# Patient Record
Sex: FEMALE | Race: ASIAN | NOT HISPANIC OR LATINO
[De-identification: names, ages, dates, MRNs, and addresses within clinical notes are randomized per-mention and may not be internally consistent; named-entity substitution may affect disease eponyms.]

---

## 2017-01-31 ENCOUNTER — RX RENEWAL (OUTPATIENT)
Age: 82
End: 2017-01-31

## 2017-02-03 ENCOUNTER — APPOINTMENT (OUTPATIENT)
Dept: FAMILY MEDICINE | Facility: CLINIC | Age: 82
End: 2017-02-03

## 2017-02-03 VITALS — SYSTOLIC BLOOD PRESSURE: 148 MMHG | RESPIRATION RATE: 14 BRPM | HEART RATE: 12 BPM | DIASTOLIC BLOOD PRESSURE: 60 MMHG

## 2017-03-17 ENCOUNTER — MEDICATION RENEWAL (OUTPATIENT)
Age: 82
End: 2017-03-17

## 2017-04-28 ENCOUNTER — APPOINTMENT (OUTPATIENT)
Dept: FAMILY MEDICINE | Facility: CLINIC | Age: 82
End: 2017-04-28

## 2017-04-28 VITALS — HEART RATE: 72 BPM | DIASTOLIC BLOOD PRESSURE: 60 MMHG | SYSTOLIC BLOOD PRESSURE: 120 MMHG | RESPIRATION RATE: 14 BRPM

## 2017-05-17 ENCOUNTER — FORM ENCOUNTER (OUTPATIENT)
Age: 82
End: 2017-05-17

## 2017-05-18 ENCOUNTER — APPOINTMENT (OUTPATIENT)
Dept: RADIOLOGY | Facility: CLINIC | Age: 82
End: 2017-05-18

## 2017-05-18 ENCOUNTER — OUTPATIENT (OUTPATIENT)
Dept: OUTPATIENT SERVICES | Facility: HOSPITAL | Age: 82
LOS: 1 days | End: 2017-05-18
Payer: MEDICARE

## 2017-05-18 DIAGNOSIS — Z98.89 OTHER SPECIFIED POSTPROCEDURAL STATES: Chronic | ICD-10-CM

## 2017-05-18 DIAGNOSIS — R05 COUGH: ICD-10-CM

## 2017-05-18 PROCEDURE — 71045 X-RAY EXAM CHEST 1 VIEW: CPT

## 2017-06-12 ENCOUNTER — APPOINTMENT (OUTPATIENT)
Dept: FAMILY MEDICINE | Facility: CLINIC | Age: 82
End: 2017-06-12

## 2017-06-12 VITALS
BODY MASS INDEX: 22.08 KG/M2 | OXYGEN SATURATION: 98 % | RESPIRATION RATE: 15 BRPM | HEIGHT: 62 IN | WEIGHT: 120 LBS | SYSTOLIC BLOOD PRESSURE: 120 MMHG | DIASTOLIC BLOOD PRESSURE: 76 MMHG | TEMPERATURE: 98.4 F | HEART RATE: 75 BPM

## 2017-07-18 ENCOUNTER — RX RENEWAL (OUTPATIENT)
Age: 82
End: 2017-07-18

## 2017-09-08 ENCOUNTER — APPOINTMENT (OUTPATIENT)
Dept: FAMILY MEDICINE | Facility: CLINIC | Age: 82
End: 2017-09-08
Payer: MEDICARE

## 2017-09-08 VITALS
HEIGHT: 62 IN | RESPIRATION RATE: 15 BRPM | BODY MASS INDEX: 22.08 KG/M2 | WEIGHT: 120 LBS | DIASTOLIC BLOOD PRESSURE: 78 MMHG | SYSTOLIC BLOOD PRESSURE: 120 MMHG | OXYGEN SATURATION: 98 % | TEMPERATURE: 98.4 F | HEART RATE: 74 BPM

## 2017-09-08 PROCEDURE — 99214 OFFICE O/P EST MOD 30 MIN: CPT

## 2017-09-08 RX ORDER — FLUTICASONE PROPIONATE 50 UG/1
50 SPRAY, METERED NASAL
Qty: 1 | Refills: 2 | Status: DISCONTINUED | COMMUNITY
Start: 2017-06-12 | End: 2017-09-08

## 2017-09-08 RX ORDER — ATORVASTATIN CALCIUM 10 MG/1
10 TABLET, FILM COATED ORAL
Qty: 90 | Refills: 3 | Status: DISCONTINUED | COMMUNITY
Start: 2017-03-03 | End: 2017-09-08

## 2017-09-12 ENCOUNTER — RX RENEWAL (OUTPATIENT)
Age: 82
End: 2017-09-12

## 2017-09-18 ENCOUNTER — RX RENEWAL (OUTPATIENT)
Age: 82
End: 2017-09-18

## 2017-09-22 ENCOUNTER — RX RENEWAL (OUTPATIENT)
Age: 82
End: 2017-09-22

## 2017-10-09 ENCOUNTER — RX RENEWAL (OUTPATIENT)
Age: 82
End: 2017-10-09

## 2017-10-11 ENCOUNTER — RX RENEWAL (OUTPATIENT)
Age: 82
End: 2017-10-11

## 2017-10-17 ENCOUNTER — RX RENEWAL (OUTPATIENT)
Age: 82
End: 2017-10-17

## 2017-11-03 ENCOUNTER — OTHER (OUTPATIENT)
Age: 82
End: 2017-11-03

## 2017-11-13 ENCOUNTER — OTHER (OUTPATIENT)
Age: 82
End: 2017-11-13

## 2017-12-11 ENCOUNTER — RX RENEWAL (OUTPATIENT)
Age: 82
End: 2017-12-11

## 2017-12-14 ENCOUNTER — APPOINTMENT (OUTPATIENT)
Dept: FAMILY MEDICINE | Facility: CLINIC | Age: 82
End: 2017-12-14
Payer: MEDICARE

## 2017-12-14 VITALS
OXYGEN SATURATION: 72 % | BODY MASS INDEX: 22.08 KG/M2 | DIASTOLIC BLOOD PRESSURE: 80 MMHG | HEART RATE: 64 BPM | HEIGHT: 62 IN | SYSTOLIC BLOOD PRESSURE: 120 MMHG | WEIGHT: 120 LBS

## 2017-12-14 DIAGNOSIS — Z23 ENCOUNTER FOR IMMUNIZATION: ICD-10-CM

## 2017-12-14 PROCEDURE — 99215 OFFICE O/P EST HI 40 MIN: CPT | Mod: 25

## 2017-12-14 PROCEDURE — 90688 IIV4 VACCINE SPLT 0.5 ML IM: CPT

## 2017-12-14 PROCEDURE — G0009: CPT

## 2017-12-14 PROCEDURE — 90732 PPSV23 VACC 2 YRS+ SUBQ/IM: CPT

## 2017-12-14 PROCEDURE — G0008: CPT

## 2017-12-21 ENCOUNTER — RX RENEWAL (OUTPATIENT)
Age: 82
End: 2017-12-21

## 2018-01-24 ENCOUNTER — OTHER (OUTPATIENT)
Age: 83
End: 2018-01-24

## 2018-05-02 ENCOUNTER — RX RENEWAL (OUTPATIENT)
Age: 83
End: 2018-05-02

## 2018-05-31 ENCOUNTER — APPOINTMENT (OUTPATIENT)
Dept: FAMILY MEDICINE | Facility: CLINIC | Age: 83
End: 2018-05-31
Payer: MEDICARE

## 2018-05-31 VITALS
OXYGEN SATURATION: 93 % | SYSTOLIC BLOOD PRESSURE: 126 MMHG | DIASTOLIC BLOOD PRESSURE: 80 MMHG | HEART RATE: 91 BPM | TEMPERATURE: 98.7 F

## 2018-05-31 PROCEDURE — 99215 OFFICE O/P EST HI 40 MIN: CPT | Mod: 25

## 2018-05-31 PROCEDURE — G0444 DEPRESSION SCREEN ANNUAL: CPT | Mod: 59

## 2018-05-31 RX ORDER — CIPROFLOXACIN HYDROCHLORIDE 250 MG/1
250 TABLET, FILM COATED ORAL
Qty: 6 | Refills: 0 | Status: DISCONTINUED | COMMUNITY
Start: 2017-12-21 | End: 2018-05-31

## 2018-05-31 RX ORDER — BACITRACIN ZINC, NEOMYCIN SULFATE, AND POLYMYXIN B SULFATE 400; 3.5; 5 [IU]/G; MG/G; [IU]/G
3.5-400-5 OINTMENT TOPICAL TWICE DAILY
Qty: 1 | Refills: 1 | Status: DISCONTINUED | COMMUNITY
Start: 2017-12-14 | End: 2018-05-31

## 2018-05-31 NOTE — COUNSELING
[Healthy eating counseling provided] : healthy eating [Activity counseling provided] : activity [Disease Management counseling provided] : disease management  [Behavioral health counseling provided] : behavioral health  [Keep Food Diary] : Keep food diary [Take medications as directed] : Take medications as directed [Check sugar ___ times per week] : Check blood sugar [unfilled]  times per week [Keep BP log to bring to next visit] : Keep BP log to bring to next visit [Engage in a relaxing activity] : Engage in a relaxing activity [ - Annual Depression Screening] : Annual Depression Screening

## 2018-06-01 ENCOUNTER — RX RENEWAL (OUTPATIENT)
Age: 83
End: 2018-06-01

## 2018-06-27 ENCOUNTER — APPOINTMENT (OUTPATIENT)
Dept: FAMILY MEDICINE | Facility: CLINIC | Age: 83
End: 2018-06-27
Payer: MEDICARE

## 2018-06-27 VITALS
DIASTOLIC BLOOD PRESSURE: 60 MMHG | TEMPERATURE: 98.4 F | OXYGEN SATURATION: 98 % | SYSTOLIC BLOOD PRESSURE: 126 MMHG | HEART RATE: 67 BPM

## 2018-06-27 PROCEDURE — 99215 OFFICE O/P EST HI 40 MIN: CPT

## 2018-06-27 RX ORDER — ASPIRIN 81 MG
81 TABLET, DELAYED RELEASE (ENTERIC COATED) ORAL DAILY
Qty: 90 | Refills: 1 | Status: DISCONTINUED | COMMUNITY
Start: 2018-05-31 | End: 2018-06-27

## 2018-06-27 RX ORDER — ASPIRIN 81 MG
81 TABLET, DELAYED RELEASE (ENTERIC COATED) ORAL
Qty: 30 | Refills: 5 | Status: DISCONTINUED | COMMUNITY
Start: 2017-12-14 | End: 2018-06-27

## 2018-06-27 RX ORDER — BUDESONIDE AND FORMOTEROL FUMARATE DIHYDRATE 80; 4.5 UG/1; UG/1
80-4.5 AEROSOL RESPIRATORY (INHALATION) TWICE DAILY
Qty: 1 | Refills: 3 | Status: DISCONTINUED | COMMUNITY
Start: 2017-09-18 | End: 2018-06-27

## 2018-06-27 NOTE — HISTORY OF PRESENT ILLNESS
[Diabetes Mellitus] : Diabetes Mellitus [Coronary Artery Disease] : Coronary Artery Disease [Hypertension] : Hypertension [Patient was last seen on ___] : Patient was last seen on [unfilled] [Checks BP Regularly] : The patient checks ~his/her~ blood pressure regularly [<130/90] : Target blood pressure is  <130/90 [Near target goal] : BP near target goal [Stable] : Patient is stable [Arnavifestyle management] : lifestyle management [Moderate Intensity Therapy] : Patient is currently on moderate intensity statin  therapy [Stable] : Overall status has been stable [FreeTextEntry6] : here for f/u . c/o PND for few weeks, occasional cough with clear phlegm . here with Aide Ladan. patient is wheel chair bound. she needs refills of medicines. she was diagnosed with right leg clot on angiogram done in 4/2018  which was treated with vascular surgeon.  [No episodes] : No hypoglycemic episodes since the last visit. [Check glucose ___ x/day] : Patient checks  blood glucose [unfilled]  times a day [Fasting:  ___] : Fasting Blood Sugar: [unfilled] mg/dL [] : always in range [Regular podiatrist visits] : Patient had regular podiatrist visits [Understanding of foot care] : Patient expressed understanding of foot care [Retinopathy] : No retinopathy [Most Recent A1C: ___] : Most recent A1C was [unfilled] [Target A1C:  ___] : Target A1C is [unfilled] [Target goal met] : A1C target goal met [Moderate Intensity] : Patient is currently on moderate intensity statin  therapy [EyeExamDate] : 2016 [de-identified] : 129-138/80 [Symptoms Limit Activities] : Symptoms do not limit ~his/her~ activities

## 2018-06-27 NOTE — PLAN
[FreeTextEntry1] : diabetic low chol. diet. \par physical therapy. \par  meds refilled.\par  eye exam a year ago. \par advance directive daughter Loly Cameron\par  get lab report \par \par preventive tests and vaccines declined. \par

## 2018-06-27 NOTE — HEALTH RISK ASSESSMENT
[No falls in past year] : Patient reported no falls in the past year [0] : 2) Feeling down, depressed, or hopeless: Not at all (0) [] : No [[id number]] : 0

## 2018-06-27 NOTE — PHYSICAL EXAM
[No Acute Distress] : no acute distress [Well Nourished] : well nourished [Well Developed] : well developed [Well-Appearing] : well-appearing [Normal Sclera/Conjunctiva] : normal sclera/conjunctiva [PERRL] : pupils equal round and reactive to light [EOMI] : extraocular movements intact [No JVD] : no jugular venous distention [Supple] : supple [No Lymphadenopathy] : no lymphadenopathy [Thyroid Normal, No Nodules] : the thyroid was normal and there were no nodules present [No Respiratory Distress] : no respiratory distress  [Clear to Auscultation] : lungs were clear to auscultation bilaterally [No Accessory Muscle Use] : no accessory muscle use [Normal Rate] : normal rate  [Regular Rhythm] : with a regular rhythm [Normal S1, S2] : normal S1 and S2 [No Murmur] : no murmur heard [No Carotid Bruits] : no carotid bruits [No Abdominal Bruit] : a ~M bruit was not heard ~T in the abdomen [No Varicosities] : no varicosities [Pedal Pulses Present] : the pedal pulses are present [No Edema] : there was no peripheral edema [No Extremity Clubbing/Cyanosis] : no extremity clubbing/cyanosis [No Palpable Aorta] : no palpable aorta [Soft] : abdomen soft [Non Tender] : non-tender [Non-distended] : non-distended [No Masses] : no abdominal mass palpated [No HSM] : no HSM [Normal Bowel Sounds] : normal bowel sounds [Normal Posterior Cervical Nodes] : no posterior cervical lymphadenopathy [Normal Anterior Cervical Nodes] : no anterior cervical lymphadenopathy [No CVA Tenderness] : no CVA  tenderness [No Spinal Tenderness] : no spinal tenderness [No Rash] : no rash [Deep Tendon Reflexes (DTR)] : deep tendon reflexes were 2+ and symmetric [Normal Affect] : the affect was normal [Normal Insight/Judgement] : insight and judgment were intact [Comprehensive Foot Exam Normal] : Right and left foot were examined and both feet are normal. No ulcers in either foot. Toes are normal and with full ROM.  Normal tactile sensation with monofilament testing throughout both feet [de-identified] : weakness generalized, pt. is wheelchair bound [de-identified] : weakness on right side

## 2018-07-18 ENCOUNTER — RX RENEWAL (OUTPATIENT)
Age: 83
End: 2018-07-18

## 2018-07-24 ENCOUNTER — OTHER (OUTPATIENT)
Age: 83
End: 2018-07-24

## 2018-08-27 ENCOUNTER — APPOINTMENT (OUTPATIENT)
Dept: FAMILY MEDICINE | Facility: CLINIC | Age: 83
End: 2018-08-27

## 2018-09-10 ENCOUNTER — RX RENEWAL (OUTPATIENT)
Age: 83
End: 2018-09-10

## 2018-09-18 ENCOUNTER — RX RENEWAL (OUTPATIENT)
Age: 83
End: 2018-09-18

## 2018-09-19 ENCOUNTER — RX RENEWAL (OUTPATIENT)
Age: 83
End: 2018-09-19

## 2018-09-19 DIAGNOSIS — Z00.00 ENCOUNTER FOR GENERAL ADULT MEDICAL EXAMINATION W/OUT ABNORMAL FINDINGS: ICD-10-CM

## 2018-09-24 ENCOUNTER — RX RENEWAL (OUTPATIENT)
Age: 83
End: 2018-09-24

## 2018-10-02 NOTE — PLAN
[FreeTextEntry1] : advance directive daughter Loly Cameron\par preventive tests and vaccines declined. \par home care forms filled out.

## 2018-10-02 NOTE — HISTORY OF PRESENT ILLNESS
[FreeTextEntry1] : here for f/u dm-2.  [de-identified] : Here for f/u diabetes. She needs home care certification.  denies chest pain , sob , fever, chills, cough.

## 2018-10-02 NOTE — HEALTH RISK ASSESSMENT
[No falls in past year] : Patient reported no falls in the past year [0] : 2) Feeling down, depressed, or hopeless: Not at all (0) [] : No [[id number]] : 0

## 2018-10-02 NOTE — PHYSICAL EXAM
[No Acute Distress] : no acute distress [Well Nourished] : well nourished [Well Developed] : well developed [Well-Appearing] : well-appearing [Normal Sclera/Conjunctiva] : normal sclera/conjunctiva [PERRL] : pupils equal round and reactive to light [EOMI] : extraocular movements intact [No JVD] : no jugular venous distention [Supple] : supple [No Lymphadenopathy] : no lymphadenopathy [Thyroid Normal, No Nodules] : the thyroid was normal and there were no nodules present [No Respiratory Distress] : no respiratory distress  [Clear to Auscultation] : lungs were clear to auscultation bilaterally [No Accessory Muscle Use] : no accessory muscle use [Normal Rate] : normal rate  [Regular Rhythm] : with a regular rhythm [Normal S1, S2] : normal S1 and S2 [No Murmur] : no murmur heard [No Carotid Bruits] : no carotid bruits [No Abdominal Bruit] : a ~M bruit was not heard ~T in the abdomen [No Varicosities] : no varicosities [Pedal Pulses Present] : the pedal pulses are present [No Edema] : there was no peripheral edema [No Extremity Clubbing/Cyanosis] : no extremity clubbing/cyanosis [No Palpable Aorta] : no palpable aorta [Soft] : abdomen soft [Non Tender] : non-tender [Non-distended] : non-distended [No Masses] : no abdominal mass palpated [No HSM] : no HSM [Normal Bowel Sounds] : normal bowel sounds [Normal Posterior Cervical Nodes] : no posterior cervical lymphadenopathy [Normal Anterior Cervical Nodes] : no anterior cervical lymphadenopathy [No CVA Tenderness] : no CVA  tenderness [No Spinal Tenderness] : no spinal tenderness [No Rash] : no rash [Deep Tendon Reflexes (DTR)] : deep tendon reflexes were 2+ and symmetric [Normal Affect] : the affect was normal [Normal Insight/Judgement] : insight and judgment were intact [Comprehensive Foot Exam Normal] : Right and left foot were examined and both feet are normal. No ulcers in either foot. Toes are normal and with full ROM.  Normal tactile sensation with monofilament testing throughout both feet [de-identified] : weakness generalized, pt. is wheelchair bound [de-identified] : weakness on right side

## 2018-10-09 ENCOUNTER — RX RENEWAL (OUTPATIENT)
Age: 83
End: 2018-10-09

## 2018-10-09 ENCOUNTER — APPOINTMENT (OUTPATIENT)
Dept: FAMILY MEDICINE | Facility: CLINIC | Age: 83
End: 2018-10-09
Payer: MEDICARE

## 2018-10-09 VITALS
DIASTOLIC BLOOD PRESSURE: 70 MMHG | SYSTOLIC BLOOD PRESSURE: 112 MMHG | HEART RATE: 62 BPM | TEMPERATURE: 98.8 F | OXYGEN SATURATION: 98 %

## 2018-10-09 PROCEDURE — G0009: CPT

## 2018-10-09 PROCEDURE — 90656 IIV3 VACC NO PRSV 0.5 ML IM: CPT

## 2018-10-09 PROCEDURE — 99214 OFFICE O/P EST MOD 30 MIN: CPT | Mod: 25

## 2018-10-09 PROCEDURE — G0008: CPT

## 2018-10-09 PROCEDURE — 90732 PPSV23 VACC 2 YRS+ SUBQ/IM: CPT

## 2018-10-09 NOTE — HEALTH RISK ASSESSMENT
[No falls in past year] : Patient reported no falls in the past year [0] : 2) Feeling down, depressed, or hopeless: Not at all (0) [] : No [de-identified] : cardiology 2 week ago [[id number]] : 0

## 2018-10-09 NOTE — COUNSELING
[Activity counseling provided] : activity [Behavioral health counseling provided] : behavioral health  [Walking] : Walking [Engage in a relaxing activity] : Engage in a relaxing activity [None] : None [Good understanding] : Patient has a good understanding of lifestyle changes and the steps needed to achieve self management goals

## 2018-10-09 NOTE — PLAN
[FreeTextEntry1] :  continue diabetic diet, current meds. \par  flu and pneumonia vaccines administered.

## 2018-10-09 NOTE — HISTORY OF PRESENT ILLNESS
[FreeTextEntry1] : here for f/u dm-2.  [de-identified] : Here for f/u diabetes mellitus 2, denies chest pain , sob , fever, chills, cough. He blood sugar is under control. needs vaccines. no complaints.

## 2018-10-09 NOTE — PHYSICAL EXAM
[No Acute Distress] : no acute distress [Well Nourished] : well nourished [Well Developed] : well developed [Well-Appearing] : well-appearing [Normal Sclera/Conjunctiva] : normal sclera/conjunctiva [PERRL] : pupils equal round and reactive to light [EOMI] : extraocular movements intact [No JVD] : no jugular venous distention [Supple] : supple [No Lymphadenopathy] : no lymphadenopathy [Thyroid Normal, No Nodules] : the thyroid was normal and there were no nodules present [No Respiratory Distress] : no respiratory distress  [Clear to Auscultation] : lungs were clear to auscultation bilaterally [No Accessory Muscle Use] : no accessory muscle use [Normal Rate] : normal rate  [Regular Rhythm] : with a regular rhythm [Normal S1, S2] : normal S1 and S2 [No Murmur] : no murmur heard [No Carotid Bruits] : no carotid bruits [No Abdominal Bruit] : a ~M bruit was not heard ~T in the abdomen [No Varicosities] : no varicosities [Pedal Pulses Present] : the pedal pulses are present [No Edema] : there was no peripheral edema [No Extremity Clubbing/Cyanosis] : no extremity clubbing/cyanosis [No Palpable Aorta] : no palpable aorta [Soft] : abdomen soft [Non Tender] : non-tender [Non-distended] : non-distended [No Masses] : no abdominal mass palpated [No HSM] : no HSM [Normal Bowel Sounds] : normal bowel sounds [Normal Posterior Cervical Nodes] : no posterior cervical lymphadenopathy [Normal Anterior Cervical Nodes] : no anterior cervical lymphadenopathy [No CVA Tenderness] : no CVA  tenderness [No Spinal Tenderness] : no spinal tenderness [No Rash] : no rash [Deep Tendon Reflexes (DTR)] : deep tendon reflexes were 2+ and symmetric [Normal Affect] : the affect was normal [Normal Insight/Judgement] : insight and judgment were intact [Comprehensive Foot Exam Normal] : Right and left foot were examined and both feet are normal. No ulcers in either foot. Toes are normal and with full ROM.  Normal tactile sensation with monofilament testing throughout both feet [de-identified] : weakness generalized, pt. is wheelchair bound [de-identified] : weakness on right side

## 2018-10-09 NOTE — REVIEW OF SYSTEMS
[Unsteady Walking] : ataxia [Negative] : Heme/Lymph [Headache] : no headache [Dizziness] : no dizziness [Fainting] : no fainting [Confusion] : no confusion [Memory Loss] : no memory loss

## 2018-11-14 ENCOUNTER — OTHER (OUTPATIENT)
Age: 83
End: 2018-11-14

## 2018-11-16 ENCOUNTER — MEDICATION RENEWAL (OUTPATIENT)
Age: 83
End: 2018-11-16

## 2018-11-19 ENCOUNTER — RX RENEWAL (OUTPATIENT)
Age: 83
End: 2018-11-19

## 2018-11-28 ENCOUNTER — MEDICATION RENEWAL (OUTPATIENT)
Age: 83
End: 2018-11-28

## 2018-12-12 ENCOUNTER — APPOINTMENT (OUTPATIENT)
Dept: FAMILY MEDICINE | Facility: CLINIC | Age: 83
End: 2018-12-12
Payer: MEDICARE

## 2018-12-12 VITALS
OXYGEN SATURATION: 96 % | SYSTOLIC BLOOD PRESSURE: 116 MMHG | DIASTOLIC BLOOD PRESSURE: 58 MMHG | HEART RATE: 73 BPM | TEMPERATURE: 98.5 F

## 2018-12-12 PROCEDURE — 99215 OFFICE O/P EST HI 40 MIN: CPT

## 2018-12-12 RX ORDER — ATORVASTATIN CALCIUM 10 MG/1
10 TABLET, FILM COATED ORAL
Qty: 90 | Refills: 3 | Status: DISCONTINUED | COMMUNITY
Start: 2017-07-14 | End: 2018-12-12

## 2018-12-12 NOTE — PHYSICAL EXAM
[No Acute Distress] : no acute distress [Well Nourished] : well nourished [Well Developed] : well developed [Well-Appearing] : well-appearing [Normal Sclera/Conjunctiva] : normal sclera/conjunctiva [PERRL] : pupils equal round and reactive to light [EOMI] : extraocular movements intact [No JVD] : no jugular venous distention [Supple] : supple [No Lymphadenopathy] : no lymphadenopathy [Thyroid Normal, No Nodules] : the thyroid was normal and there were no nodules present [No Respiratory Distress] : no respiratory distress  [Clear to Auscultation] : lungs were clear to auscultation bilaterally [No Accessory Muscle Use] : no accessory muscle use [Normal Rate] : normal rate  [Regular Rhythm] : with a regular rhythm [Normal S1, S2] : normal S1 and S2 [No Murmur] : no murmur heard [No Carotid Bruits] : no carotid bruits [No Abdominal Bruit] : a ~M bruit was not heard ~T in the abdomen [No Varicosities] : no varicosities [Pedal Pulses Present] : the pedal pulses are present [No Edema] : there was no peripheral edema [No Extremity Clubbing/Cyanosis] : no extremity clubbing/cyanosis [No Palpable Aorta] : no palpable aorta [Soft] : abdomen soft [Non Tender] : non-tender [Non-distended] : non-distended [No Masses] : no abdominal mass palpated [No HSM] : no HSM [Normal Bowel Sounds] : normal bowel sounds [Normal Posterior Cervical Nodes] : no posterior cervical lymphadenopathy [Normal Anterior Cervical Nodes] : no anterior cervical lymphadenopathy [No CVA Tenderness] : no CVA  tenderness [No Spinal Tenderness] : no spinal tenderness [No Rash] : no rash [Deep Tendon Reflexes (DTR)] : deep tendon reflexes were 2+ and symmetric [Normal Affect] : the affect was normal [Normal Insight/Judgement] : insight and judgment were intact [Comprehensive Foot Exam Normal] : Right and left foot were examined and both feet are normal. No ulcers in either foot. Toes are normal and with full ROM.  Normal tactile sensation with monofilament testing throughout both feet [de-identified] : weakness generalized, pt. is wheelchair bound [de-identified] : weakness on right side

## 2018-12-12 NOTE — PLAN
[FreeTextEntry1] :  continue diabetic diet, current meds. \par continue PT. DEFER SHINGLES VACCINE. \par  elder care forms filled out.

## 2018-12-12 NOTE — HISTORY OF PRESENT ILLNESS
[No episodes] : No hypoglycemic episodes since the last visit. [Review glucose log over ___ months] : Glucose logs reviewed over the past [unfilled] months reveal: [Fasting:  ___] : Fasting Blood Sugar: [unfilled] mg/dL [Regular podiatrist visits] : Patient had regular podiatrist visits [Understanding of foot care] : Patient expressed understanding of foot care [Most Recent A1C: ___] : Most recent A1C was [unfilled] [Target goal met] : A1C target goal met [No therapy] : Patient is not on statin therapy [FreeTextEntry1] : here for f/u dm-2.  [Retinopathy] : No retinopathy [EyeExamDate] : MM/2018 [de-identified] : Here for f/u diabetes mellitus 2, denies chest pain , sob , fever, chills, cough. He blood sugar is under control. needs vaccines. no complaints. Here with Ladan Cordon , home health aide. Also needs forms filled out for Elder care.

## 2018-12-12 NOTE — HEALTH RISK ASSESSMENT
[No falls in past year] : Patient reported no falls in the past year [0] : 2) Feeling down, depressed, or hopeless: Not at all (0) [Patient declined mammogram] : Patient declined mammogram [Patient declined PAP Smear] : Patient declined PAP Smear [Patient declined bone density test] : Patient declined bone density test [Patient declined colonoscopy] : Patient declined colonoscopy [HIV test declined] : HIV test declined [Hepatitis C test declined] : Hepatitis C test declined [None] : None [With Family] : lives with family [Retired] : retired [] :  [Feels Safe at Home] : Feels safe at home [Independent] : feeding [Some assistance needed] : using telephone [Full assistance needed] : managing finances [Reports changes in hearing] : Reports changes in hearing [Change in mental status noted] : No change in mental status noted [Language] : denies difficulty with language [Sexually Active] : not sexually active [Reports changes in vision] : Reports no changes in vision [de-identified] : with daughter Sonia [de-identified] : upper denture [] : No [de-identified] : cardiology 2 week ago [[id number]] : 0

## 2018-12-18 ENCOUNTER — RX RENEWAL (OUTPATIENT)
Age: 83
End: 2018-12-18

## 2018-12-20 ENCOUNTER — MEDICATION RENEWAL (OUTPATIENT)
Age: 83
End: 2018-12-20

## 2018-12-28 ENCOUNTER — OTHER (OUTPATIENT)
Age: 83
End: 2018-12-28

## 2019-01-11 ENCOUNTER — RX RENEWAL (OUTPATIENT)
Age: 84
End: 2019-01-11

## 2019-01-23 ENCOUNTER — RX RENEWAL (OUTPATIENT)
Age: 84
End: 2019-01-23

## 2019-01-25 ENCOUNTER — RX RENEWAL (OUTPATIENT)
Age: 84
End: 2019-01-25

## 2019-01-29 ENCOUNTER — RX RENEWAL (OUTPATIENT)
Age: 84
End: 2019-01-29

## 2019-02-08 ENCOUNTER — OTHER (OUTPATIENT)
Age: 84
End: 2019-02-08

## 2019-03-13 ENCOUNTER — INPATIENT (INPATIENT)
Facility: HOSPITAL | Age: 84
LOS: 4 days | Discharge: NURSING, REHAB OR LONG-TERM CARE | DRG: 300 | End: 2019-03-18
Attending: SURGERY | Admitting: SURGERY
Payer: MEDICARE

## 2019-03-13 ENCOUNTER — APPOINTMENT (OUTPATIENT)
Dept: FAMILY MEDICINE | Facility: CLINIC | Age: 84
End: 2019-03-13
Payer: MEDICARE

## 2019-03-13 VITALS
SYSTOLIC BLOOD PRESSURE: 92 MMHG | OXYGEN SATURATION: 96 % | DIASTOLIC BLOOD PRESSURE: 54 MMHG | TEMPERATURE: 98.4 F | HEART RATE: 69 BPM

## 2019-03-13 VITALS
SYSTOLIC BLOOD PRESSURE: 160 MMHG | RESPIRATION RATE: 17 BRPM | DIASTOLIC BLOOD PRESSURE: 79 MMHG | WEIGHT: 121.7 LBS | OXYGEN SATURATION: 99 % | HEART RATE: 64 BPM | TEMPERATURE: 98 F

## 2019-03-13 DIAGNOSIS — I73.9 PERIPHERAL VASCULAR DISEASE, UNSPECIFIED: ICD-10-CM

## 2019-03-13 DIAGNOSIS — Z98.89 OTHER SPECIFIED POSTPROCEDURAL STATES: Chronic | ICD-10-CM

## 2019-03-13 LAB
ALBUMIN SERPL ELPH-MCNC: 3.7 G/DL — SIGNIFICANT CHANGE UP (ref 3.3–5)
ALP SERPL-CCNC: 71 U/L — SIGNIFICANT CHANGE UP (ref 40–120)
ALT FLD-CCNC: 16 U/L — SIGNIFICANT CHANGE UP (ref 10–45)
ANION GAP SERPL CALC-SCNC: 14 MMOL/L — SIGNIFICANT CHANGE UP (ref 5–17)
APTT BLD: 32.2 SEC — SIGNIFICANT CHANGE UP (ref 27.5–36.3)
AST SERPL-CCNC: 21 U/L — SIGNIFICANT CHANGE UP (ref 10–40)
BASOPHILS # BLD AUTO: 0.1 K/UL — SIGNIFICANT CHANGE UP (ref 0–0.2)
BASOPHILS NFR BLD AUTO: 0.7 % — SIGNIFICANT CHANGE UP (ref 0–2)
BILIRUB SERPL-MCNC: 0.3 MG/DL — SIGNIFICANT CHANGE UP (ref 0.2–1.2)
BUN SERPL-MCNC: 15 MG/DL — SIGNIFICANT CHANGE UP (ref 7–23)
CALCIUM SERPL-MCNC: 9.1 MG/DL — SIGNIFICANT CHANGE UP (ref 8.4–10.5)
CHLORIDE SERPL-SCNC: 103 MMOL/L — SIGNIFICANT CHANGE UP (ref 96–108)
CO2 SERPL-SCNC: 22 MMOL/L — SIGNIFICANT CHANGE UP (ref 22–31)
CREAT SERPL-MCNC: 0.88 MG/DL — SIGNIFICANT CHANGE UP (ref 0.5–1.3)
EOSINOPHIL # BLD AUTO: 0.4 K/UL — SIGNIFICANT CHANGE UP (ref 0–0.5)
EOSINOPHIL NFR BLD AUTO: 4.5 % — SIGNIFICANT CHANGE UP (ref 0–6)
GAS PNL BLDV: SIGNIFICANT CHANGE UP
GLUCOSE SERPL-MCNC: 144 MG/DL — HIGH (ref 70–99)
HCT VFR BLD CALC: 37.6 % — SIGNIFICANT CHANGE UP (ref 34.5–45)
HGB BLD-MCNC: 12.6 G/DL — SIGNIFICANT CHANGE UP (ref 11.5–15.5)
INR BLD: 0.98 RATIO — SIGNIFICANT CHANGE UP (ref 0.88–1.16)
LYMPHOCYTES # BLD AUTO: 2.6 K/UL — SIGNIFICANT CHANGE UP (ref 1–3.3)
LYMPHOCYTES # BLD AUTO: 30.2 % — SIGNIFICANT CHANGE UP (ref 13–44)
MCHC RBC-ENTMCNC: 30.1 PG — SIGNIFICANT CHANGE UP (ref 27–34)
MCHC RBC-ENTMCNC: 33.5 GM/DL — SIGNIFICANT CHANGE UP (ref 32–36)
MCV RBC AUTO: 89.9 FL — SIGNIFICANT CHANGE UP (ref 80–100)
MONOCYTES # BLD AUTO: 0.8 K/UL — SIGNIFICANT CHANGE UP (ref 0–0.9)
MONOCYTES NFR BLD AUTO: 8.9 % — SIGNIFICANT CHANGE UP (ref 2–14)
NEUTROPHILS # BLD AUTO: 4.9 K/UL — SIGNIFICANT CHANGE UP (ref 1.8–7.4)
NEUTROPHILS NFR BLD AUTO: 55.8 % — SIGNIFICANT CHANGE UP (ref 43–77)
PLATELET # BLD AUTO: 244 K/UL — SIGNIFICANT CHANGE UP (ref 150–400)
POTASSIUM SERPL-MCNC: 4.2 MMOL/L — SIGNIFICANT CHANGE UP (ref 3.5–5.3)
POTASSIUM SERPL-SCNC: 4.2 MMOL/L — SIGNIFICANT CHANGE UP (ref 3.5–5.3)
PROT SERPL-MCNC: 6.9 G/DL — SIGNIFICANT CHANGE UP (ref 6–8.3)
PROTHROM AB SERPL-ACNC: 11.2 SEC — SIGNIFICANT CHANGE UP (ref 10–12.9)
RBC # BLD: 4.18 M/UL — SIGNIFICANT CHANGE UP (ref 3.8–5.2)
RBC # FLD: 11.8 % — SIGNIFICANT CHANGE UP (ref 10.3–14.5)
SODIUM SERPL-SCNC: 139 MMOL/L — SIGNIFICANT CHANGE UP (ref 135–145)
WBC # BLD: 8.8 K/UL — SIGNIFICANT CHANGE UP (ref 3.8–10.5)
WBC # FLD AUTO: 8.8 K/UL — SIGNIFICANT CHANGE UP (ref 3.8–10.5)

## 2019-03-13 PROCEDURE — 99215 OFFICE O/P EST HI 40 MIN: CPT | Mod: PD

## 2019-03-13 PROCEDURE — 99285 EMERGENCY DEPT VISIT HI MDM: CPT | Mod: GC

## 2019-03-13 PROCEDURE — 75635 CT ANGIO ABDOMINAL ARTERIES: CPT | Mod: 26

## 2019-03-13 RX ORDER — HEPARIN SODIUM 5000 [USP'U]/ML
4000 INJECTION INTRAVENOUS; SUBCUTANEOUS ONCE
Qty: 0 | Refills: 0 | Status: DISCONTINUED | OUTPATIENT
Start: 2019-03-13 | End: 2019-03-13

## 2019-03-13 RX ORDER — PANTOPRAZOLE SODIUM 20 MG/1
40 TABLET, DELAYED RELEASE ORAL DAILY
Qty: 0 | Refills: 0 | Status: DISCONTINUED | OUTPATIENT
Start: 2019-03-13 | End: 2019-03-17

## 2019-03-13 RX ORDER — HEPARIN SODIUM 5000 [USP'U]/ML
2000 INJECTION INTRAVENOUS; SUBCUTANEOUS EVERY 6 HOURS
Qty: 0 | Refills: 0 | Status: DISCONTINUED | OUTPATIENT
Start: 2019-03-13 | End: 2019-03-15

## 2019-03-13 RX ORDER — GLUCAGON INJECTION, SOLUTION 0.5 MG/.1ML
1 INJECTION, SOLUTION SUBCUTANEOUS ONCE
Qty: 0 | Refills: 0 | Status: DISCONTINUED | OUTPATIENT
Start: 2019-03-13 | End: 2019-03-18

## 2019-03-13 RX ORDER — HEPARIN SODIUM 5000 [USP'U]/ML
4000 INJECTION INTRAVENOUS; SUBCUTANEOUS EVERY 6 HOURS
Qty: 0 | Refills: 0 | Status: DISCONTINUED | OUTPATIENT
Start: 2019-03-13 | End: 2019-03-15

## 2019-03-13 RX ORDER — HEPARIN SODIUM 5000 [USP'U]/ML
2000 INJECTION INTRAVENOUS; SUBCUTANEOUS EVERY 6 HOURS
Qty: 0 | Refills: 0 | Status: DISCONTINUED | OUTPATIENT
Start: 2019-03-13 | End: 2019-03-13

## 2019-03-13 RX ORDER — CIPROFLOXACIN HYDROCHLORIDE 250 MG/1
250 TABLET, FILM COATED ORAL TWICE DAILY
Qty: 10 | Refills: 0 | Status: DISCONTINUED | COMMUNITY
Start: 2018-12-20 | End: 2019-03-13

## 2019-03-13 RX ORDER — INSULIN LISPRO 100/ML
VIAL (ML) SUBCUTANEOUS
Qty: 0 | Refills: 0 | Status: DISCONTINUED | OUTPATIENT
Start: 2019-03-13 | End: 2019-03-18

## 2019-03-13 RX ORDER — HEPARIN SODIUM 5000 [USP'U]/ML
4000 INJECTION INTRAVENOUS; SUBCUTANEOUS EVERY 6 HOURS
Qty: 0 | Refills: 0 | Status: DISCONTINUED | OUTPATIENT
Start: 2019-03-13 | End: 2019-03-13

## 2019-03-13 RX ORDER — INSULIN LISPRO 100/ML
VIAL (ML) SUBCUTANEOUS AT BEDTIME
Qty: 0 | Refills: 0 | Status: DISCONTINUED | OUTPATIENT
Start: 2019-03-13 | End: 2019-03-18

## 2019-03-13 RX ORDER — DEXTROSE 50 % IN WATER 50 %
25 SYRINGE (ML) INTRAVENOUS ONCE
Qty: 0 | Refills: 0 | Status: DISCONTINUED | OUTPATIENT
Start: 2019-03-13 | End: 2019-03-18

## 2019-03-13 RX ORDER — HEPARIN SODIUM 5000 [USP'U]/ML
4000 INJECTION INTRAVENOUS; SUBCUTANEOUS ONCE
Qty: 0 | Refills: 0 | Status: COMPLETED | OUTPATIENT
Start: 2019-03-13 | End: 2019-03-13

## 2019-03-13 RX ORDER — HEPARIN SODIUM 5000 [USP'U]/ML
INJECTION INTRAVENOUS; SUBCUTANEOUS
Qty: 25000 | Refills: 0 | Status: DISCONTINUED | OUTPATIENT
Start: 2019-03-13 | End: 2019-03-13

## 2019-03-13 RX ORDER — SODIUM CHLORIDE 9 MG/ML
1000 INJECTION, SOLUTION INTRAVENOUS
Qty: 0 | Refills: 0 | Status: DISCONTINUED | OUTPATIENT
Start: 2019-03-13 | End: 2019-03-18

## 2019-03-13 RX ORDER — DEXTROSE 50 % IN WATER 50 %
12.5 SYRINGE (ML) INTRAVENOUS ONCE
Qty: 0 | Refills: 0 | Status: DISCONTINUED | OUTPATIENT
Start: 2019-03-13 | End: 2019-03-18

## 2019-03-13 RX ORDER — SODIUM CHLORIDE 9 MG/ML
1000 INJECTION, SOLUTION INTRAVENOUS
Qty: 0 | Refills: 0 | Status: DISCONTINUED | OUTPATIENT
Start: 2019-03-13 | End: 2019-03-15

## 2019-03-13 RX ORDER — HEPARIN SODIUM 5000 [USP'U]/ML
INJECTION INTRAVENOUS; SUBCUTANEOUS
Qty: 25000 | Refills: 0 | Status: DISCONTINUED | OUTPATIENT
Start: 2019-03-13 | End: 2019-03-15

## 2019-03-13 RX ORDER — BUDESONIDE AND FORMOTEROL FUMARATE DIHYDRATE 160; 4.5 UG/1; UG/1
2 AEROSOL RESPIRATORY (INHALATION)
Qty: 0 | Refills: 0 | Status: DISCONTINUED | OUTPATIENT
Start: 2019-03-13 | End: 2019-03-18

## 2019-03-13 RX ORDER — DEXTROSE 50 % IN WATER 50 %
15 SYRINGE (ML) INTRAVENOUS ONCE
Qty: 0 | Refills: 0 | Status: DISCONTINUED | OUTPATIENT
Start: 2019-03-13 | End: 2019-03-18

## 2019-03-13 RX ADMIN — HEPARIN SODIUM 4000 UNIT(S): 5000 INJECTION INTRAVENOUS; SUBCUTANEOUS at 18:01

## 2019-03-13 RX ADMIN — SODIUM CHLORIDE 75 MILLILITER(S): 9 INJECTION, SOLUTION INTRAVENOUS at 23:03

## 2019-03-13 RX ADMIN — HEPARIN SODIUM UNIT(S)/HR: 5000 INJECTION INTRAVENOUS; SUBCUTANEOUS at 18:09

## 2019-03-13 NOTE — ED PROVIDER NOTE - PROGRESS NOTE DETAILS
Have spoken with surgery twice since 1PM, they are delayed and will come shortly; they understand is emergent consult. venita bianchi again regarding concerning ct results. they are on line w attg and will get back to me promptly. AK: Spoke to vascular surgery. Still need to talk to attending, but will review very shortly. AK: Admit to Vascular.

## 2019-03-13 NOTE — CONSULT NOTE ADULT - SUBJECTIVE AND OBJECTIVE BOX
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 Patient is primarily Tagalog speaking, daughter at bedside translating and providing history. She states that she first noticed her mother's R foot discoloration 1 week ago and noted that the foot was cool. Patient states that she began to have pain in the foot around that time. She was evaluated in her PCP's clinic this afternoon and her PCP was unable to palpable pulses in the lower extremities bilaterally and sent her in to the ED for evaluation. At baseline, patient is not able to move the RLE following her multiple CVA but reports that her foot feels normal.     PAST MEDICAL HISTORY: Stroke  Diabetes    PAST SURGICAL HISTORY:   History of knee surgery    HOME MEDICATIONS:  Home Medications:  aspirin 81 mg oral tablet: 1 tab(s) orally once a day (22 Jul 2016 13:39)  atorvastatin 20 mg oral tablet: 1 tab(s) orally once a day (at bedtime) (22 Jul 2016 13:39)  enalapril 10 mg oral tablet: 1 tab(s) orally once a day (22 Jul 2016 13:39)  metFORMIN 500 mg oral tablet:  orally twice a day (22 Jul 2016 13:39)  Metoprolol Succinate ER 50 mg oral tablet, extended release: 1 tab(s) orally once a day (22 Jul 2016 13:39)  omeprazole 20 mg oral delayed release capsule: 1 cap(s) orally once a day (22 Jul 2016 13:39)  Symbicort 160 mcg-4.5 mcg/inh inhalation aerosol: 2 puff(s) inhaled 2 times a day, As Needed (22 Jul 2016 13:39)    ALLERGIES: No Known Allergies    VITAL SIGNS:  ICU Vital Signs Last 24 Hrs  T(C): 36.7 (13 Mar 2019 18:00), Max: 36.8 (13 Mar 2019 12:55)  T(F): 98.1 (13 Mar 2019 18:00), Max: 98.3 (13 Mar 2019 12:55)  HR: 84 (13 Mar 2019 18:00) (64 - 84)  BP: 138/64 (13 Mar 2019 18:00) (138/64 - 162/76)  BP(mean): --  ABP: --  ABP(mean): --  RR: 17 (13 Mar 2019 18:00) (17 - 18)  SpO2: 100% (13 Mar 2019 18:00) (99% - 100%)    General: NAD, resting comfortably  Resp: unlabored breathing on RA  CV: HDS, rrr  Abd: soft, nontender, nondistended  Extr: LLE w/ dopplerable DP signal, minimal motor function; RLE w/ dopplerable DP/PT signal, no motor function (baseline), cool and dusky skin color    IMAGING:

## 2019-03-13 NOTE — CONSULT NOTE ADULT - ASSESSMENT
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    - Recommend starting patient on heparin gtt  - Patient needs CTA to further delineate lesions  - Patient may require angiogram pending imaging    Discussed with Dr. 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     - Recommend starting patient on heparin gtt  - Patient needs CTA to further delineate lesions  - Patient may require angiogram pending imaging    Discussed with Dr. Taz Bae PGY-2  Vascular Surgery Pager [phone]

## 2019-03-13 NOTE — ED PROVIDER NOTE - SKIN, MLM
Dusky feet b/l R>L, dopplerable TA and faintly dopplerable DP on R, dopplerable DP/TA on L, nontender, cool to touch R>L

## 2019-03-13 NOTE — ED ADULT NURSE NOTE - NSIMPLEMENTINTERV_GEN_ALL_ED
Implemented All Fall Risk Interventions:  [place] to call system. Call bell, personal items and telephone within reach. Instruct patient to call for assistance. Room bathroom lighting operational. Non-slip footwear when patient is off stretcher. Physically safe environment: no spills, clutter or unnecessary equipment. Stretcher in lowest position, wheels locked, appropriate side rails in place. Provide visual cue, wrist band, yellow gown, etc. Monitor gait and stability. Monitor for mental status changes and reorient to person, place, and time. Review medications for side effects contributing to fall risk. Reinforce activity limits and safety measures with patient and family.

## 2019-03-13 NOTE — H&P ADULT - NSHPPHYSICALEXAM_GEN_ALL_CORE
ICU Vital Signs Last 24 Hrs  T(C): 36.7 (13 Mar 2019 18:00), Max: 36.8 (13 Mar 2019 12:55)  T(F): 98.1 (13 Mar 2019 18:00), Max: 98.3 (13 Mar 2019 12:55)  HR: 84 (13 Mar 2019 18:00) (64 - 84)  BP: 138/64 (13 Mar 2019 18:00) (138/64 - 162/76)  BP(mean): --  ABP: --  ABP(mean): --  RR: 17 (13 Mar 2019 18:00) (17 - 18)  SpO2: 100% (13 Mar 2019 18:00) (99% - 100%)    General: NAD, resting comfortably  Resp: unlabored breathing on RA  CV: HDS, rrr  Abd: soft, nontender, nondistended  Extr: LLE w/ dopplerable DP signal, minimal motor function; RLE w/ dopplerable DP/PT signal, no motor function (baseline), cool and dusky skin color

## 2019-03-13 NOTE — H&P ADULT - HISTORY OF PRESENT ILLNESS
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 Patient is primarily Tagalog speaking, daughter at bedside translating and providing history. She states that she first noticed her mother's R foot discoloration 1 week ago and noted that the foot was cool. Patient states that she began to have pain in the foot around that time. She was evaluated in her PCP's clinic this afternoon and her PCP was unable to palpable pulses in the lower extremities bilaterally and sent her in to the ED for evaluation. At baseline, patient is not able to move the RLE following her multiple CVA but reports that her foot feels normal.

## 2019-03-13 NOTE — H&P ADULT - ASSESSMENT
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     - admit to Dr. Mcghee, vascular surgery [phone]  - NPO, IVF  - cont heparin drip  - cont home symbicort, protonix  - ISS  - holding home ASA, atorvastatin, enalapril, metoprolol, metformin    Patient discussed with Dr. Mcghee

## 2019-03-13 NOTE — ED PROVIDER NOTE - NS ED ROS FT
No fever, no chills, no change in vision, no throat pain, no chest pain, no abdominal pain, no joint pain, no rashes,,  all ROS otherwise as per HPI or negative.

## 2019-03-13 NOTE — ED PROVIDER NOTE - ATTENDING CONTRIBUTION TO CARE
Attending MD Jackson: I personally have seen and examined this patient.  Resident note reviewed and agree on plan of care and except where noted.  See below for details.     Seen in [place] [facility], accompanied by family Attending MD Jackson: I personally have seen and examined this patient.  Resident note reviewed and agree on plan of care and except where noted.  See below for details.     Seen in [place], accompanied by family    83F with PMH/PSH including PAD s/p angioplasty to R ant tib, multiple CVAs with bilateral residual, DM, HTN presents to the ED with cold R foot.  Family reports noted R foot to have a violaceous hue and noted it to be cold last week.  Report noted improvement with massage.  Reports pain having pain at foot.  Reports went to PMD who reported did not feel pulse and sent to ED.  Denies trauma, fall.  Denies chest pain, shortness of breath, palpitations. Denies trauma.  Denies abdominal pain, nausea, vomiting, diarrhea, blood in stools. Denies urinary complaints.  Denies fevers.  On exam, NAD, head NCAT, PERRL, FROM at neck, no tenderness to midline palpation, no stepoffs along length of spine, lungs CTAB with good inspiratory effort, +S1S2, no m/r/g, abdomen soft with +BS, NT, ND, no CVAT, moving all extremities, +LLE palpable DP, RLE dopplerable DP (both marked), R foot cooler than L, dusky skin as compared to left; A/P: 83F with cool foot, will obtain labs, vascular consult, imaging as per vascular, EKG

## 2019-03-13 NOTE — ED ADULT NURSE NOTE - ED STAT RN HANDOFF DETAILS
Handoff report given to Sara ARTEAGA. Understands pmh, medications given and plan of care for patient. Patient in stable condition, vital signs updated, has no complaints at this time and has been updated on care plan. Explained to patient that it is change of shift and new nurse is taking over, pt verbalized understanding.

## 2019-03-13 NOTE — H&P ADULT - NSHPLABSRESULTS_GEN_ALL_CORE
CBC (03-13 @ 13:18)                              12.6                           8.8     )----------------(  244        55.8  % Neutrophils, 30.2  % Lymphocytes, ANC: 4.9                                 37.6                  BMP (03-13 @ 13:18)             139     |  103     |  15    		Ca++ --      Ca 9.1                ---------------------------------( 144<H>		Mg --                 4.2     |  22      |  0.88  			Ph --        LFTs (03-13 @ 13:18)      TPro 6.9 / Alb 3.7 / TBili 0.3 / DBili -- / AST 21 / ALT 16 / AlkPhos 71    Coags (03-13 @ 13:18)  aPTT 32.2 / INR 0.98 / PT 11.2    ABG (03-13 @ 13:18)      /  /  /  /  / %     Lactate:   2.3<H>    VBG (03-13 @ 13:18)     7.33<L> / 52<H> / 24<L> / 26 / 0.2 / 34<L>%      IMAGING:  < from: CT Angio Abd Aorta w/run-off w/ IV Cont (03.13.19 @ 17:35) >    IMPRESSION:     Suggestion of slow flow as the right distal femoral artery and the left   popliteal artery are not opacified until delayed venous imaging.    No apparent three-vessel runoff on the RIGHT, may be secondary to slow   flowversus occlusion.    LEFT peroneal artery and anterior tibial artery are patent to the ankle   and foot, respectively. Left posterior tibial artery is occluded versus   slow flow.    Consider further evaluation with arterial duplex ultrasound.

## 2019-03-13 NOTE — ED PROVIDER NOTE - CHIEF COMPLAINT
The patient is a 83y Female complaining of The patient is a 83y Female complaining of R foot color change.

## 2019-03-13 NOTE — ED PROVIDER NOTE - CLINICAL SUMMARY MEDICAL DECISION MAKING FREE TEXT BOX
Eddie: 83F w/PAD s/p angioplasty, multiple CVAs c/b b/l paresis/paralysis, DM, htn p/w R foot color change. Spoke to patient's vascular surgeon Dr. Solis who stated that angioplasty only expected to function well for 6-9mo so believes this is chronic worsening of occlusion and patient can likely be treated OP. Will get labs and d/w vascular surgery consult here. Patient does not require analgesia currently.

## 2019-03-13 NOTE — ED ADULT NURSE NOTE - OBJECTIVE STATEMENT
83 year old female presents to the ED sent in by PMD for decreased R pedal pulse and right foot discoloration. PMH of DM, stroke, knee surgery. Pt. states right foot has been discolored for 1 week, went for routine visit, PMD found right foot to be discolored, cold, and had decreased/no pulse and was advised to come to ED to see vascular. Patient reports pain in right foot when palpated (10/10), (0/10 when at rest). Patient is bed bound/wheelchair bound and incontinent/ dependent on 24hour care by daughter and home aide after most recent stroke 2 years ago - pt. unable to walk or move right side of body. 20g periperhal IV placed in L ac and labs drawn and sent to lab. Patient undressed and placed into gown, call bell in hand and side rails up with bed in lowest position for safety. blanket provided. Comfort and safety provided.

## 2019-03-13 NOTE — ED PROVIDER NOTE - OBJECTIVE STATEMENT
83F w/PMH PAD s/p angioplasty to R ant tib artery April 2018 (also found sig SFA/pop occlusion but not intervened on), multiple CVAs c/b R paresis with mild L paresis (wheelchair/bedbound) on 81mg ASA, DM, htn p/w R foot color change x1w. Patient's HHA/family noted foot to be bluish and cold last week though improved with massage. Patient c/o pain at the site though mild. Went to PMD today who did not feel pulse and sent to ED. Patient denies trauma, change in baseline weakness/numbness, cp/sob/palp, syncope, f/c, discharge, rash.

## 2019-03-14 LAB
ANION GAP SERPL CALC-SCNC: 14 MMOL/L — SIGNIFICANT CHANGE UP (ref 5–17)
APTT BLD: >200 SEC — CRITICAL HIGH (ref 27.5–36.3)
APTT BLD: >200 SEC — CRITICAL HIGH (ref 27.5–36.3)
BUN SERPL-MCNC: 14 MG/DL — SIGNIFICANT CHANGE UP (ref 7–23)
CALCIUM SERPL-MCNC: 9.2 MG/DL — SIGNIFICANT CHANGE UP (ref 8.4–10.5)
CHLORIDE SERPL-SCNC: 103 MMOL/L — SIGNIFICANT CHANGE UP (ref 96–108)
CO2 SERPL-SCNC: 23 MMOL/L — SIGNIFICANT CHANGE UP (ref 22–31)
CREAT SERPL-MCNC: 0.85 MG/DL — SIGNIFICANT CHANGE UP (ref 0.5–1.3)
GAS PNL BLDV: SIGNIFICANT CHANGE UP
GLUCOSE BLDC GLUCOMTR-MCNC: 79 MG/DL — SIGNIFICANT CHANGE UP (ref 70–99)
GLUCOSE BLDC GLUCOMTR-MCNC: 95 MG/DL — SIGNIFICANT CHANGE UP (ref 70–99)
GLUCOSE BLDC GLUCOMTR-MCNC: 96 MG/DL — SIGNIFICANT CHANGE UP (ref 70–99)
GLUCOSE BLDC GLUCOMTR-MCNC: 97 MG/DL — SIGNIFICANT CHANGE UP (ref 70–99)
GLUCOSE SERPL-MCNC: 112 MG/DL — HIGH (ref 70–99)
HBA1C BLD-MCNC: 6.5 % — HIGH (ref 4–5.6)
HCT VFR BLD CALC: 37.7 % — SIGNIFICANT CHANGE UP (ref 34.5–45)
HCT VFR BLD CALC: 39.4 % — SIGNIFICANT CHANGE UP (ref 34.5–45)
HGB BLD-MCNC: 12.5 G/DL — SIGNIFICANT CHANGE UP (ref 11.5–15.5)
HGB BLD-MCNC: 12.9 G/DL — SIGNIFICANT CHANGE UP (ref 11.5–15.5)
INR BLD: 1.23 RATIO — HIGH (ref 0.88–1.16)
MAGNESIUM SERPL-MCNC: 1.8 MG/DL — SIGNIFICANT CHANGE UP (ref 1.6–2.6)
MCHC RBC-ENTMCNC: 29.6 PG — SIGNIFICANT CHANGE UP (ref 27–34)
MCHC RBC-ENTMCNC: 31.1 PG — SIGNIFICANT CHANGE UP (ref 27–34)
MCHC RBC-ENTMCNC: 31.7 GM/DL — LOW (ref 32–36)
MCHC RBC-ENTMCNC: 34.2 GM/DL — SIGNIFICANT CHANGE UP (ref 32–36)
MCV RBC AUTO: 90.9 FL — SIGNIFICANT CHANGE UP (ref 80–100)
MCV RBC AUTO: 93.1 FL — SIGNIFICANT CHANGE UP (ref 80–100)
PHOSPHATE SERPL-MCNC: 3.4 MG/DL — SIGNIFICANT CHANGE UP (ref 2.5–4.5)
PLATELET # BLD AUTO: 226 K/UL — SIGNIFICANT CHANGE UP (ref 150–400)
PLATELET # BLD AUTO: 253 K/UL — SIGNIFICANT CHANGE UP (ref 150–400)
POTASSIUM SERPL-MCNC: 4.2 MMOL/L — SIGNIFICANT CHANGE UP (ref 3.5–5.3)
POTASSIUM SERPL-SCNC: 4.2 MMOL/L — SIGNIFICANT CHANGE UP (ref 3.5–5.3)
PROTHROM AB SERPL-ACNC: 14.1 SEC — HIGH (ref 10–12.9)
RBC # BLD: 4.14 M/UL — SIGNIFICANT CHANGE UP (ref 3.8–5.2)
RBC # BLD: 4.23 M/UL — SIGNIFICANT CHANGE UP (ref 3.8–5.2)
RBC # FLD: 12 % — SIGNIFICANT CHANGE UP (ref 10.3–14.5)
RBC # FLD: 12.9 % — SIGNIFICANT CHANGE UP (ref 10.3–14.5)
SODIUM SERPL-SCNC: 140 MMOL/L — SIGNIFICANT CHANGE UP (ref 135–145)
WBC # BLD: 7.55 K/UL — SIGNIFICANT CHANGE UP (ref 3.8–10.5)
WBC # BLD: 8.6 K/UL — SIGNIFICANT CHANGE UP (ref 3.8–10.5)
WBC # FLD AUTO: 7.55 K/UL — SIGNIFICANT CHANGE UP (ref 3.8–10.5)
WBC # FLD AUTO: 8.6 K/UL — SIGNIFICANT CHANGE UP (ref 3.8–10.5)

## 2019-03-14 PROCEDURE — 93926 LOWER EXTREMITY STUDY: CPT | Mod: 26,RT

## 2019-03-14 PROCEDURE — 93923 UPR/LXTR ART STDY 3+ LVLS: CPT | Mod: 26

## 2019-03-14 RX ORDER — ASPIRIN/CALCIUM CARB/MAGNESIUM 324 MG
81 TABLET ORAL DAILY
Qty: 0 | Refills: 0 | Status: DISCONTINUED | OUTPATIENT
Start: 2019-03-14 | End: 2019-03-18

## 2019-03-14 RX ADMIN — Medication 81 MILLIGRAM(S): at 08:46

## 2019-03-14 RX ADMIN — HEPARIN SODIUM 0 UNIT(S)/HR: 5000 INJECTION INTRAVENOUS; SUBCUTANEOUS at 16:15

## 2019-03-14 RX ADMIN — BUDESONIDE AND FORMOTEROL FUMARATE DIHYDRATE 2 PUFF(S): 160; 4.5 AEROSOL RESPIRATORY (INHALATION) at 17:01

## 2019-03-14 RX ADMIN — HEPARIN SODIUM UNIT(S)/HR: 5000 INJECTION INTRAVENOUS; SUBCUTANEOUS at 05:43

## 2019-03-14 RX ADMIN — SODIUM CHLORIDE 75 MILLILITER(S): 9 INJECTION, SOLUTION INTRAVENOUS at 08:33

## 2019-03-14 RX ADMIN — BUDESONIDE AND FORMOTEROL FUMARATE DIHYDRATE 2 PUFF(S): 160; 4.5 AEROSOL RESPIRATORY (INHALATION) at 06:15

## 2019-03-14 RX ADMIN — HEPARIN SODIUM UNIT(S)/HR: 5000 INJECTION INTRAVENOUS; SUBCUTANEOUS at 17:15

## 2019-03-14 RX ADMIN — HEPARIN SODIUM 0 UNIT(S)/HR: 5000 INJECTION INTRAVENOUS; SUBCUTANEOUS at 04:34

## 2019-03-14 RX ADMIN — PANTOPRAZOLE SODIUM 40 MILLIGRAM(S): 20 TABLET, DELAYED RELEASE ORAL at 08:47

## 2019-03-14 NOTE — PROGRESS NOTE ADULT - SUBJECTIVE AND OBJECTIVE BOX
S: Pt seen and examined. Pt reports R foot pain improved with pain meds.    Vital Signs Last 24 Hrs  T(C): 36.6 (14 Mar 2019 08:33), Max: 37.1 (13 Mar 2019 19:45)  T(F): 97.9 (14 Mar 2019 08:33), Max: 98.7 (13 Mar 2019 19:45)  HR: 98 (14 Mar 2019 08:33) (64 - 98)  BP: 163/90 (14 Mar 2019 08:33) (138/64 - 163/90)  BP(mean): 87 (14 Mar 2019 05:45) (87 - 87)  RR: 19 (14 Mar 2019 08:33) (16 - 19)  SpO2: 100% (14 Mar 2019 08:33) (99% - 100%)      General: NAD, resting comfortably  Chest: nonlabored breathing on RA, equal chest expansion b/l  Abd: soft, nontender, nondistended  Extr: LLE w/ dopplerable DP signal, minimal motor function; RLE w/ dopplerable DP/PT signal, no motor function (baseline), cool and dusky skin color    I&O's Summary    I&O's Detail      MEDICATIONS  (STANDING):  aspirin enteric coated 81 milliGRAM(s) Oral daily  buDESOnide  80 MICROgram(s)/formoterol 4.5 MICROgram(s) Inhaler 2 Puff(s) Inhalation two times a day  dextrose 5%. 1000 milliLiter(s) (50 mL/Hr) IV Continuous <Continuous>  dextrose 50% Injectable 12.5 Gram(s) IV Push once  dextrose 50% Injectable 25 Gram(s) IV Push once  dextrose 50% Injectable 25 Gram(s) IV Push once  heparin  Infusion.  Unit(s)/Hr (10 mL/Hr) IV Continuous <Continuous>  insulin lispro (HumaLOG) corrective regimen sliding scale   SubCutaneous three times a day before meals  insulin lispro (HumaLOG) corrective regimen sliding scale   SubCutaneous at bedtime  lactated ringers. 1000 milliLiter(s) (75 mL/Hr) IV Continuous <Continuous>  pantoprazole  Injectable 40 milliGRAM(s) IV Push daily    MEDICATIONS  (PRN):  dextrose 40% Gel 15 Gram(s) Oral once PRN Blood Glucose LESS THAN 70 milliGRAM(s)/deciliter  glucagon  Injectable 1 milliGRAM(s) IntraMuscular once PRN Glucose LESS THAN 70 milligrams/deciliter  heparin  Injectable 4000 Unit(s) IV Push every 6 hours PRN For aPTT less than 40  heparin  Injectable 2000 Unit(s) IV Push every 6 hours PRN For aPTT between 40 - 57      LABS:                        12.5   7.55  )-----------( 253      ( 14 Mar 2019 09:41 )             39.4     03-14    140  |  103  |  14  ----------------------------<  112<H>  4.2   |  23  |  0.85    Ca    9.2      14 Mar 2019 06:19  Phos  3.4     03-14  Mg     1.8     03-14    TPro  6.9  /  Alb  3.7  /  TBili  0.3  /  DBili  x   /  AST  21  /  ALT  16  /  AlkPhos  71  03-13    PT/INR - ( 14 Mar 2019 03:15 )   PT: 14.1 sec;   INR: 1.23 ratio         PTT - ( 14 Mar 2019 03:15 )  PTT:>200.0 sec

## 2019-03-14 NOTE — ED ADULT NURSE REASSESSMENT NOTE - NS ED NURSE REASSESS COMMENT FT1
CBC and PTT blood work ordered for midnight, six hours after heparin drip initiated. Laboratory tests obtained and sent to laboratory at 1230. PTT reordered by laboratory due to QNS at 0148.   Blood work sent at 0230, and resulted at 0424. Heparin drip stopped at 0434 for one hour and to be resumed at lower rate at 0534. Pt placed in position of comfort. Pt educated on call bell system and provided call bell. Bed in lowest position, wheels locked, appropriate side rails raised. Pt denies needs at this time.
Heparin drip off for 60 minutes, and restarted at lower rate of 800 units per hour as per heparin nomogram protocol. Second RN BETO Donald at bedside to verify. Pt placed in position of comfort. Pt educated on call bell system and provided call bell. Bed in lowest position, wheels locked, appropriate side rails raised. Pt denies needs at this time.
Pt placed in position of comfort. Pt educated on call bell system and provided call bell. Bed in lowest position, wheels locked, appropriate side rails raised. Pt denies needs at this time. All fall risk precautions in place. Door glass clear, not in fog position in order to enhance patient visualization.
Pt remains stable in ED, heparin infusion started at 10 mL/ hour (1000 units/hour). Heparin rescanned d/t unable to add cosigner.
Received report from night CHANDLER Herrera. Pt resting in gold room 5, she is admitted and RTM. Patients sheets and diaper changed, she was repositioned. Right leg and foot is cool to touch, delayed cap refill. Motor and sensation intact. LLE warm to touch.

## 2019-03-15 ENCOUNTER — TRANSCRIPTION ENCOUNTER (OUTPATIENT)
Age: 84
End: 2019-03-15

## 2019-03-15 LAB
ANION GAP SERPL CALC-SCNC: 13 MMOL/L — SIGNIFICANT CHANGE UP (ref 5–17)
APTT BLD: 184.5 SEC — CRITICAL HIGH (ref 27.5–36.3)
APTT BLD: 64.1 SEC — HIGH (ref 27.5–36.3)
APTT BLD: 80.1 SEC — HIGH (ref 27.5–36.3)
BUN SERPL-MCNC: 14 MG/DL — SIGNIFICANT CHANGE UP (ref 7–23)
CALCIUM SERPL-MCNC: 8.6 MG/DL — SIGNIFICANT CHANGE UP (ref 8.4–10.5)
CHLORIDE SERPL-SCNC: 105 MMOL/L — SIGNIFICANT CHANGE UP (ref 96–108)
CO2 SERPL-SCNC: 23 MMOL/L — SIGNIFICANT CHANGE UP (ref 22–31)
CREAT SERPL-MCNC: 0.9 MG/DL — SIGNIFICANT CHANGE UP (ref 0.5–1.3)
GLUCOSE BLDC GLUCOMTR-MCNC: 117 MG/DL — HIGH (ref 70–99)
GLUCOSE BLDC GLUCOMTR-MCNC: 141 MG/DL — HIGH (ref 70–99)
GLUCOSE BLDC GLUCOMTR-MCNC: 232 MG/DL — HIGH (ref 70–99)
GLUCOSE BLDC GLUCOMTR-MCNC: 298 MG/DL — HIGH (ref 70–99)
GLUCOSE SERPL-MCNC: 118 MG/DL — HIGH (ref 70–99)
HCT VFR BLD CALC: 31.3 % — LOW (ref 34.5–45)
HGB BLD-MCNC: 10.8 G/DL — LOW (ref 11.5–15.5)
MAGNESIUM SERPL-MCNC: 1.6 MG/DL — SIGNIFICANT CHANGE UP (ref 1.6–2.6)
MCHC RBC-ENTMCNC: 31.8 PG — SIGNIFICANT CHANGE UP (ref 27–34)
MCHC RBC-ENTMCNC: 34.6 GM/DL — SIGNIFICANT CHANGE UP (ref 32–36)
MCV RBC AUTO: 91.9 FL — SIGNIFICANT CHANGE UP (ref 80–100)
PHOSPHATE SERPL-MCNC: 3.9 MG/DL — SIGNIFICANT CHANGE UP (ref 2.5–4.5)
PLATELET # BLD AUTO: 189 K/UL — SIGNIFICANT CHANGE UP (ref 150–400)
POTASSIUM SERPL-MCNC: 4.4 MMOL/L — SIGNIFICANT CHANGE UP (ref 3.5–5.3)
POTASSIUM SERPL-SCNC: 4.4 MMOL/L — SIGNIFICANT CHANGE UP (ref 3.5–5.3)
RBC # BLD: 3.4 M/UL — LOW (ref 3.8–5.2)
RBC # FLD: 12.1 % — SIGNIFICANT CHANGE UP (ref 10.3–14.5)
SODIUM SERPL-SCNC: 141 MMOL/L — SIGNIFICANT CHANGE UP (ref 135–145)
WBC # BLD: 5.6 K/UL — SIGNIFICANT CHANGE UP (ref 3.8–10.5)
WBC # FLD AUTO: 5.6 K/UL — SIGNIFICANT CHANGE UP (ref 3.8–10.5)

## 2019-03-15 RX ORDER — MAGNESIUM SULFATE 500 MG/ML
1 VIAL (ML) INJECTION ONCE
Qty: 0 | Refills: 0 | Status: COMPLETED | OUTPATIENT
Start: 2019-03-15 | End: 2019-03-15

## 2019-03-15 RX ORDER — APIXABAN 2.5 MG/1
2 TABLET, FILM COATED ORAL
Qty: 28 | Refills: 0 | OUTPATIENT
Start: 2019-03-15 | End: 2019-03-21

## 2019-03-15 RX ORDER — DEXTROSE MONOHYDRATE, SODIUM CHLORIDE, AND POTASSIUM CHLORIDE 50; .745; 4.5 G/1000ML; G/1000ML; G/1000ML
1000 INJECTION, SOLUTION INTRAVENOUS
Qty: 0 | Refills: 0 | Status: DISCONTINUED | OUTPATIENT
Start: 2019-03-15 | End: 2019-03-16

## 2019-03-15 RX ORDER — HEPARIN SODIUM 5000 [USP'U]/ML
600 INJECTION INTRAVENOUS; SUBCUTANEOUS
Qty: 25000 | Refills: 0 | Status: DISCONTINUED | OUTPATIENT
Start: 2019-03-15 | End: 2019-03-16

## 2019-03-15 RX ADMIN — HEPARIN SODIUM 4 UNIT(S)/HR: 5000 INJECTION INTRAVENOUS; SUBCUTANEOUS at 02:31

## 2019-03-15 RX ADMIN — HEPARIN SODIUM 4 UNIT(S)/HR: 5000 INJECTION INTRAVENOUS; SUBCUTANEOUS at 01:24

## 2019-03-15 RX ADMIN — Medication 100 GRAM(S): at 12:38

## 2019-03-15 RX ADMIN — BUDESONIDE AND FORMOTEROL FUMARATE DIHYDRATE 2 PUFF(S): 160; 4.5 AEROSOL RESPIRATORY (INHALATION) at 06:29

## 2019-03-15 RX ADMIN — DEXTROSE MONOHYDRATE, SODIUM CHLORIDE, AND POTASSIUM CHLORIDE 75 MILLILITER(S): 50; .745; 4.5 INJECTION, SOLUTION INTRAVENOUS at 02:30

## 2019-03-15 RX ADMIN — Medication 1: at 22:27

## 2019-03-15 RX ADMIN — PANTOPRAZOLE SODIUM 40 MILLIGRAM(S): 20 TABLET, DELAYED RELEASE ORAL at 12:38

## 2019-03-15 RX ADMIN — BUDESONIDE AND FORMOTEROL FUMARATE DIHYDRATE 2 PUFF(S): 160; 4.5 AEROSOL RESPIRATORY (INHALATION) at 18:37

## 2019-03-15 RX ADMIN — HEPARIN SODIUM 4 UNIT(S)/HR: 5000 INJECTION INTRAVENOUS; SUBCUTANEOUS at 17:49

## 2019-03-15 RX ADMIN — DEXTROSE MONOHYDRATE, SODIUM CHLORIDE, AND POTASSIUM CHLORIDE 75 MILLILITER(S): 50; .745; 4.5 INJECTION, SOLUTION INTRAVENOUS at 12:00

## 2019-03-15 RX ADMIN — HEPARIN SODIUM 4 UNIT(S)/HR: 5000 INJECTION INTRAVENOUS; SUBCUTANEOUS at 10:55

## 2019-03-15 RX ADMIN — Medication 2: at 19:43

## 2019-03-15 RX ADMIN — Medication 81 MILLIGRAM(S): at 12:38

## 2019-03-15 NOTE — DISCHARGE NOTE PROVIDER - NSDCACTIVITY_GEN_ALL_CORE
Walking - Indoors allowed/Do not drive or operate machinery/Do not make important decisions/Stairs allowed/Showering allowed/No heavy lifting/straining/Walking - Outdoors allowed

## 2019-03-15 NOTE — PROGRESS NOTE ADULT - SUBJECTIVE AND OBJECTIVE BOX
S: Pt seen and examined. No c/os.    Vital Signs Last 24 Hrs  T(C): 36.7 (15 Mar 2019 02:02), Max: 36.9 (14 Mar 2019 17:32)  T(F): 98.1 (15 Mar 2019 02:02), Max: 98.5 (14 Mar 2019 17:32)  HR: 93 (15 Mar 2019 02:02) (87 - 98)  BP: 132/79 (15 Mar 2019 02:02) (130/71 - 164/84)  BP(mean): --  RR: 17 (15 Mar 2019 02:02) (17 - 19)  SpO2: 96% (15 Mar 2019 02:02) (96% - 100%)        General: NAD, resting comfortably  Chest: nonlabored breathing on RA, equal chest expansion b/l  Abd: soft, nontender, nondistended  Extr: LLE w/ palpable DP signal, minimal motor function; RLE w/ palp DP/PT signal, no motor function (baseline), cool and dusky skin color  I&O's Summary    14 Mar 2019 07:01  -  15 Mar 2019 06:42  --------------------------------------------------------  IN: 711 mL / OUT: 0 mL / NET: 711 mL      I&O's Detail    14 Mar 2019 07:01  -  15 Mar 2019 06:42  --------------------------------------------------------  IN:    dextrose 5% + sodium chloride 0.9% with potassium chloride 20 mEq/L: 150 mL    heparin  Infusion.: 36 mL    lactated ringers.: 525 mL  Total IN: 711 mL    OUT:  Total OUT: 0 mL    Total NET: 711 mL          MEDICATIONS  (STANDING):  aspirin enteric coated 81 milliGRAM(s) Oral daily  buDESOnide  80 MICROgram(s)/formoterol 4.5 MICROgram(s) Inhaler 2 Puff(s) Inhalation two times a day  dextrose 5% + sodium chloride 0.9% with potassium chloride 20 mEq/L 1000 milliLiter(s) (75 mL/Hr) IV Continuous <Continuous>  dextrose 5%. 1000 milliLiter(s) (50 mL/Hr) IV Continuous <Continuous>  dextrose 50% Injectable 12.5 Gram(s) IV Push once  dextrose 50% Injectable 25 Gram(s) IV Push once  dextrose 50% Injectable 25 Gram(s) IV Push once  heparin  Infusion 600 Unit(s)/Hr (4 mL/Hr) IV Continuous <Continuous>  insulin lispro (HumaLOG) corrective regimen sliding scale   SubCutaneous three times a day before meals  insulin lispro (HumaLOG) corrective regimen sliding scale   SubCutaneous at bedtime  pantoprazole  Injectable 40 milliGRAM(s) IV Push daily    MEDICATIONS  (PRN):  dextrose 40% Gel 15 Gram(s) Oral once PRN Blood Glucose LESS THAN 70 milliGRAM(s)/deciliter  glucagon  Injectable 1 milliGRAM(s) IntraMuscular once PRN Glucose LESS THAN 70 milligrams/deciliter      LABS:                        12.5   7.55  )-----------( 253      ( 14 Mar 2019 09:41 )             39.4     03-14    140  |  103  |  14  ----------------------------<  112<H>  4.2   |  23  |  0.85    Ca    9.2      14 Mar 2019 06:19  Phos  3.4     03-14  Mg     1.8     03-14    TPro  6.9  /  Alb  3.7  /  TBili  0.3  /  DBili  x   /  AST  21  /  ALT  16  /  AlkPhos  71  03-13    PT/INR - ( 14 Mar 2019 03:15 )   PT: 14.1 sec;   INR: 1.23 ratio         PTT - ( 15 Mar 2019 00:14 )  PTT:184.5 sec S: Pt seen and examined. Reports feeling better today. No c/os.    Vital Signs Last 24 Hrs  T(C): 36.7 (15 Mar 2019 02:02), Max: 36.9 (14 Mar 2019 17:32)  T(F): 98.1 (15 Mar 2019 02:02), Max: 98.5 (14 Mar 2019 17:32)  HR: 93 (15 Mar 2019 02:02) (87 - 98)  BP: 132/79 (15 Mar 2019 02:02) (130/71 - 164/84)  BP(mean): --  RR: 17 (15 Mar 2019 02:02) (17 - 19)  SpO2: 96% (15 Mar 2019 02:02) (96% - 100%)        General: NAD, resting comfortably Tagalog speaking only  Chest: nonlabored breathing on RA, equal chest expansion b/l  Abd: soft, nontender, nondistended  Extr: LLE w/ palpable DP minimal motor function; RLE w/ palp DP/PT no motor function (baseline), cool and dusky skin color  I&O's Summary    14 Mar 2019 07:01  -  15 Mar 2019 06:42  --------------------------------------------------------  IN: 711 mL / OUT: 0 mL / NET: 711 mL      I&O's Detail    14 Mar 2019 07:01  -  15 Mar 2019 06:42  --------------------------------------------------------  IN:    dextrose 5% + sodium chloride 0.9% with potassium chloride 20 mEq/L: 150 mL    heparin  Infusion.: 36 mL    lactated ringers.: 525 mL  Total IN: 711 mL    OUT:  Total OUT: 0 mL    Total NET: 711 mL          MEDICATIONS  (STANDING):  aspirin enteric coated 81 milliGRAM(s) Oral daily  buDESOnide  80 MICROgram(s)/formoterol 4.5 MICROgram(s) Inhaler 2 Puff(s) Inhalation two times a day  dextrose 5% + sodium chloride 0.9% with potassium chloride 20 mEq/L 1000 milliLiter(s) (75 mL/Hr) IV Continuous <Continuous>  dextrose 5%. 1000 milliLiter(s) (50 mL/Hr) IV Continuous <Continuous>  dextrose 50% Injectable 12.5 Gram(s) IV Push once  dextrose 50% Injectable 25 Gram(s) IV Push once  dextrose 50% Injectable 25 Gram(s) IV Push once  heparin  Infusion 600 Unit(s)/Hr (4 mL/Hr) IV Continuous <Continuous>  insulin lispro (HumaLOG) corrective regimen sliding scale   SubCutaneous three times a day before meals  insulin lispro (HumaLOG) corrective regimen sliding scale   SubCutaneous at bedtime  pantoprazole  Injectable 40 milliGRAM(s) IV Push daily    MEDICATIONS  (PRN):  dextrose 40% Gel 15 Gram(s) Oral once PRN Blood Glucose LESS THAN 70 milliGRAM(s)/deciliter  glucagon  Injectable 1 milliGRAM(s) IntraMuscular once PRN Glucose LESS THAN 70 milligrams/deciliter      LABS:                        12.5   7.55  )-----------( 253      ( 14 Mar 2019 09:41 )             39.4     03-14    140  |  103  |  14  ----------------------------<  112<H>  4.2   |  23  |  0.85    Ca    9.2      14 Mar 2019 06:19  Phos  3.4     03-14  Mg     1.8     03-14    TPro  6.9  /  Alb  3.7  /  TBili  0.3  /  DBili  x   /  AST  21  /  ALT  16  /  AlkPhos  71  03-13    PT/INR - ( 14 Mar 2019 03:15 )   PT: 14.1 sec;   INR: 1.23 ratio         PTT - ( 15 Mar 2019 00:14 )  PTT:184.5 sec

## 2019-03-15 NOTE — DISCHARGE NOTE PROVIDER - NSDCCPTREATMENT_GEN_ALL_CORE_FT
PRINCIPAL PROCEDURE  Procedure: CT angio RT LE  Findings and Treatment: slow flow to right distal femoral artery and the le popliteal artery no opacified.

## 2019-03-15 NOTE — DISCHARGE NOTE PROVIDER - NSDCFUADDINST_GEN_ALL_CORE_FT
Please take Eliquis 5mg tab at a higher dose for first 7 days -at two tablets twice a day, then may take one tablet twice a day thereafter. Please discuss with Dr. Mcghee at your follow up the total recommended duration and obtain a new prescription for Eliquis, as needed.

## 2019-03-15 NOTE — DISCHARGE NOTE PROVIDER - INSTRUCTIONS
diabetic regular diet diabetic regular diet    Please take Eliquis 5mg tab, two tablets twice a day thru 3/22, and then take Eliquis 5mg tab, one tab twice a day starting 3/23

## 2019-03-15 NOTE — DISCHARGE NOTE PROVIDER - HOSPITAL COURSE
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 Patient is primarily Tagalog speaking, daughter at bedside translating and providing history. She states that she first noticed her mother's R foot discoloration 1 week ago and noted that the foot was cool. Patient states that she began to have pain in the foot around that time. She was evaluated in her PCP's clinic this afternoon and her PCP was unable to palpable pulses in the lower extremities bilaterally and sent her in to the ED for evaluation. At baseline, patient is not able to move the RLE following her multiple CVA but reports that her foot feels normal.     CT Angio revealed:     Suggestion of slow flow as the right distal femoral artery and the left popliteal artery are not opacified until delayed venous imaging. 	No apparent three-vessel runoff on the RIGHT, may be secondary to slow  flow versus occlusion.    LEFT peroneal artery and anterior tibial artery are patent to the ankle     	and foot, respectively. Left posterior tibial artery is occluded versus     	slow flow.        Pt was admitted to Dr. Mcghee's Vascular service. She was made NPO, IVF hydration started, and placed on hep gtt.    CASI/PVR revealed Doppler evidence of mild disease involving the right foot; although, the     right digital brachial index is markedly decreased at 0.33. Arterial duplex performed same day is more sensitive in assessing the right lower extremity arterial disease. Mild left-sided trifurcation disease.     Arterial Duplex revealed Atherosclerotic changes noted throughout the arteries of the     right lower extremity. Mild asymmetry in the systolic pressures (13 mm Hg) at the brachial     arteries as described above. Duplex imaging of the right brachiocephalic artery and bilateral subclavian arteries can be performed for further evaluation if clinically warranted.    Significant stenoses involving the proximal right superficial femoral artery, right tibioperoneal trunk, right anterior tibial artery most  prominently at the mid calf level and narrowing throughout right peroneal artery.    The right posterior tibial artery is occluded at the distal right calf     level with reconstitution at the right ankle via collaterals.        Pt's symptoms improved. Hep ggt was transitioned to PO _______________.  Physical therapy was consulted and recommended ____________________        Pt hemodynamically  stable for discharge. Pt instructed to follow up with Dr. Mcghee. 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 Patient is primarily Tagalog speaking, daughter at bedside translating and providing history. She states that she first noticed her mother's R foot discoloration 1 week ago and noted that the foot was cool. Patient states that she began to have pain in the foot around that time. She was evaluated in her PCP's clinic this afternoon and her PCP was unable to palpable pulses in the lower extremities bilaterally and sent her in to the ED for evaluation. At baseline, patient is not able to move the RLE following her multiple CVA but reports that her foot feels normal. CT Angio revealed: Suggestion of slow flow as the right distal femoral artery and the left popliteal artery are not opacified until delayed venous imaging. No apparent three-vessel runoff on the RIGHT, may be secondary to slow  flow versus occlusion. LEFT peroneal artery and anterior tibial artery are patent to the ankle and foot, respectively. Left posterior tibial artery is occluded versus slow flow.        Pt was admitted to Dr. Mcghee's Vascular service. She was made NPO, IVF hydration started, and placed on hep gtt. CASI/PVR revealed Doppler evidence of mild disease involving the right foot; although, the right digital brachial index is markedly decreased at 0.33. Arterial duplex performed same day is     more sensitive in assessing the right lower extremity arterial disease. Mild left-sided trifurcation disease. Arterial Duplex revealed Atherosclerotic changes noted throughout the arteries of the right lower extremity. Mild asymmetry in the systolic pressures (13 mm Hg) at the brachial     arteries as described above. Duplex imaging of the right brachiocephalic artery and bilateral subclavian arteries can be performed for further evaluation if clinically warranted. Significant stenoses involving the proximal right superficial femoral artery, right tibioperoneal trunk, right anterior tibial artery     most prominently at the mid calf level and narrowing throughout right peroneal artery. The right posterior tibial artery is occluded at the distal right calf level with reconstitution at the right ankle via collaterals.        Pt's symptoms improved, right foot and leg warm an well perfused with palpable pulses. Hep gttt was transitioned to oral anticoagulation.  Pt hemodynamically  stable for discharge. Pt instructed to follow up with Dr. Mcghee.

## 2019-03-15 NOTE — DISCHARGE NOTE PROVIDER - NSDCCPCAREPLAN_GEN_ALL_CORE_FT
PRINCIPAL DISCHARGE DIAGNOSIS  Diagnosis: PAD (peripheral artery disease)  Assessment and Plan of Treatment: Follow up with Dr. Mcghee in one week. Please call to schedule an appointment.   NOTIFY YOUR SURGEON IF: You have any fever (over 100.4 F) or chills, persistent nausea/vomiting, persistent diarrhea, or worsening pain.

## 2019-03-15 NOTE — DISCHARGE NOTE PROVIDER - CARE PROVIDER_API CALL
Jeferson Mcghee)  Surgery; Vascular Surgery  [address] [facility] [facility], [address]  [place] [address]  Phone: [phone]  Fax: [phone]  Follow Up Time: 1 week

## 2019-03-16 LAB
ANION GAP SERPL CALC-SCNC: 12 MMOL/L — SIGNIFICANT CHANGE UP (ref 5–17)
APTT BLD: 48 SEC — HIGH (ref 27.5–36.3)
BUN SERPL-MCNC: 17 MG/DL — SIGNIFICANT CHANGE UP (ref 7–23)
CALCIUM SERPL-MCNC: 8.1 MG/DL — LOW (ref 8.4–10.5)
CHLORIDE SERPL-SCNC: 107 MMOL/L — SIGNIFICANT CHANGE UP (ref 96–108)
CO2 SERPL-SCNC: 22 MMOL/L — SIGNIFICANT CHANGE UP (ref 22–31)
CREAT SERPL-MCNC: 0.94 MG/DL — SIGNIFICANT CHANGE UP (ref 0.5–1.3)
GLUCOSE BLDC GLUCOMTR-MCNC: 139 MG/DL — HIGH (ref 70–99)
GLUCOSE BLDC GLUCOMTR-MCNC: 151 MG/DL — HIGH (ref 70–99)
GLUCOSE BLDC GLUCOMTR-MCNC: 180 MG/DL — HIGH (ref 70–99)
GLUCOSE BLDC GLUCOMTR-MCNC: 189 MG/DL — HIGH (ref 70–99)
GLUCOSE SERPL-MCNC: 202 MG/DL — HIGH (ref 70–99)
HCT VFR BLD CALC: 30.3 % — LOW (ref 34.5–45)
HGB BLD-MCNC: 10.2 G/DL — LOW (ref 11.5–15.5)
MAGNESIUM SERPL-MCNC: 1.8 MG/DL — SIGNIFICANT CHANGE UP (ref 1.6–2.6)
MCHC RBC-ENTMCNC: 31.2 PG — SIGNIFICANT CHANGE UP (ref 27–34)
MCHC RBC-ENTMCNC: 33.8 GM/DL — SIGNIFICANT CHANGE UP (ref 32–36)
MCV RBC AUTO: 92.4 FL — SIGNIFICANT CHANGE UP (ref 80–100)
PHOSPHATE SERPL-MCNC: 2.7 MG/DL — SIGNIFICANT CHANGE UP (ref 2.5–4.5)
PLATELET # BLD AUTO: 187 K/UL — SIGNIFICANT CHANGE UP (ref 150–400)
POTASSIUM SERPL-MCNC: 4.3 MMOL/L — SIGNIFICANT CHANGE UP (ref 3.5–5.3)
POTASSIUM SERPL-SCNC: 4.3 MMOL/L — SIGNIFICANT CHANGE UP (ref 3.5–5.3)
RBC # BLD: 3.28 M/UL — LOW (ref 3.8–5.2)
RBC # FLD: 12.1 % — SIGNIFICANT CHANGE UP (ref 10.3–14.5)
SODIUM SERPL-SCNC: 141 MMOL/L — SIGNIFICANT CHANGE UP (ref 135–145)
WBC # BLD: 6.4 K/UL — SIGNIFICANT CHANGE UP (ref 3.8–10.5)
WBC # FLD AUTO: 6.4 K/UL — SIGNIFICANT CHANGE UP (ref 3.8–10.5)

## 2019-03-16 RX ORDER — SODIUM,POTASSIUM PHOSPHATES 278-250MG
1 POWDER IN PACKET (EA) ORAL ONCE
Qty: 0 | Refills: 0 | Status: COMPLETED | OUTPATIENT
Start: 2019-03-16 | End: 2019-03-16

## 2019-03-16 RX ORDER — APIXABAN 2.5 MG/1
10 TABLET, FILM COATED ORAL EVERY 12 HOURS
Qty: 0 | Refills: 0 | Status: DISCONTINUED | OUTPATIENT
Start: 2019-03-16 | End: 2019-03-18

## 2019-03-16 RX ORDER — HEPARIN SODIUM 5000 [USP'U]/ML
500 INJECTION INTRAVENOUS; SUBCUTANEOUS
Qty: 25000 | Refills: 0 | Status: DISCONTINUED | OUTPATIENT
Start: 2019-03-16 | End: 2019-03-16

## 2019-03-16 RX ADMIN — Medication 1: at 08:07

## 2019-03-16 RX ADMIN — Medication 81 MILLIGRAM(S): at 13:45

## 2019-03-16 RX ADMIN — BUDESONIDE AND FORMOTEROL FUMARATE DIHYDRATE 2 PUFF(S): 160; 4.5 AEROSOL RESPIRATORY (INHALATION) at 17:41

## 2019-03-16 RX ADMIN — HEPARIN SODIUM 5 UNIT(S)/HR: 5000 INJECTION INTRAVENOUS; SUBCUTANEOUS at 08:04

## 2019-03-16 RX ADMIN — Medication 1: at 13:45

## 2019-03-16 RX ADMIN — APIXABAN 10 MILLIGRAM(S): 2.5 TABLET, FILM COATED ORAL at 15:57

## 2019-03-16 RX ADMIN — PANTOPRAZOLE SODIUM 40 MILLIGRAM(S): 20 TABLET, DELAYED RELEASE ORAL at 13:45

## 2019-03-16 RX ADMIN — Medication 1 PACKET(S): at 23:28

## 2019-03-16 RX ADMIN — BUDESONIDE AND FORMOTEROL FUMARATE DIHYDRATE 2 PUFF(S): 160; 4.5 AEROSOL RESPIRATORY (INHALATION) at 07:00

## 2019-03-16 RX ADMIN — Medication 1: at 18:19

## 2019-03-16 NOTE — PROGRESS NOTE ADULT - SUBJECTIVE AND OBJECTIVE BOX
GENERAL SURGERY DAILY PROGRESS NOTE:     Subjective:  Pt seen and examiend. No acute events overnight. Pt w/o complaints and feeling well. Transition to eliquis today.     Objective:  General: NAD, resting comfortably   Chest: nonlabored breathing on RA, equal chest expansion b/l  Abd: soft, nontender, nondistended  Extr: LLE w/ palpable DP minimal motor function; RLE w/ palp DP/PT no motor function (baseline), warm    MEDICATIONS  (STANDING):  aspirin enteric coated 81 milliGRAM(s) Oral daily  buDESOnide  80 MICROgram(s)/formoterol 4.5 MICROgram(s) Inhaler 2 Puff(s) Inhalation two times a day  dextrose 5% + sodium chloride 0.9% with potassium chloride 20 mEq/L 1000 milliLiter(s) (75 mL/Hr) IV Continuous <Continuous>  dextrose 5%. 1000 milliLiter(s) (50 mL/Hr) IV Continuous <Continuous>  dextrose 50% Injectable 12.5 Gram(s) IV Push once  dextrose 50% Injectable 25 Gram(s) IV Push once  dextrose 50% Injectable 25 Gram(s) IV Push once  heparin  Infusion 500 Unit(s)/Hr (5 mL/Hr) IV Continuous <Continuous>  insulin lispro (HumaLOG) corrective regimen sliding scale   SubCutaneous three times a day before meals  insulin lispro (HumaLOG) corrective regimen sliding scale   SubCutaneous at bedtime  pantoprazole  Injectable 40 milliGRAM(s) IV Push daily  potassium phosphate / sodium phosphate powder 1 Packet(s) Oral once    MEDICATIONS  (PRN):  dextrose 40% Gel 15 Gram(s) Oral once PRN Blood Glucose LESS THAN 70 milliGRAM(s)/deciliter  glucagon  Injectable 1 milliGRAM(s) IntraMuscular once PRN Glucose LESS THAN 70 milligrams/deciliter      Vital Signs Last 24 Hrs  T(C): 37.1 (16 Mar 2019 05:42), Max: 37.1 (15 Mar 2019 21:27)  T(F): 98.7 (16 Mar 2019 05:42), Max: 98.7 (15 Mar 2019 21:27)  HR: 97 (16 Mar 2019 05:42) (80 - 105)  BP: 146/78 (16 Mar 2019 05:42) (133/74 - 158/72)  BP(mean): --  RR: 17 (16 Mar 2019 05:42) (16 - 18)  SpO2: 97% (16 Mar 2019 05:42) (95% - 98%)    I&O's Detail    15 Mar 2019 07:01  -  16 Mar 2019 07:00  --------------------------------------------------------  IN:    dextrose 5% + sodium chloride 0.9% with potassium chloride 20 mEq/L: 1725 mL    heparin Infusion: 92 mL    IV PiggyBack: 100 mL    Oral Fluid: 360 mL  Total IN: 2277 mL    OUT:    Voided: 650 mL  Total OUT: 650 mL    Total NET: 1627 mL      16 Mar 2019 07:01  -  16 Mar 2019 11:04  --------------------------------------------------------  IN:  Total IN: 0 mL    OUT:    Voided: 200 mL  Total OUT: 200 mL    Total NET: -200 mL          Daily     Daily     LABS:                        10.2   6.4   )-----------( 187      ( 16 Mar 2019 06:47 )             30.3     03-16    141  |  107  |  17  ----------------------------<  202<H>  4.3   |  22  |  0.94    Ca    8.1<L>      16 Mar 2019 06:47  Phos  2.7     03-16  Mg     1.8     03-16      PTT - ( 16 Mar 2019 06:47 )  PTT:48.0 sec      RADIOLOGY & ADDITIONAL STUDIES:

## 2019-03-17 LAB
ANION GAP SERPL CALC-SCNC: 12 MMOL/L — SIGNIFICANT CHANGE UP (ref 5–17)
BUN SERPL-MCNC: 11 MG/DL — SIGNIFICANT CHANGE UP (ref 7–23)
CALCIUM SERPL-MCNC: 8.7 MG/DL — SIGNIFICANT CHANGE UP (ref 8.4–10.5)
CHLORIDE SERPL-SCNC: 106 MMOL/L — SIGNIFICANT CHANGE UP (ref 96–108)
CO2 SERPL-SCNC: 22 MMOL/L — SIGNIFICANT CHANGE UP (ref 22–31)
CREAT SERPL-MCNC: 0.85 MG/DL — SIGNIFICANT CHANGE UP (ref 0.5–1.3)
GLUCOSE BLDC GLUCOMTR-MCNC: 119 MG/DL — HIGH (ref 70–99)
GLUCOSE BLDC GLUCOMTR-MCNC: 126 MG/DL — HIGH (ref 70–99)
GLUCOSE BLDC GLUCOMTR-MCNC: 129 MG/DL — HIGH (ref 70–99)
GLUCOSE BLDC GLUCOMTR-MCNC: 188 MG/DL — HIGH (ref 70–99)
GLUCOSE SERPL-MCNC: 133 MG/DL — HIGH (ref 70–99)
HCT VFR BLD CALC: 34.8 % — SIGNIFICANT CHANGE UP (ref 34.5–45)
HGB BLD-MCNC: 11.5 G/DL — SIGNIFICANT CHANGE UP (ref 11.5–15.5)
MAGNESIUM SERPL-MCNC: 1.7 MG/DL — SIGNIFICANT CHANGE UP (ref 1.6–2.6)
MCHC RBC-ENTMCNC: 30.9 PG — SIGNIFICANT CHANGE UP (ref 27–34)
MCHC RBC-ENTMCNC: 32.9 GM/DL — SIGNIFICANT CHANGE UP (ref 32–36)
MCV RBC AUTO: 93.8 FL — SIGNIFICANT CHANGE UP (ref 80–100)
PHOSPHATE SERPL-MCNC: 3.2 MG/DL — SIGNIFICANT CHANGE UP (ref 2.5–4.5)
PLATELET # BLD AUTO: 190 K/UL — SIGNIFICANT CHANGE UP (ref 150–400)
POTASSIUM SERPL-MCNC: 4.3 MMOL/L — SIGNIFICANT CHANGE UP (ref 3.5–5.3)
POTASSIUM SERPL-SCNC: 4.3 MMOL/L — SIGNIFICANT CHANGE UP (ref 3.5–5.3)
RBC # BLD: 3.71 M/UL — LOW (ref 3.8–5.2)
RBC # FLD: 12.3 % — SIGNIFICANT CHANGE UP (ref 10.3–14.5)
SODIUM SERPL-SCNC: 140 MMOL/L — SIGNIFICANT CHANGE UP (ref 135–145)
WBC # BLD: 6.4 K/UL — SIGNIFICANT CHANGE UP (ref 3.8–10.5)
WBC # FLD AUTO: 6.4 K/UL — SIGNIFICANT CHANGE UP (ref 3.8–10.5)

## 2019-03-17 PROCEDURE — 99232 SBSQ HOSP IP/OBS MODERATE 35: CPT

## 2019-03-17 RX ORDER — ATORVASTATIN CALCIUM 80 MG/1
20 TABLET, FILM COATED ORAL AT BEDTIME
Qty: 0 | Refills: 0 | Status: DISCONTINUED | OUTPATIENT
Start: 2019-03-17 | End: 2019-03-18

## 2019-03-17 RX ORDER — METOPROLOL TARTRATE 50 MG
50 TABLET ORAL DAILY
Qty: 0 | Refills: 0 | Status: DISCONTINUED | OUTPATIENT
Start: 2019-03-17 | End: 2019-03-18

## 2019-03-17 RX ORDER — PANTOPRAZOLE SODIUM 20 MG/1
40 TABLET, DELAYED RELEASE ORAL
Qty: 0 | Refills: 0 | Status: DISCONTINUED | OUTPATIENT
Start: 2019-03-17 | End: 2019-03-17

## 2019-03-17 RX ORDER — PANTOPRAZOLE SODIUM 20 MG/1
40 TABLET, DELAYED RELEASE ORAL
Qty: 0 | Refills: 0 | Status: DISCONTINUED | OUTPATIENT
Start: 2019-03-17 | End: 2019-03-18

## 2019-03-17 RX ORDER — MAGNESIUM SULFATE 500 MG/ML
1 VIAL (ML) INJECTION ONCE
Qty: 0 | Refills: 0 | Status: COMPLETED | OUTPATIENT
Start: 2019-03-17 | End: 2019-03-17

## 2019-03-17 RX ADMIN — Medication 10 MILLIGRAM(S): at 06:04

## 2019-03-17 RX ADMIN — Medication 1: at 12:27

## 2019-03-17 RX ADMIN — BUDESONIDE AND FORMOTEROL FUMARATE DIHYDRATE 2 PUFF(S): 160; 4.5 AEROSOL RESPIRATORY (INHALATION) at 06:06

## 2019-03-17 RX ADMIN — ATORVASTATIN CALCIUM 20 MILLIGRAM(S): 80 TABLET, FILM COATED ORAL at 22:00

## 2019-03-17 RX ADMIN — PANTOPRAZOLE SODIUM 40 MILLIGRAM(S): 20 TABLET, DELAYED RELEASE ORAL at 06:06

## 2019-03-17 RX ADMIN — Medication 100 GRAM(S): at 16:12

## 2019-03-17 RX ADMIN — BUDESONIDE AND FORMOTEROL FUMARATE DIHYDRATE 2 PUFF(S): 160; 4.5 AEROSOL RESPIRATORY (INHALATION) at 17:47

## 2019-03-17 RX ADMIN — Medication 50 MILLIGRAM(S): at 06:06

## 2019-03-17 RX ADMIN — APIXABAN 10 MILLIGRAM(S): 2.5 TABLET, FILM COATED ORAL at 17:47

## 2019-03-17 RX ADMIN — APIXABAN 10 MILLIGRAM(S): 2.5 TABLET, FILM COATED ORAL at 06:06

## 2019-03-17 RX ADMIN — Medication 81 MILLIGRAM(S): at 12:27

## 2019-03-17 NOTE — PROGRESS NOTE ADULT - SUBJECTIVE AND OBJECTIVE BOX
Surgery Progress Note    S:     Patient seen and examined. No acute events overnight. No complaints this morning.    O:    Vital Signs Last 24 Hrs  T(C): 36.8 (17 Mar 2019 09:11), Max: 37.4 (16 Mar 2019 17:16)  T(F): 98.3 (17 Mar 2019 09:11), Max: 99.3 (16 Mar 2019 17:16)  HR: 70 (17 Mar 2019 09:11) (70 - 83)  BP: 140/72 (17 Mar 2019 09:45) (137/74 - 167/81)  BP(mean): --  RR: 16 (17 Mar 2019 09:11) (16 - 18)  SpO2: 97% (17 Mar 2019 09:11) (96% - 100%)    Physical exam:  General: NAD, resting comfortably   Chest: nonlabored breathing on RA, equal chest expansion b/l  Abd: soft, nontender, nondistended  Extr: LLE w/ palpable DP minimal motor function; RLE w/ palp DP/PT, warm    I&O's Detail    16 Mar 2019 07:01  -  17 Mar 2019 07:00  --------------------------------------------------------  IN:    Oral Fluid: 540 mL  Total IN: 540 mL    OUT:    Voided: 1400 mL  Total OUT: 1400 mL    Total NET: -860 mL      17 Mar 2019 07:01  -  17 Mar 2019 12:25  --------------------------------------------------------  IN:    Oral Fluid: 240 mL  Total IN: 240 mL    OUT:    Voided: 300 mL  Total OUT: 300 mL    Total NET: -60 mL          MEDICATIONS  (STANDING):  apixaban 10 milliGRAM(s) Oral every 12 hours  aspirin enteric coated 81 milliGRAM(s) Oral daily  atorvastatin 20 milliGRAM(s) Oral at bedtime  buDESOnide  80 MICROgram(s)/formoterol 4.5 MICROgram(s) Inhaler 2 Puff(s) Inhalation two times a day  dextrose 5%. 1000 milliLiter(s) (50 mL/Hr) IV Continuous <Continuous>  dextrose 50% Injectable 12.5 Gram(s) IV Push once  dextrose 50% Injectable 25 Gram(s) IV Push once  dextrose 50% Injectable 25 Gram(s) IV Push once  enalapril 10 milliGRAM(s) Oral daily  insulin lispro (HumaLOG) corrective regimen sliding scale   SubCutaneous three times a day before meals  insulin lispro (HumaLOG) corrective regimen sliding scale   SubCutaneous at bedtime  metoprolol succinate ER 50 milliGRAM(s) Oral daily  pantoprazole    Tablet 40 milliGRAM(s) Oral before breakfast    MEDICATIONS  (PRN):  dextrose 40% Gel 15 Gram(s) Oral once PRN Blood Glucose LESS THAN 70 milliGRAM(s)/deciliter  glucagon  Injectable 1 milliGRAM(s) IntraMuscular once PRN Glucose LESS THAN 70 milligrams/deciliter      Labs:                          11.5   6.4   )-----------( 190      ( 17 Mar 2019 07:21 )             34.8       03-17    140  |  106  |  11  ----------------------------<  133<H>  4.3   |  22  |  0.85    Ca    8.7      17 Mar 2019 07:19  Phos  3.2     03-17  Mg     1.7     03-17        Radiology:  no new imaging

## 2019-03-18 ENCOUNTER — TRANSCRIPTION ENCOUNTER (OUTPATIENT)
Age: 84
End: 2019-03-18

## 2019-03-18 VITALS
TEMPERATURE: 99 F | SYSTOLIC BLOOD PRESSURE: 129 MMHG | OXYGEN SATURATION: 99 % | HEART RATE: 72 BPM | RESPIRATION RATE: 18 BRPM | DIASTOLIC BLOOD PRESSURE: 71 MMHG

## 2019-03-18 LAB
ANION GAP SERPL CALC-SCNC: 12 MMOL/L — SIGNIFICANT CHANGE UP (ref 5–17)
APTT BLD: 35.8 SEC — SIGNIFICANT CHANGE UP (ref 27.5–36.3)
BUN SERPL-MCNC: 20 MG/DL — SIGNIFICANT CHANGE UP (ref 7–23)
CALCIUM SERPL-MCNC: 8.8 MG/DL — SIGNIFICANT CHANGE UP (ref 8.4–10.5)
CHLORIDE SERPL-SCNC: 105 MMOL/L — SIGNIFICANT CHANGE UP (ref 96–108)
CO2 SERPL-SCNC: 23 MMOL/L — SIGNIFICANT CHANGE UP (ref 22–31)
CREAT SERPL-MCNC: 0.93 MG/DL — SIGNIFICANT CHANGE UP (ref 0.5–1.3)
GLUCOSE BLDC GLUCOMTR-MCNC: 124 MG/DL — HIGH (ref 70–99)
GLUCOSE SERPL-MCNC: 135 MG/DL — HIGH (ref 70–99)
HCT VFR BLD CALC: 35.3 % — SIGNIFICANT CHANGE UP (ref 34.5–45)
HGB BLD-MCNC: 11.8 G/DL — SIGNIFICANT CHANGE UP (ref 11.5–15.5)
INR BLD: 1.7 RATIO — HIGH (ref 0.88–1.16)
MAGNESIUM SERPL-MCNC: 2.1 MG/DL — SIGNIFICANT CHANGE UP (ref 1.6–2.6)
MCHC RBC-ENTMCNC: 31 PG — SIGNIFICANT CHANGE UP (ref 27–34)
MCHC RBC-ENTMCNC: 33.3 GM/DL — SIGNIFICANT CHANGE UP (ref 32–36)
MCV RBC AUTO: 93.1 FL — SIGNIFICANT CHANGE UP (ref 80–100)
PHOSPHATE SERPL-MCNC: 3.6 MG/DL — SIGNIFICANT CHANGE UP (ref 2.5–4.5)
PLATELET # BLD AUTO: 205 K/UL — SIGNIFICANT CHANGE UP (ref 150–400)
POTASSIUM SERPL-MCNC: 4.2 MMOL/L — SIGNIFICANT CHANGE UP (ref 3.5–5.3)
POTASSIUM SERPL-SCNC: 4.2 MMOL/L — SIGNIFICANT CHANGE UP (ref 3.5–5.3)
PROTHROM AB SERPL-ACNC: 19.7 SEC — HIGH (ref 10–12.9)
RBC # BLD: 3.79 M/UL — LOW (ref 3.8–5.2)
RBC # FLD: 12.3 % — SIGNIFICANT CHANGE UP (ref 10.3–14.5)
SODIUM SERPL-SCNC: 140 MMOL/L — SIGNIFICANT CHANGE UP (ref 135–145)
WBC # BLD: 6.8 K/UL — SIGNIFICANT CHANGE UP (ref 3.8–10.5)
WBC # FLD AUTO: 6.8 K/UL — SIGNIFICANT CHANGE UP (ref 3.8–10.5)

## 2019-03-18 PROCEDURE — 84132 ASSAY OF SERUM POTASSIUM: CPT

## 2019-03-18 PROCEDURE — 82803 BLOOD GASES ANY COMBINATION: CPT

## 2019-03-18 PROCEDURE — 85014 HEMATOCRIT: CPT

## 2019-03-18 PROCEDURE — 93926 LOWER EXTREMITY STUDY: CPT

## 2019-03-18 PROCEDURE — 80053 COMPREHEN METABOLIC PANEL: CPT

## 2019-03-18 PROCEDURE — 93923 UPR/LXTR ART STDY 3+ LVLS: CPT

## 2019-03-18 PROCEDURE — 85730 THROMBOPLASTIN TIME PARTIAL: CPT

## 2019-03-18 PROCEDURE — 84100 ASSAY OF PHOSPHORUS: CPT

## 2019-03-18 PROCEDURE — 80048 BASIC METABOLIC PNL TOTAL CA: CPT

## 2019-03-18 PROCEDURE — 82435 ASSAY OF BLOOD CHLORIDE: CPT

## 2019-03-18 PROCEDURE — 83605 ASSAY OF LACTIC ACID: CPT

## 2019-03-18 PROCEDURE — 84295 ASSAY OF SERUM SODIUM: CPT

## 2019-03-18 PROCEDURE — 82947 ASSAY GLUCOSE BLOOD QUANT: CPT

## 2019-03-18 PROCEDURE — 85610 PROTHROMBIN TIME: CPT

## 2019-03-18 PROCEDURE — 82330 ASSAY OF CALCIUM: CPT

## 2019-03-18 PROCEDURE — 85027 COMPLETE CBC AUTOMATED: CPT

## 2019-03-18 PROCEDURE — 96374 THER/PROPH/DIAG INJ IV PUSH: CPT

## 2019-03-18 PROCEDURE — 94640 AIRWAY INHALATION TREATMENT: CPT

## 2019-03-18 PROCEDURE — 99285 EMERGENCY DEPT VISIT HI MDM: CPT | Mod: 25

## 2019-03-18 PROCEDURE — 82962 GLUCOSE BLOOD TEST: CPT

## 2019-03-18 PROCEDURE — 75635 CT ANGIO ABDOMINAL ARTERIES: CPT

## 2019-03-18 PROCEDURE — 83036 HEMOGLOBIN GLYCOSYLATED A1C: CPT

## 2019-03-18 PROCEDURE — 83735 ASSAY OF MAGNESIUM: CPT

## 2019-03-18 RX ORDER — APIXABAN 2.5 MG/1
2 TABLET, FILM COATED ORAL
Qty: 20 | Refills: 0 | OUTPATIENT
Start: 2019-03-18 | End: 2019-03-22

## 2019-03-18 RX ORDER — APIXABAN 2.5 MG/1
2 TABLET, FILM COATED ORAL
Qty: 20 | Refills: 0
Start: 2019-03-18 | End: 2019-03-22

## 2019-03-18 RX ADMIN — APIXABAN 10 MILLIGRAM(S): 2.5 TABLET, FILM COATED ORAL at 05:38

## 2019-03-18 RX ADMIN — Medication 81 MILLIGRAM(S): at 11:17

## 2019-03-18 RX ADMIN — Medication 50 MILLIGRAM(S): at 05:38

## 2019-03-18 RX ADMIN — Medication 10 MILLIGRAM(S): at 05:38

## 2019-03-18 RX ADMIN — PANTOPRAZOLE SODIUM 40 MILLIGRAM(S): 20 TABLET, DELAYED RELEASE ORAL at 05:38

## 2019-03-18 RX ADMIN — BUDESONIDE AND FORMOTEROL FUMARATE DIHYDRATE 2 PUFF(S): 160; 4.5 AEROSOL RESPIRATORY (INHALATION) at 05:37

## 2019-03-18 NOTE — PROGRESS NOTE ADULT - SUBJECTIVE AND OBJECTIVE BOX
Surgery Progress Note    S:     Patient seen and examined. No acute events overnight. No complaints this morning.    O:    Vital Signs Last 24 Hrs  T(C): 36.8 (18 Mar 2019 05:05), Max: 37.1 (17 Mar 2019 13:24)  T(F): 98.3 (18 Mar 2019 05:05), Max: 98.8 (17 Mar 2019 13:24)  HR: 77 (18 Mar 2019 05:05) (70 - 81)  BP: 162/85 (18 Mar 2019 05:05) (134/68 - 162/85)  BP(mean): --  RR: 16 (18 Mar 2019 05:05) (16 - 189)  SpO2: 97% (18 Mar 2019 05:05) (96% - 98%)    Physical exam:  General: NAD, resting comfortably   Chest: nonlabored breathing on RA, equal chest expansion b/l  Abd: soft, nontender, nondistended  Extr: LLE w/ palpable DP minimal motor function; RLE w/ palp DP/PT, warm      I&O's Detail    17 Mar 2019 07:01  -  18 Mar 2019 07:00  --------------------------------------------------------  IN:    Oral Fluid: 1160 mL  Total IN: 1160 mL    OUT:    Voided: 600 mL  Total OUT: 600 mL    Total NET: 560 mL                MEDICATIONS  (STANDING):  apixaban 10 milliGRAM(s) Oral every 12 hours  aspirin enteric coated 81 milliGRAM(s) Oral daily  atorvastatin 20 milliGRAM(s) Oral at bedtime  buDESOnide  80 MICROgram(s)/formoterol 4.5 MICROgram(s) Inhaler 2 Puff(s) Inhalation two times a day  dextrose 5%. 1000 milliLiter(s) (50 mL/Hr) IV Continuous <Continuous>  dextrose 50% Injectable 12.5 Gram(s) IV Push once  dextrose 50% Injectable 25 Gram(s) IV Push once  dextrose 50% Injectable 25 Gram(s) IV Push once  enalapril 10 milliGRAM(s) Oral daily  insulin lispro (HumaLOG) corrective regimen sliding scale   SubCutaneous three times a day before meals  insulin lispro (HumaLOG) corrective regimen sliding scale   SubCutaneous at bedtime  metoprolol succinate ER 50 milliGRAM(s) Oral daily  pantoprazole    Tablet 40 milliGRAM(s) Oral before breakfast    MEDICATIONS  (PRN):  dextrose 40% Gel 15 Gram(s) Oral once PRN Blood Glucose LESS THAN 70 milliGRAM(s)/deciliter  glucagon  Injectable 1 milliGRAM(s) IntraMuscular once PRN Glucose LESS THAN 70 milligrams/deciliter      Labs:                          11.8   6.8   )-----------( 205      ( 18 Mar 2019 06:47 )             35.3     03-18    140  |  105  |  20  ----------------------------<  135<H>  4.2   |  23  |  0.93    Ca    8.8      18 Mar 2019 06:46  Phos  3.6     03-18  Mg     2.1     03-18          Radiology:  no new imaging

## 2019-03-18 NOTE — PROGRESS NOTE ADULT - ASSESSMENT
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     - Continue heparin gtt, transition to eliquis  - pain control  - PT consult  - dspio planning    [id number]
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     - Continue heparin gtt  -  f/u CASI/PVRs results  - f/u arterial duplex RLE  - pain control  - PT consult  - d/w attending A/C plan to oral formualtion  - dspio planning    [id number]
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     - Continue heparin gtt  - Patient needs CTA to further delineate lesions  - CASI/PVRs  - arterial duplex RLE  - pain control  -PT consult    [id number]
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     - Continue heparin gtt, transition to eliquis  - pain control  - PT consult  - dspio planning - Monday pending reinstatement of home aid    [id number]
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     - d/c heparin gtt, transition to eliquis  - pain control  - PT consult  - dispo planning - today pending reinstatement of home aid    [id number]

## 2019-03-18 NOTE — PROGRESS NOTE ADULT - REASON FOR ADMISSION
Right LE arterial insufficiency

## 2019-03-18 NOTE — DISCHARGE NOTE NURSING/CASE MANAGEMENT/SOCIAL WORK - NSDCDPATPORTLINK_GEN_ALL_CORE
You can access the [facility], offered by [facility], by registering with the following website: [email or web address]

## 2019-03-20 ENCOUNTER — OTHER (OUTPATIENT)
Age: 84
End: 2019-03-20

## 2019-03-21 RX ORDER — APIXABAN 2.5 MG/1
1 TABLET, FILM COATED ORAL
Qty: 60 | Refills: 0 | OUTPATIENT
Start: 2019-03-21 | End: 2019-04-19

## 2019-03-23 RX ORDER — APIXABAN 2.5 MG/1
1 TABLET, FILM COATED ORAL
Qty: 60 | Refills: 0
Start: 2019-03-23 | End: 2019-04-21

## 2019-05-28 ENCOUNTER — RX RENEWAL (OUTPATIENT)
Age: 84
End: 2019-05-28

## 2019-06-19 ENCOUNTER — RX RENEWAL (OUTPATIENT)
Age: 84
End: 2019-06-19

## 2019-06-19 ENCOUNTER — APPOINTMENT (OUTPATIENT)
Age: 84
End: 2019-06-19
Payer: MEDICARE

## 2019-06-19 VITALS
OXYGEN SATURATION: 98 % | DIASTOLIC BLOOD PRESSURE: 60 MMHG | HEART RATE: 75 BPM | SYSTOLIC BLOOD PRESSURE: 100 MMHG | TEMPERATURE: 98.4 F

## 2019-06-19 DIAGNOSIS — M15.9 POLYOSTEOARTHRITIS, UNSPECIFIED: ICD-10-CM

## 2019-06-19 PROCEDURE — 99215 OFFICE O/P EST HI 40 MIN: CPT

## 2019-06-19 NOTE — HISTORY OF PRESENT ILLNESS
[No episodes] : No hypoglycemic episodes since the last visit. [Review glucose log over ___ months] : Glucose logs reviewed over the past [unfilled] months reveal: [Regular podiatrist visits] : Patient had regular podiatrist visits [Fasting:  ___] : Fasting Blood Sugar: [unfilled] mg/dL [Understanding of foot care] : Patient expressed understanding of foot care [Most Recent A1C: ___] : Most recent A1C was [unfilled] [No therapy] : Patient is not on statin therapy [Target goal met] : A1C target goal met [de-identified] : Here for f/u diabetes mellitus 2, denies chest pain , sob , fever, chills. c/o cough with clear phlegm , sneezing and nasal stuffiness for 1 week. He blood sugar is under control. needs vaccines. no complaints. Here with Ladan Cordon , home health aide. Also needs forms filled out for Elder care. She is transferred with [facility]. She needs referral for physical therapy.  [FreeTextEntry1] : here for f/u dm-2.  [Retinopathy] : No retinopathy [EyeExamDate] : 04/2019

## 2019-06-19 NOTE — PHYSICAL EXAM
[No Acute Distress] : no acute distress [Well Developed] : well developed [Well Nourished] : well nourished [Normal Sclera/Conjunctiva] : normal sclera/conjunctiva [Well-Appearing] : well-appearing [EOMI] : extraocular movements intact [PERRL] : pupils equal round and reactive to light [No JVD] : no jugular venous distention [No Lymphadenopathy] : no lymphadenopathy [Supple] : supple [Thyroid Normal, No Nodules] : the thyroid was normal and there were no nodules present [No Respiratory Distress] : no respiratory distress  [Clear to Auscultation] : lungs were clear to auscultation bilaterally [No Accessory Muscle Use] : no accessory muscle use [Regular Rhythm] : with a regular rhythm [Normal Rate] : normal rate  [Normal S1, S2] : normal S1 and S2 [No Carotid Bruits] : no carotid bruits [No Murmur] : no murmur heard [No Abdominal Bruit] : a ~M bruit was not heard ~T in the abdomen [Pedal Pulses Present] : the pedal pulses are present [No Varicosities] : no varicosities [No Edema] : there was no peripheral edema [No Extremity Clubbing/Cyanosis] : no extremity clubbing/cyanosis [No Palpable Aorta] : no palpable aorta [Soft] : abdomen soft [Non Tender] : non-tender [No Masses] : no abdominal mass palpated [Non-distended] : non-distended [No HSM] : no HSM [Normal Bowel Sounds] : normal bowel sounds [Normal Anterior Cervical Nodes] : no anterior cervical lymphadenopathy [Normal Posterior Cervical Nodes] : no posterior cervical lymphadenopathy [No Spinal Tenderness] : no spinal tenderness [No CVA Tenderness] : no CVA  tenderness [No Rash] : no rash [Normal Affect] : the affect was normal [Deep Tendon Reflexes (DTR)] : deep tendon reflexes were 2+ and symmetric [Normal Insight/Judgement] : insight and judgment were intact [Comprehensive Foot Exam Normal] : Right and left foot were examined and both feet are normal. No ulcers in either foot. Toes are normal and with full ROM.  Normal tactile sensation with monofilament testing throughout both feet [de-identified] : weakness generalized, pt. is wheelchair bound [de-identified] : weakness on right side

## 2019-06-19 NOTE — PHYSICAL EXAM
[No Acute Distress] : no acute distress [Well Nourished] : well nourished [Well Developed] : well developed [Well-Appearing] : well-appearing [Normal Sclera/Conjunctiva] : normal sclera/conjunctiva [PERRL] : pupils equal round and reactive to light [EOMI] : extraocular movements intact [No JVD] : no jugular venous distention [Supple] : supple [No Lymphadenopathy] : no lymphadenopathy [Thyroid Normal, No Nodules] : the thyroid was normal and there were no nodules present [No Respiratory Distress] : no respiratory distress  [Clear to Auscultation] : lungs were clear to auscultation bilaterally [No Accessory Muscle Use] : no accessory muscle use [Normal Rate] : normal rate  [Regular Rhythm] : with a regular rhythm [Normal S1, S2] : normal S1 and S2 [No Murmur] : no murmur heard [No Carotid Bruits] : no carotid bruits [No Abdominal Bruit] : a ~M bruit was not heard ~T in the abdomen [No Varicosities] : no varicosities [Pedal Pulses Present] : the pedal pulses are present [No Edema] : there was no peripheral edema [No Extremity Clubbing/Cyanosis] : no extremity clubbing/cyanosis [No Palpable Aorta] : no palpable aorta [Soft] : abdomen soft [Non-distended] : non-distended [Non Tender] : non-tender [No Masses] : no abdominal mass palpated [No HSM] : no HSM [Normal Bowel Sounds] : normal bowel sounds [Normal Posterior Cervical Nodes] : no posterior cervical lymphadenopathy [Normal Anterior Cervical Nodes] : no anterior cervical lymphadenopathy [No Spinal Tenderness] : no spinal tenderness [No CVA Tenderness] : no CVA  tenderness [Deep Tendon Reflexes (DTR)] : deep tendon reflexes were 2+ and symmetric [Normal Affect] : the affect was normal [Normal Insight/Judgement] : insight and judgment were intact [de-identified] : weakness generalized, pt. is wheelchair bound [Comprehensive Foot Exam Normal] : Right and left foot were examined and both feet are normal. No ulcers in either foot. Toes are normal and with full ROM.  Normal tactile sensation with monofilament testing throughout both feet [de-identified] : bluish  discoloration of right foot [de-identified] : weakness on right side

## 2019-06-19 NOTE — HEALTH RISK ASSESSMENT
[No falls in past year] : Patient reported no falls in the past year [0] : 2) Feeling down, depressed, or hopeless: Not at all (0) [Patient declined mammogram] : Patient declined mammogram [Patient declined bone density test] : Patient declined bone density test [Patient declined PAP Smear] : Patient declined PAP Smear [Patient declined colonoscopy] : Patient declined colonoscopy [HIV test declined] : HIV test declined [Hepatitis C test declined] : Hepatitis C test declined [None] : None [With Family] : lives with family [Retired] : retired [Feels Safe at Home] : Feels safe at home [] :  [Independent] : feeding [Some assistance needed] : using telephone [Full assistance needed] : managing medications [Reports changes in hearing] : Reports changes in hearing [] : No [de-identified] : cardiology 2 week ago [[id number]] : 0 [Change in mental status noted] : No change in mental status noted [Language] : denies difficulty with language [Sexually Active] : not sexually active [Reports changes in vision] : Reports no changes in vision [de-identified] : with daughter Sonia [de-identified] : upper denture

## 2019-06-19 NOTE — PLAN
[FreeTextEntry1] :  continue diabetic diet, current meds. \par Flonase nasal spray and antiallergy medicine. \par  elder care forms filled out.

## 2019-06-19 NOTE — COUNSELING
[Healthy eating counseling provided] : healthy eating [Behavioral health counseling provided] : behavioral health  [Engage in a relaxing activity] : Engage in a relaxing activity [Needs reinforcement, provided] : Patient needs reinforcement on understanding lifestyle changes and  the steps needed to achieve self management goals and reinforcement was provided

## 2019-06-19 NOTE — HEALTH RISK ASSESSMENT
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

## 2019-06-19 NOTE — COUNSELING
[Activity counseling provided] : activity [Behavioral health counseling provided] : behavioral health  [Engage in a relaxing activity] : Engage in a relaxing activity [None] : None [Good understanding] : Patient has a good understanding of lifestyle changes and the steps needed to achieve self management goals

## 2019-07-02 ENCOUNTER — MEDICATION RENEWAL (OUTPATIENT)
Age: 84
End: 2019-07-02

## 2019-07-05 ENCOUNTER — MEDICATION RENEWAL (OUTPATIENT)
Age: 84
End: 2019-07-05

## 2019-07-05 RX ORDER — LANCETS 33 GAUGE
EACH MISCELLANEOUS
Qty: 1 | Refills: 5 | Status: COMPLETED | COMMUNITY
Start: 2017-09-08 | End: 2019-07-05

## 2019-07-09 ENCOUNTER — OTHER (OUTPATIENT)
Age: 84
End: 2019-07-09

## 2019-07-10 ENCOUNTER — RX RENEWAL (OUTPATIENT)
Age: 84
End: 2019-07-10

## 2019-07-15 ENCOUNTER — RX RENEWAL (OUTPATIENT)
Age: 84
End: 2019-07-15

## 2019-07-29 ENCOUNTER — MEDICATION RENEWAL (OUTPATIENT)
Age: 84
End: 2019-07-29

## 2019-08-21 ENCOUNTER — RX RENEWAL (OUTPATIENT)
Age: 84
End: 2019-08-21

## 2019-08-26 ENCOUNTER — RX RENEWAL (OUTPATIENT)
Age: 84
End: 2019-08-26

## 2019-09-16 ENCOUNTER — APPOINTMENT (OUTPATIENT)
Dept: FAMILY MEDICINE | Facility: CLINIC | Age: 84
End: 2019-09-16
Payer: MEDICARE

## 2019-09-16 VITALS
TEMPERATURE: 98 F | DIASTOLIC BLOOD PRESSURE: 60 MMHG | RESPIRATION RATE: 15 BRPM | SYSTOLIC BLOOD PRESSURE: 105 MMHG | HEART RATE: 74 BPM | OXYGEN SATURATION: 98 %

## 2019-09-16 DIAGNOSIS — Z92.29 PERSONAL HISTORY OF OTHER DRUG THERAPY: ICD-10-CM

## 2019-09-16 DIAGNOSIS — R23.0 CYANOSIS: ICD-10-CM

## 2019-09-16 DIAGNOSIS — Z23 ENCOUNTER FOR IMMUNIZATION: ICD-10-CM

## 2019-09-16 DIAGNOSIS — B35.1 TINEA UNGUIUM: ICD-10-CM

## 2019-09-16 DIAGNOSIS — Z87.09 PERSONAL HISTORY OF OTHER DISEASES OF THE RESPIRATORY SYSTEM: ICD-10-CM

## 2019-09-16 DIAGNOSIS — Z87.19 PERSONAL HISTORY OF OTHER DISEASES OF THE DIGESTIVE SYSTEM: ICD-10-CM

## 2019-09-16 DIAGNOSIS — Z87.898 PERSONAL HISTORY OF OTHER SPECIFIED CONDITIONS: ICD-10-CM

## 2019-09-16 DIAGNOSIS — Z87.2 PERSONAL HISTORY OF DISEASES OF THE SKIN AND SUBCUTANEOUS TISSUE: ICD-10-CM

## 2019-09-16 DIAGNOSIS — Z87.440 PERSONAL HISTORY OF URINARY (TRACT) INFECTIONS: ICD-10-CM

## 2019-09-16 DIAGNOSIS — M79.606 PAIN IN LEG, UNSPECIFIED: ICD-10-CM

## 2019-09-16 DIAGNOSIS — E11.8 TYPE 2 DIABETES MELLITUS WITH UNSPECIFIED COMPLICATIONS: ICD-10-CM

## 2019-09-16 PROCEDURE — 99214 OFFICE O/P EST MOD 30 MIN: CPT | Mod: 25

## 2019-09-16 PROCEDURE — G0008: CPT

## 2019-09-16 PROCEDURE — 90653 IIV ADJUVANT VACCINE IM: CPT

## 2019-09-16 RX ORDER — BENZONATATE 100 MG/1
100 CAPSULE ORAL 3 TIMES DAILY
Qty: 30 | Refills: 0 | Status: DISCONTINUED | COMMUNITY
Start: 2019-07-10 | End: 2019-09-16

## 2019-09-17 NOTE — HISTORY OF PRESENT ILLNESS
[FreeTextEntry1] : dementia, [Check glucose ___ x/day] : Patient checks  blood glucose [unfilled]  times a day [No episodes] : No hypoglycemic episodes since the last visit. [Review glucose log over ___ months] : Glucose logs reviewed over the past [unfilled] months reveal: [Regular podiatrist visits] : Patient had regular podiatrist visits [] : always in range [Fasting:  ___] : Fasting Blood Sugar: [unfilled] mg/dL [No Retinopathy] : No retinopathy [Understanding of foot care] : Patient expressed understanding of foot care [Most Recent A1C: ___] : Most recent A1C was [unfilled] [Target A1C:  ___] : Target A1C is [unfilled] [Target goal met] : A1C target goal met [No therapy] : Patient is not on statin therapy [EyeExamDate] : MM/2019 [de-identified] : Here for f/u hyperlipidemia,  dementia. patient was evaluated by urology dr. Shultz 09/19, scheduled for cystoscopy . CT pelvis showed cystitis in 08/2019. She is requesting flu vaccine. Here with aide and daughter. Per daughter pt. does not have COPD but does have stable asthma. She denies skin rash or ulcer.

## 2019-09-17 NOTE — PLAN
[FreeTextEntry1] : labs ordered. continue current meds.\par  f/u vascular re PVD. Continue diabetic diet.

## 2019-09-17 NOTE — PHYSICAL EXAM
[No Acute Distress] : no acute distress [Well Nourished] : well nourished [Well-Appearing] : well-appearing [Well Developed] : well developed [Normal Sclera/Conjunctiva] : normal sclera/conjunctiva [PERRL] : pupils equal round and reactive to light [EOMI] : extraocular movements intact [Normal Outer Ear/Nose] : the outer ears and nose were normal in appearance [Normal Oropharynx] : the oropharynx was normal [No JVD] : no jugular venous distention [No Lymphadenopathy] : no lymphadenopathy [Supple] : supple [Thyroid Normal, No Nodules] : the thyroid was normal and there were no nodules present [No Accessory Muscle Use] : no accessory muscle use [No Respiratory Distress] : no respiratory distress  [Clear to Auscultation] : lungs were clear to auscultation bilaterally [Normal Rate] : normal rate  [Normal S1, S2] : normal S1 and S2 [Regular Rhythm] : with a regular rhythm [No Murmur] : no murmur heard [No Carotid Bruits] : no carotid bruits [No Abdominal Bruit] : a ~M bruit was not heard ~T in the abdomen [No Varicosities] : no varicosities [No Edema] : there was no peripheral edema [Pedal Pulses Present] : the pedal pulses are present [No Extremity Clubbing/Cyanosis] : no extremity clubbing/cyanosis [No Palpable Aorta] : no palpable aorta [Non Tender] : non-tender [Soft] : abdomen soft [No Masses] : no abdominal mass palpated [Non-distended] : non-distended [No HSM] : no HSM [Normal Bowel Sounds] : normal bowel sounds [Normal Anterior Cervical Nodes] : no anterior cervical lymphadenopathy [Normal Posterior Cervical Nodes] : no posterior cervical lymphadenopathy [No Spinal Tenderness] : no spinal tenderness [No CVA Tenderness] : no CVA  tenderness [No Joint Swelling] : no joint swelling [No Rash] : no rash [Grossly Normal Strength/Tone] : grossly normal strength/tone [No Focal Deficits] : no focal deficits [Deep Tendon Reflexes (DTR)] : deep tendon reflexes were 2+ and symmetric [Normal Insight/Judgement] : insight and judgment were intact [Normal Affect] : the affect was normal [de-identified] : wheel chair bound

## 2019-09-17 NOTE — HEALTH RISK ASSESSMENT
[No] : In the past 12 months have you used drugs other than those required for medical reasons? No [No falls in past year] : Patient reported no falls in the past year [0] : 2) Feeling down, depressed, or hopeless: Not at all (0) [de-identified] : wheel chair bound [] : No [de-identified] :  diabetic [[id number]] : 0

## 2019-09-17 NOTE — COUNSELING
[Behavioral health counseling provided] : Behavioral health counseling provided [Engage in a relaxing activity] : Engage in a relaxing activity [None] : None [Needs reinforcement, provided] : Patient needs reinforcement on understanding of lifestyle changes and steps needed to achieve self management goal; reinforcement was provided [Fall prevention counseling provided] : Fall prevention counseling provided [Adequate lighting] : Adequate lighting [Use proper foot wear] : Use proper foot wear [No throw rugs] : No throw rugs

## 2019-10-07 ENCOUNTER — RX RENEWAL (OUTPATIENT)
Age: 84
End: 2019-10-07

## 2019-10-14 ENCOUNTER — RX RENEWAL (OUTPATIENT)
Age: 84
End: 2019-10-14

## 2019-12-10 ENCOUNTER — APPOINTMENT (OUTPATIENT)
Dept: FAMILY MEDICINE | Facility: CLINIC | Age: 84
End: 2019-12-10
Payer: MEDICARE

## 2019-12-10 ENCOUNTER — RX RENEWAL (OUTPATIENT)
Age: 84
End: 2019-12-10

## 2019-12-10 VITALS
HEART RATE: 76 BPM | DIASTOLIC BLOOD PRESSURE: 52 MMHG | TEMPERATURE: 98.6 F | OXYGEN SATURATION: 97 % | SYSTOLIC BLOOD PRESSURE: 92 MMHG

## 2019-12-10 PROCEDURE — 99214 OFFICE O/P EST MOD 30 MIN: CPT

## 2019-12-15 NOTE — PHYSICAL EXAM
[No Acute Distress] : no acute distress [Well Developed] : well developed [Well Nourished] : well nourished [Well-Appearing] : well-appearing [Normal Sclera/Conjunctiva] : normal sclera/conjunctiva [EOMI] : extraocular movements intact [PERRL] : pupils equal round and reactive to light [Normal Outer Ear/Nose] : the outer ears and nose were normal in appearance [Normal Oropharynx] : the oropharynx was normal [No Lymphadenopathy] : no lymphadenopathy [No JVD] : no jugular venous distention [Supple] : supple [Thyroid Normal, No Nodules] : the thyroid was normal and there were no nodules present [No Accessory Muscle Use] : no accessory muscle use [Clear to Auscultation] : lungs were clear to auscultation bilaterally [No Respiratory Distress] : no respiratory distress  [Normal S1, S2] : normal S1 and S2 [Normal Rate] : normal rate  [Regular Rhythm] : with a regular rhythm [No Murmur] : no murmur heard [No Carotid Bruits] : no carotid bruits [Pedal Pulses Present] : the pedal pulses are present [No Varicosities] : no varicosities [No Abdominal Bruit] : a ~M bruit was not heard ~T in the abdomen [No Extremity Clubbing/Cyanosis] : no extremity clubbing/cyanosis [No Edema] : there was no peripheral edema [No Palpable Aorta] : no palpable aorta [Soft] : abdomen soft [Non Tender] : non-tender [No HSM] : no HSM [No Masses] : no abdominal mass palpated [Non-distended] : non-distended [Normal Posterior Cervical Nodes] : no posterior cervical lymphadenopathy [Normal Anterior Cervical Nodes] : no anterior cervical lymphadenopathy [Normal Bowel Sounds] : normal bowel sounds [No CVA Tenderness] : no CVA  tenderness [No Joint Swelling] : no joint swelling [No Spinal Tenderness] : no spinal tenderness [Grossly Normal Strength/Tone] : grossly normal strength/tone [No Rash] : no rash [Normal Affect] : the affect was normal [Deep Tendon Reflexes (DTR)] : deep tendon reflexes were 2+ and symmetric [No Focal Deficits] : no focal deficits [Normal Insight/Judgement] : insight and judgment were intact [de-identified] : wheel chair bound

## 2019-12-15 NOTE — PLAN
[FreeTextEntry1] : BP low. monitor\par  flonase nasal spray for PND. humidifier  use. stay hydrated.\par Dementia : stable mood. \par DM-2 and PVD: stable .\par Continue current management.

## 2019-12-15 NOTE — HEALTH RISK ASSESSMENT
[] : No [No] : No [No falls in past year] : Patient reported no falls in the past year [[id number]] : 0 [0] : 2) Feeling down, depressed, or hopeless: Not at all (0)

## 2019-12-15 NOTE — COUNSELING
[None] : None [Needs reinforcement, provided] : Patient needs reinforcement on understanding of lifestyle changes and steps needed to achieve self management goal; reinforcement was provided

## 2019-12-15 NOTE — REVIEW OF SYSTEMS
[Earache] : no earache [Sore Throat] : no sore throat [Postnasal Drip] : postnasal drip [Memory Loss] : memory loss [Headache] : no headache [Dizziness] : no dizziness [Unsteady Walking] : ataxia [Negative] : Heme/Lymph

## 2019-12-15 NOTE — HISTORY OF PRESENT ILLNESS
[FreeTextEntry1] : f/u DM-2, HTN [de-identified] : Here for f/u Dementia, HTN, DM-2. Her blood sugars are stable. She is taking her meds regularly and denies chest pain , sob , fever, chills, cough. She c/o postnasal drip for few days,not using otc meds. Here with her aide.

## 2020-01-06 ENCOUNTER — APPOINTMENT (OUTPATIENT)
Age: 85
End: 2020-01-06
Payer: MEDICARE

## 2020-01-06 VITALS
HEART RATE: 75 BPM | SYSTOLIC BLOOD PRESSURE: 104 MMHG | DIASTOLIC BLOOD PRESSURE: 50 MMHG | OXYGEN SATURATION: 98 % | TEMPERATURE: 98.3 F

## 2020-01-06 PROCEDURE — 99214 OFFICE O/P EST MOD 30 MIN: CPT

## 2020-01-06 NOTE — REVIEW OF SYSTEMS
[Cough] : cough [Headache] : headache [Memory Loss] : memory loss [Unsteady Walking] : ataxia [Negative] : Heme/Lymph [Shortness Of Breath] : no shortness of breath [Wheezing] : no wheezing [Fainting] : no fainting [Dizziness] : no dizziness [Confusion] : no confusion

## 2020-01-06 NOTE — COUNSELING
[Behavioral health counseling provided] : Behavioral health counseling provided [None] : None [Engage in a relaxing activity] : Engage in a relaxing activity [Needs reinforcement, provided] : Patient needs reinforcement on understanding of lifestyle changes and steps needed to achieve self management goal; reinforcement was provided

## 2020-01-06 NOTE — HISTORY OF PRESENT ILLNESS
[___ Weeks ago] :  [unfilled] weeks ago [Moderate] : moderate [Paroxysmal] : paroxysmal [Sudden] : suddenly [Congestion] : congestion [Cough] : cough [OTC Remedies] : OTC remedies [At Night] : at night [Stable] : stable [Sore Throat] : no sore throat [Wheezing] : no wheezing [Chills] : no chills [Anorexia] : no anorexia [Shortness Of Breath] : no shortness of breath [Earache] : no earache [Fatigue] : not fatigue [Headache] : no headache [Fever] : no fever [FreeTextEntry5] : Dayquil [FreeTextEntry8] : Here for f/u after head injury 3 weeks ago from a fall due to sliding off the chair and hitting her head on Tanika lift. NO headache, confusion, nausea, vomiting, somnolence. per charisse Pickering, pt. is talking a lot at night.

## 2020-01-06 NOTE — PLAN
[FreeTextEntry1] : head injury: CT head.\par  cough: cough suppressant, monitor for worsening.\par  Dementia stable. \par CVA right sided weakness: CDPAS papers filled out. \par PVS,DM-2 stable. continue current meds.

## 2020-01-06 NOTE — HEALTH RISK ASSESSMENT
[No falls in past year] : Patient reported no falls in the past year [No] : No [0] : 2) Feeling down, depressed, or hopeless: Not at all (0) [] : No [[id number]] : 0

## 2020-01-06 NOTE — PHYSICAL EXAM
[No Acute Distress] : no acute distress [Well Developed] : well developed [Well Nourished] : well nourished [Well-Appearing] : well-appearing [PERRL] : pupils equal round and reactive to light [Normal Sclera/Conjunctiva] : normal sclera/conjunctiva [Normal Outer Ear/Nose] : the outer ears and nose were normal in appearance [EOMI] : extraocular movements intact [No JVD] : no jugular venous distention [Normal Oropharynx] : the oropharynx was normal [No Lymphadenopathy] : no lymphadenopathy [Supple] : supple [Thyroid Normal, No Nodules] : the thyroid was normal and there were no nodules present [No Accessory Muscle Use] : no accessory muscle use [No Respiratory Distress] : no respiratory distress  [Normal Rate] : normal rate  [Clear to Auscultation] : lungs were clear to auscultation bilaterally [Normal S1, S2] : normal S1 and S2 [Regular Rhythm] : with a regular rhythm [No Carotid Bruits] : no carotid bruits [No Murmur] : no murmur heard [No Abdominal Bruit] : a ~M bruit was not heard ~T in the abdomen [No Varicosities] : no varicosities [Pedal Pulses Present] : the pedal pulses are present [No Palpable Aorta] : no palpable aorta [No Edema] : there was no peripheral edema [No Extremity Clubbing/Cyanosis] : no extremity clubbing/cyanosis [Non Tender] : non-tender [Soft] : abdomen soft [Non-distended] : non-distended [No Masses] : no abdominal mass palpated [No HSM] : no HSM [Normal Posterior Cervical Nodes] : no posterior cervical lymphadenopathy [Normal Bowel Sounds] : normal bowel sounds [No CVA Tenderness] : no CVA  tenderness [No Spinal Tenderness] : no spinal tenderness [Normal Anterior Cervical Nodes] : no anterior cervical lymphadenopathy [No Joint Swelling] : no joint swelling [Grossly Normal Strength/Tone] : grossly normal strength/tone [No Rash] : no rash [Deep Tendon Reflexes (DTR)] : deep tendon reflexes were 2+ and symmetric [Normal Affect] : the affect was normal [Normal Insight/Judgement] : insight and judgment were intact [de-identified] : uses wheelchair

## 2020-02-24 ENCOUNTER — RX RENEWAL (OUTPATIENT)
Age: 85
End: 2020-02-24

## 2020-03-10 ENCOUNTER — APPOINTMENT (OUTPATIENT)
Dept: FAMILY MEDICINE | Facility: CLINIC | Age: 85
End: 2020-03-10

## 2020-05-05 ENCOUNTER — APPOINTMENT (OUTPATIENT)
Age: 85
End: 2020-05-05
Payer: MEDICARE

## 2020-05-05 VITALS — TEMPERATURE: 99 F

## 2020-05-05 PROCEDURE — 99214 OFFICE O/P EST MOD 30 MIN: CPT | Mod: 95

## 2020-05-06 ENCOUNTER — APPOINTMENT (OUTPATIENT)
Age: 85
End: 2020-05-06
Payer: MEDICARE

## 2020-05-06 PROCEDURE — 99442: CPT | Mod: CS,CR

## 2020-05-07 ENCOUNTER — APPOINTMENT (OUTPATIENT)
Age: 85
End: 2020-05-07

## 2020-05-10 NOTE — HISTORY OF PRESENT ILLNESS
[Home] : at home, [unfilled] , at the time of the visit. [Medical Office: ([facility])___] : at the medical office located in  [Self] : self [Patient] : the patient [___ Days ago] : [unfilled] days ago [Moderate] : moderate [Paroxysmal] : paroxysmal [Cough] : cough [Congestion] : no congestion [Sore Throat] : no sore throat [Wheezing] : no wheezing [Anorexia] : no anorexia [Chills] : chills [Earache] : no earache [Headache] : no headache [Shortness Of Breath] : no shortness of breath [Fatigue] : fatigue [Fever] : fever [Tmax= ___] : Tmax = [unfilled] [OTC Remedies] : OTC remedies [Worsening] : worsening [In Morning] : in the morning [At Night] : at night [FreeTextEntry8] : spoke to daughter Catherine who is also sick with sore throat and lives with mom . Aide is taking care of patient and is quarantined in the same house. [FreeTextEntry5] : Tylenol

## 2020-05-10 NOTE — PHYSICAL EXAM
[Ill-Appearing] : ill-appearing [No Acute Distress] : no acute distress [Normal Sclera/Conjunctiva] : normal sclera/conjunctiva [EOMI] : extraocular movements intact [Normal Mood] : the mood was normal [Supple] : supple [No Respiratory Distress] : no respiratory distress  [de-identified] : lying in bed

## 2020-05-10 NOTE — HEALTH RISK ASSESSMENT
[] : No [No falls in past year] : Patient reported no falls in the past year [No] : In the past 12 months have you used drugs other than those required for medical reasons? No [0] : 2) Feeling down, depressed, or hopeless: Not at all (0) [[id number]] : 0

## 2020-05-10 NOTE — COUNSELING
[Behavioral health counseling provided] : Behavioral health counseling provided [Engage in a relaxing activity] : Engage in a relaxing activity [None] : None

## 2020-05-10 NOTE — PLAN
[FreeTextEntry1] : spoke to daughter.\par  supportive care.\par  hydrate. monitor temp. \par  if persistent fever not coming down with Tylenol or SOB, go to ER.

## 2020-05-11 ENCOUNTER — APPOINTMENT (OUTPATIENT)
Age: 85
End: 2020-05-11
Payer: MEDICARE

## 2020-05-11 PROCEDURE — 99214 OFFICE O/P EST MOD 30 MIN: CPT | Mod: CS,95

## 2020-05-11 NOTE — HEALTH RISK ASSESSMENT
[No] : In the past 12 months have you used drugs other than those required for medical reasons? No [No falls in past year] : Patient reported no falls in the past year [0] : 2) Feeling down, depressed, or hopeless: Not at all (0) [HIV test declined] : HIV test declined [Hepatitis C test declined] : Hepatitis C test declined [None] : None [With Patient/Caregiver] : With Patient/Caregiver [Name: ___] : Health Care Proxy's Name: [unfilled]  [Relationship: ___] : Relationship: [unfilled] [DNR] : DNR [[id number]] : 02 [] : No [Change in mental status noted] : No change in mental status noted [Language] : denies difficulty with language [Behavior] : denies difficulty with behavior [Learning/Retaining New Information] : denies difficulty learning/retaining new information [Handling Complex Tasks] : denies difficulty handling complex tasks [Reasoning] : denies difficulty with reasoning [Spatial Ability and Orientation] : denies difficulty with spatial ability and orientation [AdvancecareDate] : 05/11/2020

## 2020-05-11 NOTE — HISTORY OF PRESENT ILLNESS
[Home] : at home, [unfilled] , at the time of the visit. [Medical Office: ([facility])___] : at the medical office located in  [Self] : self [Patient] : the patient [None] : none [Yes] : yes [de-identified] : pt. seen for f/u Covid 19 Pneumonia. She still has cough but  clear phlegm. No fever, chills, diarrhea, sore throat, ear pain, SOB. Pt. is AAOx2-3 ,nad, Lying in bed.Her symptoms began a week ago. She lives with her daughter and has personal care aide. \par Pulse Ox and temp. readings are normal. Daughter is giving her Mucinex for cough. Cxr done in [facility] showed hazy left base opacity.  [Post-hospitalization from ___ Hospital] : Post-hospitalization from [unfilled] Hospital [Admitted on: ___] : The patient was admitted on [unfilled] [Pertinent Labs] : pertinent labs [Discharged on ___] : discharged on [unfilled] [Discharge Summary] : discharge summary [Radiology Findings] : radiology findings [Discharge Med List] : discharge medication list [Med Reconciliation] : medication reconciliation has been completed [Patient Contacted By: ____] : and contacted by [unfilled] [FreeTextEntry2] : pt. seen via televideo s/o hospital discharge, f/u Covid 19 Pneumonia. She still has cough but  clear phlegm. No fever, chills, diarrhea, sore throat, ear pain, SOB. Pt. is AAOx2-3 ,nad, Lying in bed.Her symptoms began a week ago. She lives with her daughter and has personal care aide. \par Pulse Ox and temp. readings are normal. Daughter is giving her Mucinex for cough. Cxr done in [facility] showed hazy left base opacity.  [Patient discharged from recent hospitalization for COVID-19] : Patient discharged from recent hospitalization for COVID-19 [No] : none [TextBox_8] : 7 [TextBox_13] : 103 [FreeTextEntry1] : F/U COUGH

## 2020-05-11 NOTE — HEALTH RISK ASSESSMENT
[No] : In the past 12 months have you used drugs other than those required for medical reasons? No [No falls in past year] : Patient reported no falls in the past year [0] : 2) Feeling down, depressed, or hopeless: Not at all (0) [HIV test declined] : HIV test declined [Hepatitis C test declined] : Hepatitis C test declined [None] : None [Name: ___] : Health Care Proxy's Name: [unfilled]  [Relationship: ___] : Relationship: [unfilled] [With Patient/Caregiver] : With Patient/Caregiver [DNR] : DNR [[id number]] : 02 [] : No [Change in mental status noted] : No change in mental status noted [Language] : denies difficulty with language [Behavior] : denies difficulty with behavior [Learning/Retaining New Information] : denies difficulty learning/retaining new information [Handling Complex Tasks] : denies difficulty handling complex tasks [Reasoning] : denies difficulty with reasoning [Spatial Ability and Orientation] : denies difficulty with spatial ability and orientation [AdvancecareDate] : 05/11/2020

## 2020-05-11 NOTE — PHYSICAL EXAM
[No Acute Distress] : no acute distress [Well Nourished] : well nourished [Well-Appearing] : well-appearing [Well Developed] : well developed [Normal Sclera/Conjunctiva] : normal sclera/conjunctiva [EOMI] : extraocular movements intact [Normal Outer Ear/Nose] : the outer ears and nose were normal in appearance [Supple] : supple [No Respiratory Distress] : no respiratory distress  [Normal Mood] : the mood was normal

## 2020-05-11 NOTE — HISTORY OF PRESENT ILLNESS
[Home] : at home, [unfilled] , at the time of the visit. [Medical Office: ([facility])___] : at the medical office located in  [Patient] : the patient [Self] : self [None] : none [Yes] : yes [de-identified] : pt. seen for f/u Covid 19 Pneumonia. She still has cough but  clear phlegm. No fever, chills, diarrhea, sore throat, ear pain, SOB. Pt. is AAOx2-3 ,nad, Lying in bed.Her symptoms began a week ago. She lives with her daughter and has personal care aide. \par Pulse Ox and temp. readings are normal. Daughter is giving her Mucinex for cough. Cxr done in [facility] showed hazy left base opacity.  [Post-hospitalization from ___ Hospital] : Post-hospitalization from [unfilled] Hospital [Admitted on: ___] : The patient was admitted on [unfilled] [Pertinent Labs] : pertinent labs [Discharge Summary] : discharge summary [Discharged on ___] : discharged on [unfilled] [Med Reconciliation] : medication reconciliation has been completed [Discharge Med List] : discharge medication list [Radiology Findings] : radiology findings [Patient Contacted By: ____] : and contacted by [unfilled] [FreeTextEntry2] : pt. seen via televideo s/o hospital discharge, f/u Covid 19 Pneumonia. She still has cough but  clear phlegm. No fever, chills, diarrhea, sore throat, ear pain, SOB. Pt. is AAOx2-3 ,nad, Lying in bed.Her symptoms began a week ago. She lives with her daughter and has personal care aide. \par Pulse Ox and temp. readings are normal. Daughter is giving her Mucinex for cough. Cxr done in [facility] showed hazy left base opacity.  [No] : none [Patient discharged from recent hospitalization for COVID-19] : Patient discharged from recent hospitalization for COVID-19 [TextBox_8] : 7 [TextBox_13] : 103 [FreeTextEntry1] : F/U COUGH

## 2020-05-11 NOTE — PLAN
[Patient/Family counseled on discontinuation of isolation/quarantine. It can be discontinued if patient is at least 72 hrs] : Patient/Family counseled on discontinuation of isolation/quarantine. It can be discontinued if patient is at least 72 hrs without fever without fever-reducing medications AND improvement in respiratory symptoms (e.g. cough, shortness of breath) AND at least 7 days have passed since symptoms first appeared. [Patient/Family instructed to call PCP if any worsening or new symptoms] : Patient/Family instructed to call PCP if any worsening or new symptoms [FreeTextEntry1] : 05/18/2020 [82775 - High Complexity requires an extensive number of possible diagnoses and/or the management options, extensive complexity of the medical data (tests, etc.) to be reviewed, and a high risk of significant complications, morbidity, and/or mortality as w] : High Complexity

## 2020-05-11 NOTE — PHYSICAL EXAM
[No Acute Distress] : no acute distress [Well Nourished] : well nourished [Well Developed] : well developed [Well-Appearing] : well-appearing [EOMI] : extraocular movements intact [Normal Sclera/Conjunctiva] : normal sclera/conjunctiva [Supple] : supple [Normal Outer Ear/Nose] : the outer ears and nose were normal in appearance [No Respiratory Distress] : no respiratory distress  [Normal Mood] : the mood was normal

## 2020-05-11 NOTE — PLAN
[Patient/Family counseled on discontinuation of isolation/quarantine. It can be discontinued if patient is at least 72 hrs] : Patient/Family counseled on discontinuation of isolation/quarantine. It can be discontinued if patient is at least 72 hrs without fever without fever-reducing medications AND improvement in respiratory symptoms (e.g. cough, shortness of breath) AND at least 7 days have passed since symptoms first appeared. [Patient/Family instructed to call PCP if any worsening or new symptoms] : Patient/Family instructed to call PCP if any worsening or new symptoms [FreeTextEntry1] : 05/18/2020 [26293 - High Complexity requires an extensive number of possible diagnoses and/or the management options, extensive complexity of the medical data (tests, etc.) to be reviewed, and a high risk of significant complications, morbidity, and/or mortality as w] : High Complexity

## 2020-05-18 ENCOUNTER — APPOINTMENT (OUTPATIENT)
Age: 85
End: 2020-05-18
Payer: MEDICARE

## 2020-05-18 DIAGNOSIS — J12.82 COVID-19: ICD-10-CM

## 2020-05-18 DIAGNOSIS — U07.1 COVID-19: ICD-10-CM

## 2020-05-18 PROCEDURE — 99214 OFFICE O/P EST MOD 30 MIN: CPT | Mod: CS,95

## 2020-05-22 NOTE — PLAN
[FreeTextEntry1] : wet to dry dressing  with wound evaluation for sacral wound. \par  Switch to Mupirocin instead of Neosporin. \par  Turn and change position every 2 hours.\par go to ER should fever persist, low o2 sat., worsening abd. pain. \par  fiber supplement, stool softener.

## 2020-05-22 NOTE — HISTORY OF PRESENT ILLNESS
[FreeTextEntry1] : f/u cough [de-identified] : f/u Covid Pneumonia. \par spoke to daughter who is pt. representative. \par cough is less. she had abd. pain for 3 days in lower abd. she had constipation. she took Senna for constipation. no abd. pain today. no BPR. She stopped Mucinex. no fever, normal O2 sat.She is awake and alert. she has developed bed sore since hosp. discharge. NO DRAINAGE, DAUGHTER USING NEOSPORIN.

## 2020-05-22 NOTE — REVIEW OF SYSTEMS
[Shortness Of Breath] : no shortness of breath [Wheezing] : no wheezing [Cough] : cough [Abdominal Pain] : abdominal pain [Nausea] : no nausea [Constipation] : constipation [Diarrhea] : diarrhea [Vomiting] : no vomiting [Heartburn] : no heartburn [Melena] : no melena [Negative] : Heme/Lymph [de-identified] : sacral sore

## 2020-06-02 ENCOUNTER — APPOINTMENT (OUTPATIENT)
Age: 85
End: 2020-06-02
Payer: MEDICARE

## 2020-06-02 DIAGNOSIS — R10.30 LOWER ABDOMINAL PAIN, UNSPECIFIED: ICD-10-CM

## 2020-06-02 DIAGNOSIS — K59.00 CONSTIPATION, UNSPECIFIED: ICD-10-CM

## 2020-06-02 PROCEDURE — 99215 OFFICE O/P EST HI 40 MIN: CPT | Mod: CS,95

## 2020-06-02 RX ORDER — BENZONATATE 100 MG/1
100 CAPSULE ORAL 3 TIMES DAILY
Qty: 30 | Refills: 0 | Status: DISCONTINUED | COMMUNITY
Start: 2020-01-06 | End: 2020-06-02

## 2020-06-05 PROBLEM — R10.30 LOWER ABDOMINAL PAIN: Status: ACTIVE | Noted: 2020-05-18

## 2020-06-05 NOTE — HISTORY OF PRESENT ILLNESS
[FreeTextEntry1] : f/u cough [Medical Office: ([facility])___] : at the medical office located in  [de-identified] : f/u Covid Pneumonia. \par spoke to daughter who is pt. representative. \par cough is less. does not require otc meds.  no fever\par she had abd. pain for 3 weeks in lower abd. pain felt when patient is put in Tanika lift. \par she had constipation. she took Senna for constipation.no BPR. She stopped Mucinex. \par no fever, normal O2 sat.She is awake and alert. \par she has skin rash near upper back  for few days , no aggravating or relieving factors. \par she has difficulty swallowing meats and is eating more veggies in smoothies.\par She has DM2 stable and needs refill for test strips.

## 2020-06-05 NOTE — PLAN
[FreeTextEntry1] : skin abnormality-dermatology referral.\par  abdominal pain: may need abd. imaging, daughter deferred to next week. \par go to ER should fever persist, low o2 sat., worsening abd. pain. \par  fiber supplement, stool softener. \par test strips ordred, continue diabetes management, stable. \par  soft diet, swallow eval. for worsening symptoms. \par  time 40 minutes( 2.20 pm to 3 pm)

## 2020-06-05 NOTE — PHYSICAL EXAM
[No Acute Distress] : no acute distress [Well Nourished] : well nourished [Normal Sclera/Conjunctiva] : normal sclera/conjunctiva [EOMI] : extraocular movements intact [Supple] : supple [Normal Outer Ear/Nose] : the outer ears and nose were normal in appearance [No Respiratory Distress] : no respiratory distress  [Non-distended] : non-distended [de-identified] : raised skin lesions on upper back near armpit [Normal Mood] : the mood was normal

## 2020-06-05 NOTE — REVIEW OF SYSTEMS
[Shortness Of Breath] : no shortness of breath [Wheezing] : no wheezing [Cough] : cough [Abdominal Pain] : abdominal pain [Nausea] : no nausea [Constipation] : constipation [Diarrhea] : diarrhea [Melena] : no melena [Vomiting] : no vomiting [Heartburn] : no heartburn [Negative] : Psychiatric [de-identified] : sacral sore

## 2020-06-08 ENCOUNTER — RX RENEWAL (OUTPATIENT)
Age: 85
End: 2020-06-08

## 2020-06-10 NOTE — HISTORY OF PRESENT ILLNESS
Orders entered and linked to scheduled appt.   [FreeTextEntry8] : Here for concern of blush discoloration of right foot for a week with cold toes,No foot pain . She sees vascular doctor. No change in meds. She denies dizziness, chest pain ,SOB.

## 2020-07-01 ENCOUNTER — APPOINTMENT (OUTPATIENT)
Dept: FAMILY MEDICINE | Facility: CLINIC | Age: 85
End: 2020-07-01
Payer: MEDICARE

## 2020-07-01 DIAGNOSIS — J30.2 OTHER SEASONAL ALLERGIC RHINITIS: ICD-10-CM

## 2020-07-01 PROCEDURE — 99214 OFFICE O/P EST MOD 30 MIN: CPT | Mod: 95

## 2020-07-23 ENCOUNTER — APPOINTMENT (OUTPATIENT)
Dept: FAMILY MEDICINE | Facility: CLINIC | Age: 85
End: 2020-07-23
Payer: MEDICARE

## 2020-07-23 VITALS
OXYGEN SATURATION: 95 % | TEMPERATURE: 97.3 F | DIASTOLIC BLOOD PRESSURE: 58 MMHG | SYSTOLIC BLOOD PRESSURE: 94 MMHG | HEART RATE: 69 BPM

## 2020-07-23 DIAGNOSIS — S31.000A UNSPECIFIED OPEN WOUND OF LOWER BACK AND PELVIS W/OUT PENETRATION INTO RETROPERITONEUM, INITIAL ENCOUNTER: ICD-10-CM

## 2020-07-23 DIAGNOSIS — R21 RASH AND OTHER NONSPECIFIC SKIN ERUPTION: ICD-10-CM

## 2020-07-23 DIAGNOSIS — Z87.828 PERSONAL HISTORY OF OTHER (HEALED) PHYSICAL INJURY AND TRAUMA: ICD-10-CM

## 2020-07-23 DIAGNOSIS — Z87.898 PERSONAL HISTORY OF OTHER SPECIFIED CONDITIONS: ICD-10-CM

## 2020-07-23 DIAGNOSIS — R53.83 OTHER FATIGUE: ICD-10-CM

## 2020-07-23 DIAGNOSIS — Z87.2 PERSONAL HISTORY OF DISEASES OF THE SKIN AND SUBCUTANEOUS TISSUE: ICD-10-CM

## 2020-07-23 DIAGNOSIS — Z20.828 CONTACT WITH AND (SUSPECTED) EXPOSURE TO OTHER VIRAL COMMUNICABLE DISEASES: ICD-10-CM

## 2020-07-23 DIAGNOSIS — K11.7 DISTURBANCES OF SALIVARY SECRETION: ICD-10-CM

## 2020-07-23 DIAGNOSIS — R82.90 UNSPECIFIED ABNORMAL FINDINGS IN URINE: ICD-10-CM

## 2020-07-23 DIAGNOSIS — Z87.09 PERSONAL HISTORY OF OTHER DISEASES OF THE RESPIRATORY SYSTEM: ICD-10-CM

## 2020-07-23 DIAGNOSIS — R50.9 FEVER, UNSPECIFIED: ICD-10-CM

## 2020-07-23 PROCEDURE — 99215 OFFICE O/P EST HI 40 MIN: CPT

## 2020-08-05 NOTE — PHYSICAL EXAM
[No Acute Distress] : no acute distress [Normal Sclera/Conjunctiva] : normal sclera/conjunctiva [Well Nourished] : well nourished [Normal Outer Ear/Nose] : the outer ears and nose were normal in appearance [EOMI] : extraocular movements intact [Supple] : supple [No Respiratory Distress] : no respiratory distress  [Normal Mood] : the mood was normal [Non-distended] : non-distended [de-identified] : lying in bed, uses wheelchair

## 2020-08-05 NOTE — PLAN
[FreeTextEntry1] : lower Enalapril to 5 mg PO daily. \par pt. has to f/u cardiology next month. keep BP log. \par  pt. will see vascular in fall.\par  PT referral. \par  Medical certificate of survivorship filled out .\par  declined screening tests, vaccines. \par Patient needs hospital bed because she needs to position in a way not feasible with ordinary bed and she requires frequent changes in body position. \par

## 2020-08-05 NOTE — HEALTH RISK ASSESSMENT
[No falls in past year] : Patient reported no falls in the past year [No] : In the past 12 months have you used drugs other than those required for medical reasons? No [0] : 1) Little interest or pleasure doing things: Not at all (0) [] : No [[id number]] : 0

## 2020-08-05 NOTE — HISTORY OF PRESENT ILLNESS
[No episodes] : No hypoglycemic episodes since the last visit. [Fasting:  ___] : Fasting Blood Sugar: [unfilled] mg/dL [Check glucose ___ x/day] : Patient checks  blood glucose [unfilled]  times a day [] : always in range [Regular podiatrist visits] : Patient had regular podiatrist visits [Understanding of foot care] : Patient expressed understanding of foot care [No Retinopathy] : No retinopathy [Most Recent A1C: ___] : Most recent A1C was [unfilled] [Target A1C:  ___] : Target A1C is [unfilled] [Target goal met] : A1C target goal met [Moderate Intensity] : Patient is currently on moderate intensity statin  therapy [FreeTextEntry1] : Here for f/u dementia, gait abnormality. HTN, PVD.  [de-identified] : Here for f/u Dementia, diabetes.Here with DARVIN Cabezas. Her diabetes is under control. She is wheel chair bound. No cough, fever, chills, chest pain , sob . She needs medical certificate of survivorship. She needs PT script for gait abnormality.  \par She is taking her meds regularly. She needs hospital bed for adequate positioning. [EyeExamDate] : MM/18

## 2020-08-05 NOTE — HISTORY OF PRESENT ILLNESS
[Medical Office: ([facility])___] : at the medical office located in  [Home] : at home, [unfilled] , at the time of the visit. [Verbal consent obtained from patient] : the patient, [unfilled] [FreeTextEntry4] : Loly Cameron(daughter)  [FreeTextEntry1] : increased saliva, gait abnormality [de-identified] : spoke to patient representative ,daughter. patient seen lying in recliner. c/o increased saliva.  she needs hospital bed and pulse oximeter as her prior bed broke and she needs bed where her position can be changed since she has fragile skin and is mostly bed bound. Patient is put in wheelchair during the day. She reports increased saliva spitting sujatha. at night. no congestion, sob, fever, chills. Patient is afebrile, otherwise AAOX2,NAD. no abd. pain. she has chronic constipation which is managed with stool softener . Her diabetes is controlled and is takes diabetic diet.

## 2020-08-05 NOTE — PLAN
[FreeTextEntry1] : paper work fill for hospital bed and pulse oximeter completed. \par patient needs to position in a way not feasible with ordinary bed and needs frequent changes in body position. \par  patient checks blood sugar once a day.\par  use dehumidifier,  sit upright in bed, use lemon candy and Claritin for allergies. \par discussed with daughter.

## 2020-08-05 NOTE — PHYSICAL EXAM
[No Acute Distress] : no acute distress [Normal Sclera/Conjunctiva] : normal sclera/conjunctiva [Well Nourished] : well nourished [EOMI] : extraocular movements intact [Normal Outer Ear/Nose] : the outer ears and nose were normal in appearance [Supple] : supple [Normal Mood] : the mood was normal [Non-distended] : non-distended [No Respiratory Distress] : no respiratory distress  [de-identified] : lying in bed, uses wheelchair

## 2020-08-24 ENCOUNTER — RX RENEWAL (OUTPATIENT)
Age: 85
End: 2020-08-24

## 2020-08-28 ENCOUNTER — RX RENEWAL (OUTPATIENT)
Age: 85
End: 2020-08-28

## 2020-09-10 ENCOUNTER — APPOINTMENT (OUTPATIENT)
Dept: FAMILY MEDICINE | Facility: CLINIC | Age: 85
End: 2020-09-10

## 2020-09-17 ENCOUNTER — APPOINTMENT (OUTPATIENT)
Dept: FAMILY MEDICINE | Facility: CLINIC | Age: 85
End: 2020-09-17
Payer: MEDICARE

## 2020-09-17 VITALS
HEART RATE: 91 BPM | BODY MASS INDEX: 22.08 KG/M2 | OXYGEN SATURATION: 98 % | HEIGHT: 62 IN | TEMPERATURE: 97.8 F | DIASTOLIC BLOOD PRESSURE: 66 MMHG | WEIGHT: 120 LBS | SYSTOLIC BLOOD PRESSURE: 112 MMHG | RESPIRATION RATE: 15 BRPM

## 2020-09-17 DIAGNOSIS — B35.1 TINEA UNGUIUM: ICD-10-CM

## 2020-09-17 PROCEDURE — 90686 IIV4 VACC NO PRSV 0.5 ML IM: CPT

## 2020-09-17 PROCEDURE — 99214 OFFICE O/P EST MOD 30 MIN: CPT | Mod: 25

## 2020-09-17 PROCEDURE — G0008: CPT

## 2020-09-21 NOTE — PHYSICAL EXAM
[No Acute Distress] : no acute distress [Well Nourished] : well nourished [Well Developed] : well developed [Well-Appearing] : well-appearing [Normal Sclera/Conjunctiva] : normal sclera/conjunctiva [PERRL] : pupils equal round and reactive to light [EOMI] : extraocular movements intact [Normal Outer Ear/Nose] : the outer ears and nose were normal in appearance [Normal Oropharynx] : the oropharynx was normal [No JVD] : no jugular venous distention [No Lymphadenopathy] : no lymphadenopathy [Supple] : supple [Thyroid Normal, No Nodules] : the thyroid was normal and there were no nodules present [No Respiratory Distress] : no respiratory distress  [No Accessory Muscle Use] : no accessory muscle use [Clear to Auscultation] : lungs were clear to auscultation bilaterally [Normal Rate] : normal rate  [Regular Rhythm] : with a regular rhythm [Normal S1, S2] : normal S1 and S2 [No Murmur] : no murmur heard [No Carotid Bruits] : no carotid bruits [No Abdominal Bruit] : a ~M bruit was not heard ~T in the abdomen [No Varicosities] : no varicosities [Pedal Pulses Present] : the pedal pulses are present [No Edema] : there was no peripheral edema [No Palpable Aorta] : no palpable aorta [No Extremity Clubbing/Cyanosis] : no extremity clubbing/cyanosis [Soft] : abdomen soft [Non Tender] : non-tender [Non-distended] : non-distended [No Masses] : no abdominal mass palpated [No HSM] : no HSM [Normal Bowel Sounds] : normal bowel sounds [Normal Posterior Cervical Nodes] : no posterior cervical lymphadenopathy [Normal Anterior Cervical Nodes] : no anterior cervical lymphadenopathy [No CVA Tenderness] : no CVA  tenderness [No Spinal Tenderness] : no spinal tenderness [No Joint Swelling] : no joint swelling [Grossly Normal Strength/Tone] : grossly normal strength/tone [No Rash] : no rash [Coordination Grossly Intact] : coordination grossly intact [No Focal Deficits] : no focal deficits [Normal Gait] : normal gait [Normal Affect] : the affect was normal [Normal Mood] : the mood was normal [Normal Insight/Judgement] : insight and judgment were intact [de-identified] : right index finger nail discoloration  [de-identified] : lying in bed, uses wheelchair

## 2020-09-21 NOTE — HEALTH RISK ASSESSMENT
[No] : In the past 12 months have you used drugs other than those required for medical reasons? No [No falls in past year] : Patient reported no falls in the past year [0] : 2) Feeling down, depressed, or hopeless: Not at all (0) [] : No [[id number]] : 0

## 2020-09-21 NOTE — PLAN
[FreeTextEntry1] : Diabetes, ASHD stable.\par labs ordered. \par  Flu vaccine given . \par nail Laquer prescribed.\par  cough likely due to PND. phlegm is clear. monitor for worsening symptoms. If cough gets productive or SOB, get evaluated,CXR.

## 2020-09-21 NOTE — REVIEW OF SYSTEMS
[Unsteady Walking] : ataxia [Negative] : Heme/Lymph [Shortness Of Breath] : no shortness of breath [Wheezing] : no wheezing [Cough] : cough [Headache] : no headache

## 2020-09-21 NOTE — HISTORY OF PRESENT ILLNESS
[FreeTextEntry8] : c/o right index finger nail discoloration ,on and off.  she needs flu vaccine.  [No episodes] : No hypoglycemic episodes since the last visit. [Check glucose ___ x/day] : Patient checks  blood glucose [unfilled]  times a day [Fasting:  ___] : Fasting Blood Sugar: [unfilled] mg/dL [] : always in range [Regular podiatrist visits] : Patient had regular podiatrist visits [Understanding of foot care] : Patient expressed understanding of foot care [No Retinopathy] : No retinopathy [Most Recent A1C: ___] : Most recent A1C was [unfilled] [Target A1C:  ___] : Target A1C is [unfilled] [Target goal met] : A1C target goal met [Moderate Intensity] : Patient is currently on moderate intensity statin  therapy [FreeTextEntry1] : Here for f/u dementia,DM -2, HTN, ASHD. [EyeExamDate] : MM/18 [de-identified] : Here for f/u Dementia, diabetes.Here with DARVIN Cabezas. Her diabetes is under control. She is wheel chair bound. No cough, fever, chills, chest pain , sob . She is taking her meds regularly. c/o right index finger nail discoloration ,on and off.  she needs flu vaccine.  c/o chronic mild dry cough and PND. no aggravating or relieving factors. condition is table.

## 2020-10-22 ENCOUNTER — APPOINTMENT (OUTPATIENT)
Dept: FAMILY MEDICINE | Facility: CLINIC | Age: 85
End: 2020-10-22

## 2020-11-12 ENCOUNTER — APPOINTMENT (OUTPATIENT)
Dept: PULMONOLOGY | Facility: CLINIC | Age: 85
End: 2020-11-12
Payer: MEDICARE

## 2020-11-12 VITALS
SYSTOLIC BLOOD PRESSURE: 128 MMHG | TEMPERATURE: 97 F | HEIGHT: 62 IN | WEIGHT: 115 LBS | OXYGEN SATURATION: 96 % | DIASTOLIC BLOOD PRESSURE: 70 MMHG | HEART RATE: 83 BPM | BODY MASS INDEX: 21.16 KG/M2

## 2020-11-12 DIAGNOSIS — U07.1 COVID-19: ICD-10-CM

## 2020-11-12 DIAGNOSIS — K21.9 GASTRO-ESOPHAGEAL REFLUX DISEASE W/OUT ESOPHAGITIS: ICD-10-CM

## 2020-11-12 PROCEDURE — 99204 OFFICE O/P NEW MOD 45 MIN: CPT | Mod: CS

## 2020-11-12 NOTE — PHYSICAL EXAM
[No Acute Distress] : no acute distress [Normal Oropharynx] : normal oropharynx [Normal Appearance] : normal appearance [No Neck Mass] : no neck mass [Normal Rate/Rhythm] : normal rate/rhythm [Normal S1, S2] : normal s1, s2 [No Murmurs] : no murmurs [No Resp Distress] : no resp distress [Clear to Auscultation Bilaterally] : clear to auscultation bilaterally [No Abnormalities] : no abnormalities [Benign] : benign [No Clubbing] : no clubbing [No Cyanosis] : no cyanosis [No Edema] : no edema [FROM] : FROM [Normal Color/ Pigmentation] : normal color/ pigmentation [Normal Mood] : normal mood [Normal Affect] : normal affect [TextBox_99] : Nonambulatory [TextBox_132] : Right hemiparesis and dementia

## 2020-11-12 NOTE — REVIEW OF SYSTEMS
[Cough] : no cough [Negative] : Endocrine [TextBox_30] : Mild intermittent cough [TextBox_94] : Hemiparesis from stroke [TextBox_122] : History of CVA

## 2020-12-18 ENCOUNTER — RX RENEWAL (OUTPATIENT)
Age: 85
End: 2020-12-18

## 2021-01-07 ENCOUNTER — APPOINTMENT (OUTPATIENT)
Dept: FAMILY MEDICINE | Facility: CLINIC | Age: 86
End: 2021-01-07

## 2021-01-25 ENCOUNTER — APPOINTMENT (OUTPATIENT)
Dept: FAMILY MEDICINE | Facility: CLINIC | Age: 86
End: 2021-01-25
Payer: MEDICARE

## 2021-01-25 VITALS
RESPIRATION RATE: 14 BRPM | DIASTOLIC BLOOD PRESSURE: 70 MMHG | OXYGEN SATURATION: 98 % | TEMPERATURE: 98.2 F | SYSTOLIC BLOOD PRESSURE: 120 MMHG | HEIGHT: 62 IN | HEART RATE: 80 BPM | BODY MASS INDEX: 21.16 KG/M2 | WEIGHT: 115 LBS

## 2021-01-25 PROCEDURE — 99215 OFFICE O/P EST HI 40 MIN: CPT

## 2021-01-25 RX ORDER — ASPIRIN 81 MG/1
81 TABLET ORAL DAILY
Qty: 90 | Refills: 1 | Status: ACTIVE | COMMUNITY
Start: 2021-01-25 | End: 1900-01-01

## 2021-01-25 NOTE — HISTORY OF PRESENT ILLNESS
[No episodes] : No hypoglycemic episodes since the last visit. [Check glucose ___ x/day] : Patient checks  blood glucose [unfilled]  times a day [Fasting:  ___] : Fasting Blood Sugar: [unfilled] mg/dL [] : always in range [Regular podiatrist visits] : Patient had regular podiatrist visits [Understanding of foot care] : Patient expressed understanding of foot care [No Retinopathy] : No retinopathy [Most Recent A1C: ___] : Most recent A1C was [unfilled] [Target A1C:  ___] : Target A1C is [unfilled] [Target goal met] : A1C target goal met [Moderate Intensity] : Patient is currently on moderate intensity statin  therapy [FreeTextEntry1] : Here for f/u dementia, gait abnormality. HTN, PVD.  [de-identified] : Here for f/u Dementia, diabetes.Here with LadanDARVIN Bell and daughter Loly Cameron. Her diabetes is under control. She is wheel chair bound. No fever, chills, chest pain , sob . She has slight dry cough for couple days. Cupping helps, cough worse with dry heat.  \par She is taking her meds regularly. She needs hospital bed for adequate positioning due to h/o CVA, gait impairment, dementia, PVD. She is AAOX2-3, NAD.  [EyeExamDate] : MM/18

## 2021-01-25 NOTE — HEALTH RISK ASSESSMENT
[No] : In the past 12 months have you used drugs other than those required for medical reasons? No [No falls in past year] : Patient reported no falls in the past year [0] : 2) Feeling down, depressed, or hopeless: Not at all (0) [] : No [[id number]] : 0

## 2021-01-25 NOTE — PHYSICAL EXAM
[Well Nourished] : well nourished [Normal Sclera/Conjunctiva] : normal sclera/conjunctiva [EOMI] : extraocular movements intact [Normal Outer Ear/Nose] : the outer ears and nose were normal in appearance [Supple] : supple [No Respiratory Distress] : no respiratory distress  [Non-distended] : non-distended [Normal Mood] : the mood was normal [Normal Rate] : normal rate  [Regular Rhythm] : with a regular rhythm [Normal S1, S2] : normal S1 and S2 [de-identified] : murmur at sternal border  [de-identified] :  uses wheelchair

## 2021-01-25 NOTE — PLAN
[FreeTextEntry1] : HTN: Enalapril  5 mg PO daily. \par keep BP log. \par  d.m-2: stable. continue meds. \par PVD:pt. will see vascular in fall.\par declined screening tests, vaccines. \par Patient needs hospital bed because she needs to position in a way not feasible with ordinary bed and she requires frequent changes in body position. \par  Humidifier use. PT eventually for gait abnormality. \par

## 2021-01-29 ENCOUNTER — RX RENEWAL (OUTPATIENT)
Age: 86
End: 2021-01-29

## 2021-02-12 ENCOUNTER — RX RENEWAL (OUTPATIENT)
Age: 86
End: 2021-02-12

## 2021-02-25 ENCOUNTER — APPOINTMENT (OUTPATIENT)
Age: 86
End: 2021-02-25
Payer: MEDICARE

## 2021-02-25 DIAGNOSIS — R05 COUGH: ICD-10-CM

## 2021-02-25 PROCEDURE — 99213 OFFICE O/P EST LOW 20 MIN: CPT | Mod: 95

## 2021-02-25 NOTE — HISTORY OF PRESENT ILLNESS
[Home] : at home, [unfilled] , at the time of the visit. [Medical Office: ([facility])___] : at the medical office located in  [Verbal consent obtained from patient] : the patient, [unfilled] [FreeTextEntry4] : SPOKE WITH MIGUELINA Prather [Mild] : mild [___ Weeks ago] :  [unfilled] weeks ago [Gradual] : gradually [Paroxysmal] : paroxysmal [Congestion] : congestion [Cough] : no cough [Sore Throat] : no sore throat [Wheezing] : no wheezing [Chills] : no chills [Anorexia] : no anorexia [Shortness Of Breath] : no shortness of breath [Earache] : no earache [Fatigue] : not fatigue [Headache] : no headache [Fever] : no fever [OTC Remedies] : OTC remedies [At Night] : at night [In Morning] : in the morning [Stable] : stable [FreeTextEntry5] : Mucinex, cupping  [FreeTextEntry8] : pt. evaluated due to productive cough with clear phlegm. Pt. has difficulty expectorating. spOke with aide.

## 2021-02-25 NOTE — PHYSICAL EXAM
[No Acute Distress] : no acute distress [Well Nourished] : well nourished [Normal Sclera/Conjunctiva] : normal sclera/conjunctiva [EOMI] : extraocular movements intact [Normal Outer Ear/Nose] : the outer ears and nose were normal in appearance [Supple] : supple [No Respiratory Distress] : no respiratory distress  [de-identified] : wheelchair bound

## 2021-02-25 NOTE — HEALTH RISK ASSESSMENT
[] : No [No] : In the past 12 months have you used drugs other than those required for medical reasons? No [No falls in past year] : Patient reported no falls in the past year [0] : 2) Feeling down, depressed, or hopeless: Not at all (0) [[id number]] : 0

## 2021-02-25 NOTE — PLAN
[FreeTextEntry1] : chest PT.\par  cough medicine.\par  stay well hydrated.\par  stay upright, frequent position change.\par  expectorant, cupping technique.

## 2021-03-04 NOTE — REVIEW OF SYSTEMS
Detail Level: Simple Detail Level: Zone [Skin Rash] : skin rash [Headache] : no headache [Dizziness] : no dizziness [Fainting] : no fainting [Confusion] : no confusion [Memory Loss] : no memory loss [Unsteady Walking] : ataxia [Negative] : Heme/Lymph [de-identified] : bluish discoloration of right foot

## 2021-04-12 ENCOUNTER — RX RENEWAL (OUTPATIENT)
Age: 86
End: 2021-04-12

## 2021-05-24 ENCOUNTER — APPOINTMENT (OUTPATIENT)
Dept: FAMILY MEDICINE | Facility: CLINIC | Age: 86
End: 2021-05-24

## 2021-06-14 ENCOUNTER — RX RENEWAL (OUTPATIENT)
Age: 86
End: 2021-06-14

## 2021-06-25 ENCOUNTER — APPOINTMENT (OUTPATIENT)
Dept: FAMILY MEDICINE | Facility: CLINIC | Age: 86
End: 2021-06-25
Payer: MEDICARE

## 2021-06-25 VITALS
TEMPERATURE: 97.3 F | WEIGHT: 120 LBS | OXYGEN SATURATION: 99 % | SYSTOLIC BLOOD PRESSURE: 124 MMHG | RESPIRATION RATE: 14 BRPM | BODY MASS INDEX: 22.08 KG/M2 | DIASTOLIC BLOOD PRESSURE: 60 MMHG | HEIGHT: 62 IN | HEART RATE: 90 BPM

## 2021-06-25 PROCEDURE — 99214 OFFICE O/P EST MOD 30 MIN: CPT

## 2021-06-25 RX ORDER — GUAIFENESIN 400 MG/1
400 TABLET ORAL TWICE DAILY
Qty: 20 | Refills: 0 | Status: DISCONTINUED | COMMUNITY
Start: 2021-02-25 | End: 2021-06-25

## 2021-06-25 NOTE — HEALTH RISK ASSESSMENT
[] : No [No] : In the past 12 months have you used drugs other than those required for medical reasons? No [No falls in past year] : Patient reported no falls in the past year [0] : 2) Feeling down, depressed, or hopeless: Not at all (0) [[id number]] : 0 [With Patient/Caregiver] : With Patient/Caregiver [AdvancecareDate] : 06/21

## 2021-06-25 NOTE — PHYSICAL EXAM
[Well Nourished] : well nourished [Normal Sclera/Conjunctiva] : normal sclera/conjunctiva [EOMI] : extraocular movements intact [Normal Outer Ear/Nose] : the outer ears and nose were normal in appearance [Supple] : supple [No Respiratory Distress] : no respiratory distress  [Normal Rate] : normal rate  [Regular Rhythm] : with a regular rhythm [Normal S1, S2] : normal S1 and S2 [No Varicosities] : no varicosities [No Edema] : there was no peripheral edema [Soft] : abdomen soft [Non Tender] : non-tender [Non-distended] : non-distended [Normal Bowel Sounds] : normal bowel sounds [Normal Posterior Cervical Nodes] : no posterior cervical lymphadenopathy [Normal Anterior Cervical Nodes] : no anterior cervical lymphadenopathy [Normal Mood] : the mood was normal [de-identified] : murmur at sternal border  [de-identified] : urine incontinence, wears diapers [de-identified] :  uses wheelchair

## 2021-06-25 NOTE — REVIEW OF SYSTEMS
[Dysuria] : no dysuria [Incontinence] : incontinence [Nocturia] : no nocturia [Poor Libido] : libido not poor [Hematuria] : no hematuria [Frequency] : no frequency [Vaginal Discharge] : no vaginal discharge [Headache] : no headache [Dysmenorrhea] : no dysmenorrhea [Unsteady Walking] : ataxia [Negative] : Heme/Lymph [de-identified] : wheel chair dependent

## 2021-06-25 NOTE — HISTORY OF PRESENT ILLNESS
[FreeTextEntry1] : Here for f/u dementia, gait abnormality. HTN, PVD.  [de-identified] : Here for f/u Dementia, diabetes.Here with Ladan Prather ,DARVIN and daughter Loly Cameron. Her diabetes is under control. She is wheel chair bound. No fever, chills, chest pain , sob . HHA does Cupping  to help when patient has occasional cough. \par She is taking her meds regularly. She needs  PureWick female catheter system  due to Urine Incontinence .She has  past h/o  CVA, gait impairment, dementia, PVD. She is AAOX2-3, NAD.  [No episodes] : No hypoglycemic episodes since the last visit. [Check glucose ___ x/day] : Patient checks  blood glucose [unfilled]  times a day [Fasting:  ___] : Fasting Blood Sugar: [unfilled] mg/dL [Regular podiatrist visits] : Patient had regular podiatrist visits [] : always in range [Understanding of foot care] : Patient expressed understanding of foot care [No Retinopathy] : No retinopathy [Most Recent A1C: ___] : Most recent A1C was [unfilled] [Target A1C:  ___] : Target A1C is [unfilled] [Target goal met] : A1C target goal met [Moderate Intensity] : Patient is currently on moderate intensity statin  therapy

## 2021-06-25 NOTE — PLAN
[FreeTextEntry1] : HTN: Enalapril  5 mg PO daily. \par keep BP log. check labs \par DM-2: stable. continue meds. \par PVD:pt. will see vascular in fall.\par  Cardiac murmur- f/u cardiology. \par declined screening tests, vaccines. \par Patient needs Purewick Urine collection system, female external catheter and urine collection canister with tubing  due to Urine Incontinence. Patient wears diapers requiring frequent changing during night to prevent skin irritation and urine infections. Patient has history of UTI's in past . It is medically necessary for her to use PureWick  Female external Catheter to prevent UTI's and contact dermatitis or diaper rashes. Duration of use: Lifetime. Change External catheter one time per day. form completed.\par

## 2021-06-30 LAB
25(OH)D3 SERPL-MCNC: 27.4 NG/ML
ALBUMIN SERPL ELPH-MCNC: 3.8 G/DL
ALP BLD-CCNC: 67 U/L
ALT SERPL-CCNC: 9 U/L
ANION GAP SERPL CALC-SCNC: 13 MMOL/L
AST SERPL-CCNC: 17 U/L
BASOPHILS # BLD AUTO: 0.05 K/UL
BASOPHILS NFR BLD AUTO: 0.7 %
BILIRUB SERPL-MCNC: 0.4 MG/DL
BUN SERPL-MCNC: 12 MG/DL
CALCIUM SERPL-MCNC: 8.8 MG/DL
CHLORIDE SERPL-SCNC: 102 MMOL/L
CHOLEST SERPL-MCNC: 130 MG/DL
CO2 SERPL-SCNC: 21 MMOL/L
CREAT SERPL-MCNC: 0.9 MG/DL
EOSINOPHIL # BLD AUTO: 0.2 K/UL
EOSINOPHIL NFR BLD AUTO: 2.8 %
ESTIMATED AVERAGE GLUCOSE: 146 MG/DL
GLUCOSE SERPL-MCNC: 134 MG/DL
HBA1C MFR BLD HPLC: 6.7 %
HCT VFR BLD CALC: 40.9 %
HDLC SERPL-MCNC: 51 MG/DL
HGB BLD-MCNC: 12.9 G/DL
IMM GRANULOCYTES NFR BLD AUTO: 0.1 %
LDLC SERPL CALC-MCNC: 52 MG/DL
LYMPHOCYTES # BLD AUTO: 2.44 K/UL
LYMPHOCYTES NFR BLD AUTO: 33.9 %
MAN DIFF?: NORMAL
MCHC RBC-ENTMCNC: 29.9 PG
MCHC RBC-ENTMCNC: 31.5 GM/DL
MCV RBC AUTO: 94.7 FL
MONOCYTES # BLD AUTO: 0.59 K/UL
MONOCYTES NFR BLD AUTO: 8.2 %
NEUTROPHILS # BLD AUTO: 3.91 K/UL
NEUTROPHILS NFR BLD AUTO: 54.3 %
NONHDLC SERPL-MCNC: 79 MG/DL
PLATELET # BLD AUTO: 258 K/UL
POTASSIUM SERPL-SCNC: 4.2 MMOL/L
PROT SERPL-MCNC: 6.6 G/DL
RBC # BLD: 4.32 M/UL
RBC # FLD: 13 %
SODIUM SERPL-SCNC: 136 MMOL/L
TRIGL SERPL-MCNC: 135 MG/DL
TSH SERPL-ACNC: 1.72 UIU/ML
WBC # FLD AUTO: 7.2 K/UL

## 2021-07-06 ENCOUNTER — RX RENEWAL (OUTPATIENT)
Age: 86
End: 2021-07-06

## 2021-07-14 ENCOUNTER — RX RENEWAL (OUTPATIENT)
Age: 86
End: 2021-07-14

## 2021-08-07 ENCOUNTER — TRANSCRIPTION ENCOUNTER (OUTPATIENT)
Age: 86
End: 2021-08-07

## 2021-08-07 ENCOUNTER — INPATIENT (INPATIENT)
Facility: HOSPITAL | Age: 86
LOS: 3 days | Discharge: HOME HEALTH | End: 2021-08-11
Attending: INTERNAL MEDICINE | Admitting: INTERNAL MEDICINE
Payer: MEDICARE

## 2021-08-07 VITALS
HEIGHT: 59 IN | TEMPERATURE: 97 F | RESPIRATION RATE: 16 BRPM | SYSTOLIC BLOOD PRESSURE: 127 MMHG | HEART RATE: 88 BPM | DIASTOLIC BLOOD PRESSURE: 80 MMHG | WEIGHT: 119.93 LBS

## 2021-08-07 DIAGNOSIS — Z98.89 OTHER SPECIFIED POSTPROCEDURAL STATES: Chronic | ICD-10-CM

## 2021-08-07 LAB
ALBUMIN SERPL ELPH-MCNC: 3.1 G/DL — LOW (ref 3.3–5)
ALP SERPL-CCNC: 74 U/L — SIGNIFICANT CHANGE UP (ref 40–120)
ALT FLD-CCNC: 16 U/L — SIGNIFICANT CHANGE UP (ref 12–78)
ANION GAP SERPL CALC-SCNC: 11 MMOL/L — SIGNIFICANT CHANGE UP (ref 5–17)
APTT BLD: 32.1 SEC — SIGNIFICANT CHANGE UP (ref 27.5–35.5)
AST SERPL-CCNC: 16 U/L — SIGNIFICANT CHANGE UP (ref 15–37)
BASOPHILS # BLD AUTO: 0.08 K/UL — SIGNIFICANT CHANGE UP (ref 0–0.2)
BASOPHILS NFR BLD AUTO: 0.8 % — SIGNIFICANT CHANGE UP (ref 0–2)
BILIRUB SERPL-MCNC: 0.5 MG/DL — SIGNIFICANT CHANGE UP (ref 0.2–1.2)
BUN SERPL-MCNC: 13 MG/DL — SIGNIFICANT CHANGE UP (ref 7–23)
CALCIUM SERPL-MCNC: 8.4 MG/DL — LOW (ref 8.5–10.1)
CHLORIDE SERPL-SCNC: 101 MMOL/L — SIGNIFICANT CHANGE UP (ref 96–108)
CO2 SERPL-SCNC: 23 MMOL/L — SIGNIFICANT CHANGE UP (ref 22–31)
CREAT SERPL-MCNC: 1.13 MG/DL — SIGNIFICANT CHANGE UP (ref 0.5–1.3)
EOSINOPHIL # BLD AUTO: 0.16 K/UL — SIGNIFICANT CHANGE UP (ref 0–0.5)
EOSINOPHIL NFR BLD AUTO: 1.5 % — SIGNIFICANT CHANGE UP (ref 0–6)
FLUAV AG NPH QL: SIGNIFICANT CHANGE UP
FLUBV AG NPH QL: SIGNIFICANT CHANGE UP
GLUCOSE BLDC GLUCOMTR-MCNC: 130 MG/DL — HIGH (ref 70–99)
GLUCOSE BLDC GLUCOMTR-MCNC: 143 MG/DL — HIGH (ref 70–99)
GLUCOSE BLDC GLUCOMTR-MCNC: 157 MG/DL — HIGH (ref 70–99)
GLUCOSE SERPL-MCNC: 150 MG/DL — HIGH (ref 70–99)
HCT VFR BLD CALC: 43.1 % — SIGNIFICANT CHANGE UP (ref 34.5–45)
HGB BLD-MCNC: 14.1 G/DL — SIGNIFICANT CHANGE UP (ref 11.5–15.5)
IMM GRANULOCYTES NFR BLD AUTO: 0.4 % — SIGNIFICANT CHANGE UP (ref 0–1.5)
INR BLD: 0.93 RATIO — SIGNIFICANT CHANGE UP (ref 0.88–1.16)
LYMPHOCYTES # BLD AUTO: 2.83 K/UL — SIGNIFICANT CHANGE UP (ref 1–3.3)
LYMPHOCYTES # BLD AUTO: 27 % — SIGNIFICANT CHANGE UP (ref 13–44)
MCHC RBC-ENTMCNC: 29.9 PG — SIGNIFICANT CHANGE UP (ref 27–34)
MCHC RBC-ENTMCNC: 32.7 GM/DL — SIGNIFICANT CHANGE UP (ref 32–36)
MCV RBC AUTO: 91.3 FL — SIGNIFICANT CHANGE UP (ref 80–100)
MONOCYTES # BLD AUTO: 0.88 K/UL — SIGNIFICANT CHANGE UP (ref 0–0.9)
MONOCYTES NFR BLD AUTO: 8.4 % — SIGNIFICANT CHANGE UP (ref 2–14)
NEUTROPHILS # BLD AUTO: 6.49 K/UL — SIGNIFICANT CHANGE UP (ref 1.8–7.4)
NEUTROPHILS NFR BLD AUTO: 61.9 % — SIGNIFICANT CHANGE UP (ref 43–77)
NRBC # BLD: 0 /100 WBCS — SIGNIFICANT CHANGE UP (ref 0–0)
PLATELET # BLD AUTO: 271 K/UL — SIGNIFICANT CHANGE UP (ref 150–400)
POTASSIUM SERPL-MCNC: 4.3 MMOL/L — SIGNIFICANT CHANGE UP (ref 3.5–5.3)
POTASSIUM SERPL-SCNC: 4.3 MMOL/L — SIGNIFICANT CHANGE UP (ref 3.5–5.3)
PROT SERPL-MCNC: 7.2 GM/DL — SIGNIFICANT CHANGE UP (ref 6–8.3)
PROTHROM AB SERPL-ACNC: 10.8 SEC — SIGNIFICANT CHANGE UP (ref 10.6–13.6)
RBC # BLD: 4.72 M/UL — SIGNIFICANT CHANGE UP (ref 3.8–5.2)
RBC # FLD: 13 % — SIGNIFICANT CHANGE UP (ref 10.3–14.5)
SARS-COV-2 RNA SPEC QL NAA+PROBE: SIGNIFICANT CHANGE UP
SODIUM SERPL-SCNC: 135 MMOL/L — SIGNIFICANT CHANGE UP (ref 135–145)
TROPONIN I SERPL-MCNC: <.015 NG/ML — SIGNIFICANT CHANGE UP (ref 0.01–0.04)
WBC # BLD: 10.48 K/UL — SIGNIFICANT CHANGE UP (ref 3.8–10.5)
WBC # FLD AUTO: 10.48 K/UL — SIGNIFICANT CHANGE UP (ref 3.8–10.5)

## 2021-08-07 PROCEDURE — 93010 ELECTROCARDIOGRAM REPORT: CPT

## 2021-08-07 PROCEDURE — 70498 CT ANGIOGRAPHY NECK: CPT | Mod: 26,MA

## 2021-08-07 PROCEDURE — 71045 X-RAY EXAM CHEST 1 VIEW: CPT | Mod: 26

## 2021-08-07 PROCEDURE — 99222 1ST HOSP IP/OBS MODERATE 55: CPT

## 2021-08-07 PROCEDURE — 0042T: CPT

## 2021-08-07 PROCEDURE — 99285 EMERGENCY DEPT VISIT HI MDM: CPT

## 2021-08-07 PROCEDURE — 70496 CT ANGIOGRAPHY HEAD: CPT | Mod: 26,MA

## 2021-08-07 RX ORDER — DEXTROSE 50 % IN WATER 50 %
15 SYRINGE (ML) INTRAVENOUS ONCE
Refills: 0 | Status: DISCONTINUED | OUTPATIENT
Start: 2021-08-07 | End: 2021-08-11

## 2021-08-07 RX ORDER — ASPIRIN/CALCIUM CARB/MAGNESIUM 324 MG
81 TABLET ORAL DAILY
Refills: 0 | Status: DISCONTINUED | OUTPATIENT
Start: 2021-08-07 | End: 2021-08-07

## 2021-08-07 RX ORDER — SODIUM CHLORIDE 9 MG/ML
1000 INJECTION, SOLUTION INTRAVENOUS
Refills: 0 | Status: DISCONTINUED | OUTPATIENT
Start: 2021-08-07 | End: 2021-08-07

## 2021-08-07 RX ORDER — DEXTROSE 50 % IN WATER 50 %
25 SYRINGE (ML) INTRAVENOUS ONCE
Refills: 0 | Status: DISCONTINUED | OUTPATIENT
Start: 2021-08-07 | End: 2021-08-11

## 2021-08-07 RX ORDER — INSULIN LISPRO 100/ML
VIAL (ML) SUBCUTANEOUS
Refills: 0 | Status: DISCONTINUED | OUTPATIENT
Start: 2021-08-07 | End: 2021-08-08

## 2021-08-07 RX ORDER — ASPIRIN/CALCIUM CARB/MAGNESIUM 324 MG
300 TABLET ORAL DAILY
Refills: 0 | Status: DISCONTINUED | OUTPATIENT
Start: 2021-08-07 | End: 2021-08-09

## 2021-08-07 RX ORDER — SODIUM CHLORIDE 9 MG/ML
1000 INJECTION, SOLUTION INTRAVENOUS
Refills: 0 | Status: DISCONTINUED | OUTPATIENT
Start: 2021-08-07 | End: 2021-08-10

## 2021-08-07 RX ORDER — DEXTROSE 50 % IN WATER 50 %
12.5 SYRINGE (ML) INTRAVENOUS ONCE
Refills: 0 | Status: DISCONTINUED | OUTPATIENT
Start: 2021-08-07 | End: 2021-08-11

## 2021-08-07 RX ORDER — GLUCAGON INJECTION, SOLUTION 0.5 MG/.1ML
1 INJECTION, SOLUTION SUBCUTANEOUS ONCE
Refills: 0 | Status: DISCONTINUED | OUTPATIENT
Start: 2021-08-07 | End: 2021-08-11

## 2021-08-07 RX ORDER — SODIUM CHLORIDE 9 MG/ML
1000 INJECTION, SOLUTION INTRAVENOUS
Refills: 0 | Status: DISCONTINUED | OUTPATIENT
Start: 2021-08-07 | End: 2021-08-11

## 2021-08-07 RX ORDER — ALBUTEROL 90 UG/1
0 AEROSOL, METERED ORAL
Qty: 0 | Refills: 0 | DISCHARGE

## 2021-08-07 RX ORDER — HYDRALAZINE HCL 50 MG
10 TABLET ORAL
Refills: 0 | Status: DISCONTINUED | OUTPATIENT
Start: 2021-08-07 | End: 2021-08-09

## 2021-08-07 RX ORDER — HEPARIN SODIUM 5000 [USP'U]/ML
5000 INJECTION INTRAVENOUS; SUBCUTANEOUS EVERY 12 HOURS
Refills: 0 | Status: DISCONTINUED | OUTPATIENT
Start: 2021-08-07 | End: 2021-08-11

## 2021-08-07 RX ORDER — ATORVASTATIN CALCIUM 80 MG/1
80 TABLET, FILM COATED ORAL AT BEDTIME
Refills: 0 | Status: DISCONTINUED | OUTPATIENT
Start: 2021-08-07 | End: 2021-08-11

## 2021-08-07 RX ORDER — METOPROLOL TARTRATE 50 MG
50 TABLET ORAL DAILY
Refills: 0 | Status: DISCONTINUED | OUTPATIENT
Start: 2021-08-07 | End: 2021-08-07

## 2021-08-07 RX ADMIN — HEPARIN SODIUM 5000 UNIT(S): 5000 INJECTION INTRAVENOUS; SUBCUTANEOUS at 17:40

## 2021-08-07 RX ADMIN — SODIUM CHLORIDE 60 MILLILITER(S): 9 INJECTION, SOLUTION INTRAVENOUS at 16:30

## 2021-08-07 RX ADMIN — SODIUM CHLORIDE 60 MILLILITER(S): 9 INJECTION, SOLUTION INTRAVENOUS at 20:00

## 2021-08-07 NOTE — H&P ADULT - NSHPLABSRESULTS_GEN_ALL_CORE
LABS:                        14.1   10.48 )-----------( 271      ( 07 Aug 2021 10:54 )             43.1     08-07    135  |  101  |  13  ----------------------------<  150<H>  4.3   |  23  |  1.13    Ca    8.4<L>      07 Aug 2021 10:54    TPro  7.2  /  Alb  3.1<L>  /  TBili  0.5  /  DBili  x   /  AST  16  /  ALT  16  /  AlkPhos  74  08-07    PT/INR - ( 07 Aug 2021 10:54 )   PT: 10.8 sec;   INR: 0.93 ratio         PTT - ( 07 Aug 2021 10:54 )  PTT:32.1 sec  CARDIAC MARKERS ( 07 Aug 2021 10:54 )  <.015 ng/mL / x     / x     / x     / x

## 2021-08-07 NOTE — ED ADULT TRIAGE NOTE - CHIEF COMPLAINT QUOTE
brought by daughter for left side weakness  and facial drooling. history of stroke, HTN, dM and paraplegic

## 2021-08-07 NOTE — ED PROVIDER NOTE - EKG ADDITIONAL QUESTION - PERFORMED INDEPENDENT VISUALIZATION
this is a 60 yo female presents to ed for evaluation. patient states that she had some pressure to her chest for past 6 days. patient went to a routine gyn appointment and they found her BP to be high.   patient denies any headache or visual changes . this is a 60 yo female presents to ed for evaluation. patient states that she had some pressure to her chest for past 6 days. patient went to a routine gyn appointment and they found her BP to be high.   patient denies any headache or visual changes . patient denied chest pain today. Yes

## 2021-08-07 NOTE — ED PROVIDER NOTE - OBJECTIVE STATEMENT
here for left arm weakness today    pt has residual weakness on right side and B LE due to prior stroke.    pt takes ASA daily

## 2021-08-07 NOTE — H&P ADULT - HISTORY OF PRESENT ILLNESS
85 yr       pt with h/o   mlple  cva's.   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

## 2021-08-07 NOTE — H&P ADULT - ASSESSMENT
83F       w/ multiple CVA's, , be d bound,   with  R>L paresis,on ASA 81,  braces  on  lower  limbs        DM, HTN    HLD,        , PAD s/p R AT artery angioplasty w/ SFA/pop occlusion without intervention,   h/o  R foot pain and discoloration  and has   chronic ischemia., pe r vascular at [facility]/dr phillips          h/o  dementia,  h/o   sun-downing,        presented with   c/o left arm weakness   at 6:00 am           LKW-8:00 pm(established from the daughter            CTA h/n-severe b/l cavernous carotid athero+, right A2 focal occlusion, mid-basilar focal high-grade stenosis or occlusion, 2 mm RMCA aneurysm, 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            sub acute to chronic  hyalocapsular  infarcts.  c/c  left frontal lobe infarct with severe microvascular  changes             Due to diffuse severe atherosclerotic disease and poor baseline, she is not candidate for thrombectomy , per  tele  neuro       neuro dr sewell  called by er    on asa/  lipitor  80 mg     HTN/HLD,  on enalapril, toprol  and  lipitor     h/o PAD   pt with functional quadriplegia    on   dvt    ppx/  s.q  heparin      < from: CT Brain Stroke Protocol (08.07.21 @ 10:31) >  IMPRESSION:  No acute intracranial hemorrhage, vasogenic edema, hydrocephalus or midline shift. Age-indeterminate, likely more subacute to chronic right thalamocapsular infarct. Chronic left frontal lobe infarct and severe chronic microvascular change.  I discussed the findings with Dr. Yao at 10:40 AM on 8/7/2021 with read back verification.  --- End of Report ---  < end of copied text >          83F       w/ multiple CVA's, , be d bound,   with  R>L paresis,on ASA 81,  braces  on  lower  limbs        DM, HTN    HLD,        , PAD s/p R AT artery angioplasty w/ SFA/pop occlusion without intervention,   h/o  R foot pain and discoloration  and has   chronic ischemia., pe r vascular at [facility]/dr phillips          h/o  dementia,  h/o   sun-downing,        presented with   c/o left arm weakness   at 6:00 am           LKW-8:00 pm(established from the daughter            CTA h/n-severe b/l cavernous carotid athero+, right A2 focal occlusion, mid-basilar focal high-grade stenosis or occlusion, 2 mm RMCA aneurysm, 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            sub acute to chronic  hyalocapsular  infarcts.  c/c  left frontal lobe infarct with severe microvascular  changes             Due to diffuse severe atherosclerotic disease and poor baseline, she is not candidate for thrombectomy , per  tele  neuro       neuro dr sewell  called by er    on asa/  lipitor  80 mg     HTN/HLD,  on enalapril, toprol  and  lipitor     h/o PAD   pt with functional quadriplegia    on   dvt    ppx/  s.q  heparin    failed  swallow  eval per  nurse/  npo.  iv  fluids   pt  with  advanced   dementia /  goc  to  be  defined   only   granddaughter  available  now      < from: CT Brain Stroke Protocol (08.07.21 @ 10:31) >  IMPRESSION:  No acute intracranial hemorrhage, vasogenic edema, hydrocephalus or midline shift. Age-indeterminate, likely more subacute to chronic right thalamocapsular infarct. Chronic left frontal lobe infarct and severe chronic microvascular change.  I discussed the findings with Dr. Yao at 10:40 AM on 8/7/2021 with read back verification.  --- End of Report ---  < end of copied text >

## 2021-08-07 NOTE — ED PROVIDER NOTE - PHYSICAL EXAMINATION
Yao:  General: No distress.  Mentation at baseline.   HEENT: WNL  Chest/Lungs: CTAB, No wheeze, No retractions, No increased work of breathing, Normal rate  Heart: S1S2 RRR, No M/R/G, Pules equal Bilaterally in upper and lower extremities distally  Abd: soft, NT/ND, No guarding, No rebound.  No hernias, no palpable masses.  Extrem: FROM in all joints, no significant edema noted, No ulcers.  Cap refil < 2sec.  Skin: No rash noted, warm dry.  Neuro:  Grossly normal.  No difficulty ambulating. No focal deficits.  Psychiatric: No evidence of delusions. No SI/HI.

## 2021-08-07 NOTE — H&P ADULT - NSHPPHYSICALEXAM_GEN_ALL_CORE
PHYSICAL EXAMINATION:  Vital Signs Last 24 Hrs  T(C): 36.3 (07 Aug 2021 10:09), Max: 36.3 (07 Aug 2021 10:09)  T(F): 97.3 (07 Aug 2021 10:09), Max: 97.3 (07 Aug 2021 10:09)  HR: 88 (07 Aug 2021 10:09) (88 - 88)  BP: 127/80 (07 Aug 2021 10:09) (127/80 - 127/80)  BP(mean): --  RR: 16 (07 Aug 2021 10:09) (16 - 16)  SpO2: --  CAPILLARY BLOOD GLUCOSE      POCT Blood Glucose.: 143 mg/dL (07 Aug 2021 10:09)        GENERAL: NAD, well-groomed,  HEAD:  atraumatic, normocephalic  EYES: sclera anicteric  ENMT: mucous membranes moist  NECK: supple, No JVD  CHEST/LUNG: clear to auscultation bilaterally;    no      rales   ,   no rhonchi,   HEART: normal S1, S2  ABDOMEN: BS+, soft, ND, NT   EXTREMITIES:    no    edema    b/l LEs  NEURO: awake, /  dementia  SKIN: no     rash PHYSICAL EXAMINATION:  Vital Signs Last 24 Hrs  T(C): 36.3 (07 Aug 2021 10:09), Max: 36.3 (07 Aug 2021 10:09)  T(F): 97.3 (07 Aug 2021 10:09), Max: 97.3 (07 Aug 2021 10:09)  HR: 88 (07 Aug 2021 10:09) (88 - 88)  BP: 127/80 (07 Aug 2021 10:09) (127/80 - 127/80)  BP(mean): --  RR: 16 (07 Aug 2021 10:09) (16 - 16)  SpO2: --  CAPILLARY BLOOD GLUCOSE      POCT Blood Glucose.: 143 mg/dL (07 Aug 2021 10:09)        GENERAL: NAD, well-groomed,  HEAD:  atraumatic, normocephalic  EYES: sclera anicteric  ENMT: mucous membranes moist  NECK: supple, No JVD  CHEST/LUNG: clear to auscultation bilaterally;    no      rales   ,   no rhonchi,   HEART: normal S1, S2  ABDOMEN: BS+, soft, ND, NT   EXTREMITIES:    no    edema    b/l LEs  NEURO: awake, /  dementia    dense  left hemiplegia.     right  weakness, old  SKIN: no     rash

## 2021-08-07 NOTE — ED ADULT NURSE NOTE - OBJECTIVE STATEMENT
this morning at 0800 left arm weakness noticed byb daughter. at baseline, pt bilateral LE hemiplegic and right arm weakness, confused at time. pt eat regular food at baseline. HHA 24 hours at home.

## 2021-08-08 LAB
A1C WITH ESTIMATED AVERAGE GLUCOSE RESULT: 6.1 % — HIGH (ref 4–5.6)
ANION GAP SERPL CALC-SCNC: 11 MMOL/L — SIGNIFICANT CHANGE UP (ref 5–17)
BUN SERPL-MCNC: 9 MG/DL — SIGNIFICANT CHANGE UP (ref 7–23)
CALCIUM SERPL-MCNC: 8.2 MG/DL — LOW (ref 8.5–10.1)
CHLORIDE SERPL-SCNC: 108 MMOL/L — SIGNIFICANT CHANGE UP (ref 96–108)
CO2 SERPL-SCNC: 20 MMOL/L — LOW (ref 22–31)
CREAT SERPL-MCNC: 0.89 MG/DL — SIGNIFICANT CHANGE UP (ref 0.5–1.3)
ESTIMATED AVERAGE GLUCOSE: 128 MG/DL — HIGH (ref 68–114)
GLUCOSE BLDC GLUCOMTR-MCNC: 136 MG/DL — HIGH (ref 70–99)
GLUCOSE BLDC GLUCOMTR-MCNC: 138 MG/DL — HIGH (ref 70–99)
GLUCOSE BLDC GLUCOMTR-MCNC: 178 MG/DL — HIGH (ref 70–99)
GLUCOSE SERPL-MCNC: 149 MG/DL — HIGH (ref 70–99)
POTASSIUM SERPL-MCNC: 4 MMOL/L — SIGNIFICANT CHANGE UP (ref 3.5–5.3)
POTASSIUM SERPL-SCNC: 4 MMOL/L — SIGNIFICANT CHANGE UP (ref 3.5–5.3)
SODIUM SERPL-SCNC: 139 MMOL/L — SIGNIFICANT CHANGE UP (ref 135–145)

## 2021-08-08 PROCEDURE — 70480 CT ORBIT/EAR/FOSSA W/O DYE: CPT | Mod: 26,59

## 2021-08-08 PROCEDURE — 99233 SBSQ HOSP IP/OBS HIGH 50: CPT

## 2021-08-08 PROCEDURE — 99222 1ST HOSP IP/OBS MODERATE 55: CPT

## 2021-08-08 PROCEDURE — 70450 CT HEAD/BRAIN W/O DYE: CPT | Mod: 26

## 2021-08-08 RX ORDER — INSULIN LISPRO 100/ML
VIAL (ML) SUBCUTANEOUS
Refills: 0 | Status: DISCONTINUED | OUTPATIENT
Start: 2021-08-08 | End: 2021-08-11

## 2021-08-08 RX ADMIN — HEPARIN SODIUM 5000 UNIT(S): 5000 INJECTION INTRAVENOUS; SUBCUTANEOUS at 06:17

## 2021-08-08 RX ADMIN — SODIUM CHLORIDE 60 MILLILITER(S): 9 INJECTION, SOLUTION INTRAVENOUS at 12:00

## 2021-08-08 RX ADMIN — Medication 10 MILLIGRAM(S): at 17:13

## 2021-08-08 RX ADMIN — Medication 300 MILLIGRAM(S): at 12:01

## 2021-08-08 RX ADMIN — HEPARIN SODIUM 5000 UNIT(S): 5000 INJECTION INTRAVENOUS; SUBCUTANEOUS at 17:12

## 2021-08-08 RX ADMIN — Medication 1 DROP(S): at 22:20

## 2021-08-08 RX ADMIN — Medication 10 MILLIGRAM(S): at 06:17

## 2021-08-08 NOTE — PHYSICAL THERAPY INITIAL EVALUATION ADULT - DIAGNOSIS, PT EVAL
Pt presented with strength and endurance deficits, total assist in ADLs, non ambulatory, bed and chair bound.

## 2021-08-08 NOTE — PHYSICAL THERAPY INITIAL EVALUATION ADULT - PERTINENT HX OF CURRENT PROBLEM, REHAB EVAL
Admitted with dx weakness, Pmhx/  multiple CVAs  bed and chair bound, Dementia, CT brain 8/7 with no acute intracranial hemorrhage , vasogenic edema, hydrocephalus or midline shift.

## 2021-08-08 NOTE — PHYSICAL THERAPY INITIAL EVALUATION ADULT - PLANNED THERAPY INTERVENTIONS, PT EVAL
pt is not a candidate for skilled PT intervention and will not be placed in PT program, d/c from PT effective on this date, PT spoke with .

## 2021-08-08 NOTE — PHYSICAL THERAPY INITIAL EVALUATION ADULT - GENERAL OBSERVATIONS, REHAB EVAL
Pt is awake, confused, incoherent, disoriented x 4, unable to follow directions, not in distress until UE moved with c/o pain in the shoulders.

## 2021-08-08 NOTE — PHYSICAL THERAPY INITIAL EVALUATION ADULT - RANGE OF MOTION EXAMINATION, REHAB EVAL
with muscles and joints tightness in the UE and LE with pain in the shoulders upon movement/bilateral upper extremity ROM was WFL (within functional limits)/bilateral lower extremity ROM was WFL (within functional limits)

## 2021-08-08 NOTE — PHYSICAL THERAPY INITIAL EVALUATION ADULT - ADDITIONAL COMMENTS
As per medical records pt with h/o multiple CVAs, has been chair and bed bound for few years, she is total assist in ADLs- her baseline.

## 2021-08-08 NOTE — PHYSICAL THERAPY INITIAL EVALUATION ADULT - LIVES WITH, PROFILE
Pt is poor historian, h/o dementia, unable to give information, PT called family at this number [phone] but no answer obtained.

## 2021-08-08 NOTE — PHYSICAL THERAPY INITIAL EVALUATION ADULT - PRECAUTIONS/LIMITATIONS, REHAB EVAL
bed and chair bound, presents with strength deficits, unable to follow directions, total assist in ADLS

## 2021-08-08 NOTE — PROGRESS NOTE ADULT - SUBJECTIVE AND OBJECTIVE BOX
Patient is a 85y old  Female who presents with a chief complaint of s/p  stoke  code (08 Aug 2021 07:08)      OVERNIGHT EVENTS:      REVIEW OF SYSTEMS: denies chest pain/SOB, diaphoresis, no F/C, cough, dizziness, headache, blurry vision, nausea, vomiting, abdominal pain. Rest unremarkable     MEDICATIONS  (STANDING):  aspirin Suppository 300 milliGRAM(s) Rectal daily  atorvastatin 80 milliGRAM(s) Oral at bedtime  dextrose 40% Gel 15 Gram(s) Oral once  dextrose 5% + sodium chloride 0.9%. 1000 milliLiter(s) (60 mL/Hr) IV Continuous <Continuous>  dextrose 5%. 1000 milliLiter(s) (100 mL/Hr) IV Continuous <Continuous>  dextrose 5%. 1000 milliLiter(s) (50 mL/Hr) IV Continuous <Continuous>  dextrose 50% Injectable 25 Gram(s) IV Push once  dextrose 50% Injectable 12.5 Gram(s) IV Push once  dextrose 50% Injectable 25 Gram(s) IV Push once  glucagon  Injectable 1 milliGRAM(s) IntraMuscular once  heparin   Injectable 5000 Unit(s) SubCutaneous every 12 hours  hydrALAZINE Injectable 10 milliGRAM(s) IV Push two times a day  insulin lispro (ADMELOG) corrective regimen sliding scale   SubCutaneous three times a day before meals    MEDICATIONS  (PRN):      Allergies    No Known Allergies    Intolerances        SUBJECTIVE: in bed in NAD, no acute events overnight     T(F): 97.6 (08-08-21 @ 10:26), Max: 98.8 (08-07-21 @ 17:34)  HR: 99 (08-08-21 @ 10:26) (76 - 99)  BP: 119/82 (08-08-21 @ 10:26) (116/68 - 152/84)  RR: 18 (08-08-21 @ 10:26) (18 - 18)  SpO2: 96% (08-08-21 @ 10:26) (96% - 98%)  Wt(kg): --    PHYSICAL EXAM:  GENERAL: NAD, well-groomed, well-developed, elderly,   HEAD:  Atraumatic, Normocephalic  EYES: EOMI, PERRLA, conjunctiva and sclera clear  ENMT: No tonsillar erythema, exudates, or enlargement; Moist mucous membranes, Good dentition, No lesions  NECK: Supple, No JVD, Normal thyroid  CHEST/LUNG: Clear to  auscultation bilaterally; No rales, rhonchi, wheezing, or rubs  bilaterally  HEART: Regular rate and rhythm; No murmurs, rubs, or gallops  ABDOMEN: Soft, Nontender, Nondistended; Bowel sounds present  EXTREMITIES:  2+ Peripheral Pulses, No clubbing, cyanosis, or edema BL LE    SKIN: No rashes or lesions  NERVOUS SYSTEM:  demented bed bound ; folow simple commands,   DTRs 2+ intact and symmetric, sensation intact BL    LABS:                        14.1   10.48 )-----------( 271      ( 07 Aug 2021 10:54 )             43.1     08-08    139  |  108  |  9   ----------------------------<  149<H>  4.0   |  20<L>  |  0.89    Ca    8.2<L>      08 Aug 2021 07:53    TPro  7.2  /  Alb  3.1<L>  /  TBili  0.5  /  DBili  x   /  AST  16  /  ALT  16  /  AlkPhos  74  08-07    PT/INR - ( 07 Aug 2021 10:54 )   PT: 10.8 sec;   INR: 0.93 ratio         PTT - ( 07 Aug 2021 10:54 )  PTT:32.1 sec    Cultures;   CAPILLARY BLOOD GLUCOSE      POCT Blood Glucose.: 178 mg/dL (08 Aug 2021 07:56)  POCT Blood Glucose.: 157 mg/dL (07 Aug 2021 23:27)  POCT Blood Glucose.: 130 mg/dL (07 Aug 2021 16:30)    Lipid panel:     CARDIAC MARKERS ( 07 Aug 2021 10:54 )  <.015 ng/mL / x     / x     / x     / x            RADIOLOGY & ADDITIONAL TESTS:    < from: Xray Chest 1 View- PORTABLE-Urgent (08.07.21 @ 10:51) >  Findings/  Impression: Cardiomegaly. No lung consolidations.    < end of copied text >  < from: CT Brain Stroke Protocol (08.07.21 @ 10:31) >    IMPRESSION:  No acute intracranial hemorrhage, vasogenic edema, hydrocephalus or midline shift. Age-indeterminate, likely more subacute to chronic right thalamocapsular infarct. Chronic left frontal lobe infarct and severe chronic microvascular change.      < end of copied text >  < from: CT Angio Neck w/ IV Cont (08.07.21 @ 10:46) >  IMPRESSION:  CT perfusion: Nondiagnostic due to motion artifact. No gross evidence for core infarct. If there is continual concern for acute ischemic change, consider correlation with brain MRI which is more sensitive for the detection.    CTA NECK:  Extensive streak artifact and poor opacification of the left carotid circulations limits evaluation of the left vertebral and carotid arteries throughout their course in the neck. Recommend correlation with ultrasound.  Mild stenosis by NASCET criteria proximal right internal carotid artery. No focal occlusion, hemodynamically significant stenosis or dissection in the right carotid or vertebral circulation in the neck.    CTA head:  Severe calcific atherosclerotic change of the cavernous, clinoid and supraclinoid segments of both internal carotid arteries contributing to moderate to severe stenosis.  Patent middle cerebral arteries without significant proximal stenosis or occlusion. Focal short segment occlusion distally to right anterior cerebral artery and moderate stenosis proximal A2 left anterior cerebral artery. 1.5 mm aneurysm suspected proximal M1 right middle cerebral artery.    Severe calcific atherosclerotic change of the V4 vertebral and basilar arteries with associated moderate to severe stenosis described above. No focal occlusion in the posterior vertebrobasilar circulation identified. 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    < end of copied text >    Imaging Personally Reviewed:  [ x] YES      Consultant(s) Notes Reviewed:  [x ] YES     Care Discussed with [x ] Consultants [X ] Patient [x ] Family  [x ]    [x ]  Other; RN Patient is a 85y old  Female who presents with a chief complaint of s/p  stoke  code (08 Aug 2021 07:08)      OVERNIGHT EVENTS:      REVIEW OF SYSTEMS: denies chest pain/SOB, diaphoresis, no F/C, cough, dizziness, headache, blurry vision, nausea, vomiting, abdominal pain. Rest unremarkable     MEDICATIONS  (STANDING):  aspirin Suppository 300 milliGRAM(s) Rectal daily  atorvastatin 80 milliGRAM(s) Oral at bedtime  dextrose 40% Gel 15 Gram(s) Oral once  dextrose 5% + sodium chloride 0.9%. 1000 milliLiter(s) (60 mL/Hr) IV Continuous <Continuous>  dextrose 5%. 1000 milliLiter(s) (100 mL/Hr) IV Continuous <Continuous>  dextrose 5%. 1000 milliLiter(s) (50 mL/Hr) IV Continuous <Continuous>  dextrose 50% Injectable 25 Gram(s) IV Push once  dextrose 50% Injectable 12.5 Gram(s) IV Push once  dextrose 50% Injectable 25 Gram(s) IV Push once  glucagon  Injectable 1 milliGRAM(s) IntraMuscular once  heparin   Injectable 5000 Unit(s) SubCutaneous every 12 hours  hydrALAZINE Injectable 10 milliGRAM(s) IV Push two times a day  insulin lispro (ADMELOG) corrective regimen sliding scale   SubCutaneous three times a day before meals    MEDICATIONS  (PRN):      Allergies    No Known Allergies    Intolerances        SUBJECTIVE: in bed in NAD, no acute events overnight     T(F): 97.6 (08-08-21 @ 10:26), Max: 98.8 (08-07-21 @ 17:34)  HR: 99 (08-08-21 @ 10:26) (76 - 99)  BP: 119/82 (08-08-21 @ 10:26) (116/68 - 152/84)  RR: 18 (08-08-21 @ 10:26) (18 - 18)  SpO2: 96% (08-08-21 @ 10:26) (96% - 98%)  Wt(kg): --    PHYSICAL EXAM:  GENERAL: NAD, elderly ,   HEAD:  Atraumatic, Normocephalic  EYES: EOMI, PERRLA, conjunctiva and sclera clear  ENMT: No tonsillar erythema, exudates, or enlargement; Moist mucous membranes, Good dentition, No lesions  NECK: Supple, No JVD, Normal thyroid  CHEST/LUNG: Clear to  auscultation bilaterally; No rales, rhonchi, wheezing, or rubs  bilaterally  HEART: Regular rate and rhythm; No murmurs, rubs, or gallops  ABDOMEN: Soft, Nontender, Nondistended; Bowel sounds present  EXTREMITIES:  2+ Peripheral Pulses, No clubbing, cyanosis, or edema BL LE    SKIN: No rashes or lesions  NERVOUS SYSTEM:  demented bed bound ; folow simple commands,       LABS:                        14.1   10.48 )-----------( 271      ( 07 Aug 2021 10:54 )             43.1     08-08    139  |  108  |  9   ----------------------------<  149<H>  4.0   |  20<L>  |  0.89    Ca    8.2<L>      08 Aug 2021 07:53    TPro  7.2  /  Alb  3.1<L>  /  TBili  0.5  /  DBili  x   /  AST  16  /  ALT  16  /  AlkPhos  74  08-07    PT/INR - ( 07 Aug 2021 10:54 )   PT: 10.8 sec;   INR: 0.93 ratio         PTT - ( 07 Aug 2021 10:54 )  PTT:32.1 sec    Cultures;   CAPILLARY BLOOD GLUCOSE      POCT Blood Glucose.: 178 mg/dL (08 Aug 2021 07:56)  POCT Blood Glucose.: 157 mg/dL (07 Aug 2021 23:27)  POCT Blood Glucose.: 130 mg/dL (07 Aug 2021 16:30)    Lipid panel:     CARDIAC MARKERS ( 07 Aug 2021 10:54 )  <.015 ng/mL / x     / x     / x     / x            RADIOLOGY & ADDITIONAL TESTS:    < from: Xray Chest 1 View- PORTABLE-Urgent (08.07.21 @ 10:51) >  Findings/  Impression: Cardiomegaly. No lung consolidations.    < end of copied text >  < from: CT Brain Stroke Protocol (08.07.21 @ 10:31) >    IMPRESSION:  No acute intracranial hemorrhage, vasogenic edema, hydrocephalus or midline shift. Age-indeterminate, likely more subacute to chronic right thalamocapsular infarct. Chronic left frontal lobe infarct and severe chronic microvascular change.      < end of copied text >  < from: CT Angio Neck w/ IV Cont (08.07.21 @ 10:46) >  IMPRESSION:  CT perfusion: Nondiagnostic due to motion artifact. No gross evidence for core infarct. If there is continual concern for acute ischemic change, consider correlation with brain MRI which is more sensitive for the detection.    CTA NECK:  Extensive streak artifact and poor opacification of the left carotid circulations limits evaluation of the left vertebral and carotid arteries throughout their course in the neck. Recommend correlation with ultrasound.  Mild stenosis by NASCET criteria proximal right internal carotid artery. No focal occlusion, hemodynamically significant stenosis or dissection in the right carotid or vertebral circulation in the neck.    CTA head:  Severe calcific atherosclerotic change of the cavernous, clinoid and supraclinoid segments of both internal carotid arteries contributing to moderate to severe stenosis.  Patent middle cerebral arteries without significant proximal stenosis or occlusion. Focal short segment occlusion distally to right anterior cerebral artery and moderate stenosis proximal A2 left anterior cerebral artery. 1.5 mm aneurysm suspected proximal M1 right middle cerebral artery.    Severe calcific atherosclerotic change of the V4 vertebral and basilar arteries with associated moderate to severe stenosis described above. No focal occlusion in the posterior vertebrobasilar circulation identified. 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    < end of copied text >    Imaging Personally Reviewed:  [ x] YES      Consultant(s) Notes Reviewed:  [x ] YES     Care Discussed with [x ] Consultants [X ] Patient [x ] Family  [x ]    [x ]  Other; RN Patient is a 85y old  Female who presents with a chief complaint of s/p  stoke  code (08 Aug 2021 07:08)      OVERNIGHT EVENTS:      REVIEW OF SYSTEMS: denies chest pain/SOB, diaphoresis, no F/C, cough, dizziness, headache, blurry vision, nausea, vomiting, abdominal pain. Rest unremarkable     MEDICATIONS  (STANDING):  aspirin Suppository 300 milliGRAM(s) Rectal daily  atorvastatin 80 milliGRAM(s) Oral at bedtime  dextrose 40% Gel 15 Gram(s) Oral once  dextrose 5% + sodium chloride 0.9%. 1000 milliLiter(s) (60 mL/Hr) IV Continuous <Continuous>  dextrose 5%. 1000 milliLiter(s) (100 mL/Hr) IV Continuous <Continuous>  dextrose 5%. 1000 milliLiter(s) (50 mL/Hr) IV Continuous <Continuous>  dextrose 50% Injectable 25 Gram(s) IV Push once  dextrose 50% Injectable 12.5 Gram(s) IV Push once  dextrose 50% Injectable 25 Gram(s) IV Push once  glucagon  Injectable 1 milliGRAM(s) IntraMuscular once  heparin   Injectable 5000 Unit(s) SubCutaneous every 12 hours  hydrALAZINE Injectable 10 milliGRAM(s) IV Push two times a day  insulin lispro (ADMELOG) corrective regimen sliding scale   SubCutaneous three times a day before meals    MEDICATIONS  (PRN):      Allergies    No Known Allergies    Intolerances        SUBJECTIVE: in bed in NAD, no acute events overnight     T(F): 97.6 (08-08-21 @ 10:26), Max: 98.8 (08-07-21 @ 17:34)  HR: 99 (08-08-21 @ 10:26) (76 - 99)  BP: 119/82 (08-08-21 @ 10:26) (116/68 - 152/84)  RR: 18 (08-08-21 @ 10:26) (18 - 18)  SpO2: 96% (08-08-21 @ 10:26) (96% - 98%)  Wt(kg): --    PHYSICAL EXAM:  GENERAL: NAD, elderly ,   HEAD:  Atraumatic, Normocephalic  EYES: EOMI, PERRLA, conjunctiva and sclera clear  ENMT: No tonsillar erythema, exudates, or enlargement; Moist mucous membranes, Good dentition, No lesions  NECK: Supple, No JVD, Normal thyroid  CHEST/LUNG: Clear to  auscultation bilaterally; No rales, rhonchi, wheezing, or rubs  bilaterally  HEART: Regular rate and rhythm; No murmurs, rubs, or gallops  ABDOMEN: Soft, Nontender, Nondistended; Bowel sounds present  EXTREMITIES:  2+ Peripheral Pulses in upper extremities, cool distal lower extremity and diminished pulses in feet.    No clubbing, cyanosis, or edema BL LE    SKIN: No rashes or lesions  NERVOUS SYSTEM:  demented bed bound ; follow simple commands, repeating everything you say to her  back at you,  perseveration, old right hemiplegia and old b/l lower extremity weakness  functional quad    LABS:                        14.1   10.48 )-----------( 271      ( 07 Aug 2021 10:54 )             43.1     08-08    139  |  108  |  9   ----------------------------<  149<H>  4.0   |  20<L>  |  0.89    Ca    8.2<L>      08 Aug 2021 07:53    TPro  7.2  /  Alb  3.1<L>  /  TBili  0.5  /  DBili  x   /  AST  16  /  ALT  16  /  AlkPhos  74  08-07    PT/INR - ( 07 Aug 2021 10:54 )   PT: 10.8 sec;   INR: 0.93 ratio         PTT - ( 07 Aug 2021 10:54 )  PTT:32.1 sec    Cultures;   CAPILLARY BLOOD GLUCOSE      POCT Blood Glucose.: 178 mg/dL (08 Aug 2021 07:56)  POCT Blood Glucose.: 157 mg/dL (07 Aug 2021 23:27)  POCT Blood Glucose.: 130 mg/dL (07 Aug 2021 16:30)    Lipid panel:     CARDIAC MARKERS ( 07 Aug 2021 10:54 )  <.015 ng/mL / x     / x     / x     / x            RADIOLOGY & ADDITIONAL TESTS:    < from: Xray Chest 1 View- PORTABLE-Urgent (08.07.21 @ 10:51) >  Findings/  Impression: Cardiomegaly. No lung consolidations.    < end of copied text >  < from: CT Brain Stroke Protocol (08.07.21 @ 10:31) >    IMPRESSION:  No acute intracranial hemorrhage, vasogenic edema, hydrocephalus or midline shift. Age-indeterminate, likely more subacute to chronic right thalamocapsular infarct. Chronic left frontal lobe infarct and severe chronic microvascular change.      < end of copied text >  < from: CT Angio Neck w/ IV Cont (08.07.21 @ 10:46) >  IMPRESSION:  CT perfusion: Nondiagnostic due to motion artifact. No gross evidence for core infarct. If there is continual concern for acute ischemic change, consider correlation with brain MRI which is more sensitive for the detection.    CTA NECK:  Extensive streak artifact and poor opacification of the left carotid circulations limits evaluation of the left vertebral and carotid arteries throughout their course in the neck. Recommend correlation with ultrasound.  Mild stenosis by NASCET criteria proximal right internal carotid artery. No focal occlusion, hemodynamically significant stenosis or dissection in the right carotid or vertebral circulation in the neck.    CTA head:  Severe calcific atherosclerotic change of the cavernous, clinoid and supraclinoid segments of both internal carotid arteries contributing to moderate to severe stenosis.  Patent middle cerebral arteries without significant proximal stenosis or occlusion. Focal short segment occlusion distally to right anterior cerebral artery and moderate stenosis proximal A2 left anterior cerebral artery. 1.5 mm aneurysm suspected proximal M1 right middle cerebral artery.    Severe calcific atherosclerotic change of the V4 vertebral and basilar arteries with associated moderate to severe stenosis described above. No focal occlusion in the posterior vertebrobasilar circulation identified. 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    < end of copied text >    Imaging Personally Reviewed:  [ x] YES      Consultant(s) Notes Reviewed:  [x ] YES     Care Discussed with [x ] Consultants [X ] Patient [x ] Family  [x ]    [x ]  Other; RN

## 2021-08-08 NOTE — PHYSICAL THERAPY INITIAL EVALUATION ADULT - IMPAIRMENTS FOUND, PT EVAL
as per medical records pt with h/o multiple CVA's in the past and has been bed and chair bound for few years/aerobic capacity/endurance/arousal, attention, and cognition/cognitive impairment/ergonomics and body mechanics/gait, locomotion, and balance/gross motor/muscle strength/neuromotor development and sensory integration/ROM/reflex integrity/sensory integrity

## 2021-08-08 NOTE — PROGRESS NOTE ADULT - ASSESSMENT
83F       w/ multiple CVA's, , be d bound,   with  R>L paresis,on ASA 81,  braces  on  lower  limbs        DM, HTN    HLD,        , PAD s/p R AT artery angioplasty w/ SFA/pop occlusion without intervention,   h/o  R foot pain and discoloration  and has   chronic ischemia., pe r vascular at [facility]/dr phillips          h/o  dementia,  h/o   sun-downing,        presented with   c/o left arm weakness   at 6:00 am           LKW-8:00 pm(established from the daughter            CTA h/n-severe b/l cavernous carotid athero+, right A2 focal occlusion, mid-basilar focal high-grade stenosis or occlusion, 2 mm RMCA aneurysm, 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            sub acute to chronic  hyalocapsular  infarcts.  c/c  left frontal lobe infarct with severe microvascular  changes             Due to diffuse severe atherosclerotic disease and poor baseline, she is not candidate for thrombectomy , per  tele  neuro       neuro dr sewell  called by er    on asa/  lipitor  80 mg     HTN/HLD,  on enalapril, toprol  and  lipitor     h/o PAD   pt with functional quadriplegia    on   dvt    ppx/  s.q  heparin    failed  swallow  eval per  nurse/  npo.  iv  fluids   pt  with  advanced   dementia /  goc  to  be  defined   only   granddaughter  available  now      < from: CT Brain Stroke Protocol (08.07.21 @ 10:31) >  IMPRESSION:  No acute intracranial hemorrhage, vasogenic edema, hydrocephalus or midline shift. Age-indeterminate, likely more subacute to chronic right thalamocapsular infarct. Chronic left frontal lobe infarct and severe chronic microvascular change.  I discussed the findings with Dr. Yao at 10:40 AM on 8/7/2021 with read back verification.  --- End of Report ---  < end of copied text >          83F       w/ multiple CVA's, , be d bound,   with  R>L paresis,on ASA 81,  braces  on  lower  limbs        DM, HTN    HLD,        , PAD s/p R AT artery angioplasty w/ SFA/pop occlusion without intervention,   h/o  R foot pain and discoloration  and has   chronic ischemia., pe r vascular at [facility]/dr phillips          h/o  dementia,  h/o   sun-downing,        r/o acute cva: appreciate neurology note w/u in progress.   get mri or repeat ct   echo   lipid panel  \ carotid dopplers      on asa/  lipitor  80 mg       HTN/HLD,  on enalapril, toprol  and  lipitor       h/o PAD on asa     pt with functional quadriplegia      on   dvt    ppx/  s.q  heparin    failed  swallow  eval per  nurse/  npo.  iv  fluids  swallow eval     pt  with  advanced   dementia /  goc  to  be  defined        < from: CT Brain Stroke Protocol (08.07.21 @ 10:31) >  IMPRESSION:  No acute intracranial hemorrhage, vasogenic edema, hydrocephalus or midline shift. Age-indeterminate, likely more subacute to chronic right thalamocapsular infarct. Chronic left frontal lobe infarct and severe chronic microvascular change.  I discussed the findings with Dr. Yao at 10:40 AM on 8/7/2021 with read back verification.  --- End of Report ---  < end of copied text >          83F       w/ multiple CVA's, , be d bound,   with  R>L paresis,on ASA 81,  braces  on  lower  limbs        DM, HTN    HLD,        , PAD s/p R AT artery angioplasty w/ SFA/pop occlusion without intervention,   h/o  R foot pain and discoloration  and has   chronic ischemia., per vascular at [facility]/dr phillips          h/o  dementia,  h/o   sun-downing,        r/o acute cva: appreciate neurology note w/u in progress.   get mri or repeat ct   echo   lipid panel  carotid dopplers  on asa/  lipitor  80 mg       HTN/HLD,  on enalapril, toprol  and  Lipitor       h/o PAD on asa lower extremities are cool to touch.. last doppler c/w pad in 2019   check for progression of disease . ws on eliqus back in 2019 per records will need to d/w family what happened      pt with functional quadriplegia      on   dvt    ppx/  s.q  heparin    failed nursing  swallow  eval per  nurse/  npo.  iv  fluids  await swallow eval by speech therapist           functional quad chronic     pt  with  advanced   dementia /       updated daughter at bedside

## 2021-08-09 ENCOUNTER — NON-APPOINTMENT (OUTPATIENT)
Age: 86
End: 2021-08-09

## 2021-08-09 LAB
ANION GAP SERPL CALC-SCNC: 8 MMOL/L — SIGNIFICANT CHANGE UP (ref 5–17)
BUN SERPL-MCNC: 8 MG/DL — SIGNIFICANT CHANGE UP (ref 7–23)
CALCIUM SERPL-MCNC: 7.8 MG/DL — LOW (ref 8.5–10.1)
CHLORIDE SERPL-SCNC: 112 MMOL/L — HIGH (ref 96–108)
CHOLEST SERPL-MCNC: 118 MG/DL — SIGNIFICANT CHANGE UP
CO2 SERPL-SCNC: 23 MMOL/L — SIGNIFICANT CHANGE UP (ref 22–31)
CREAT SERPL-MCNC: 0.83 MG/DL — SIGNIFICANT CHANGE UP (ref 0.5–1.3)
GLUCOSE BLDC GLUCOMTR-MCNC: 109 MG/DL — HIGH (ref 70–99)
GLUCOSE BLDC GLUCOMTR-MCNC: 126 MG/DL — HIGH (ref 70–99)
GLUCOSE BLDC GLUCOMTR-MCNC: 137 MG/DL — HIGH (ref 70–99)
GLUCOSE BLDC GLUCOMTR-MCNC: 142 MG/DL — HIGH (ref 70–99)
GLUCOSE BLDC GLUCOMTR-MCNC: 143 MG/DL — HIGH (ref 70–99)
GLUCOSE BLDC GLUCOMTR-MCNC: 170 MG/DL — HIGH (ref 70–99)
GLUCOSE SERPL-MCNC: 132 MG/DL — HIGH (ref 70–99)
HCT VFR BLD CALC: 42.7 % — SIGNIFICANT CHANGE UP (ref 34.5–45)
HDLC SERPL-MCNC: 46 MG/DL — LOW
HGB BLD-MCNC: 13.7 G/DL — SIGNIFICANT CHANGE UP (ref 11.5–15.5)
LIPID PNL WITH DIRECT LDL SERPL: 52 MG/DL — SIGNIFICANT CHANGE UP
MAGNESIUM SERPL-MCNC: 1.9 MG/DL — SIGNIFICANT CHANGE UP (ref 1.6–2.6)
MCHC RBC-ENTMCNC: 29.8 PG — SIGNIFICANT CHANGE UP (ref 27–34)
MCHC RBC-ENTMCNC: 32.1 GM/DL — SIGNIFICANT CHANGE UP (ref 32–36)
MCV RBC AUTO: 92.8 FL — SIGNIFICANT CHANGE UP (ref 80–100)
NON HDL CHOLESTEROL: 72 MG/DL — SIGNIFICANT CHANGE UP
NRBC # BLD: 0 /100 WBCS — SIGNIFICANT CHANGE UP (ref 0–0)
PHOSPHATE SERPL-MCNC: 2.8 MG/DL — SIGNIFICANT CHANGE UP (ref 2.5–4.5)
PLATELET # BLD AUTO: 228 K/UL — SIGNIFICANT CHANGE UP (ref 150–400)
POTASSIUM SERPL-MCNC: 4.1 MMOL/L — SIGNIFICANT CHANGE UP (ref 3.5–5.3)
POTASSIUM SERPL-SCNC: 4.1 MMOL/L — SIGNIFICANT CHANGE UP (ref 3.5–5.3)
RBC # BLD: 4.6 M/UL — SIGNIFICANT CHANGE UP (ref 3.8–5.2)
RBC # FLD: 13.2 % — SIGNIFICANT CHANGE UP (ref 10.3–14.5)
SODIUM SERPL-SCNC: 143 MMOL/L — SIGNIFICANT CHANGE UP (ref 135–145)
TRIGL SERPL-MCNC: 101 MG/DL — SIGNIFICANT CHANGE UP
WBC # BLD: 7.18 K/UL — SIGNIFICANT CHANGE UP (ref 3.8–10.5)
WBC # FLD AUTO: 7.18 K/UL — SIGNIFICANT CHANGE UP (ref 3.8–10.5)

## 2021-08-09 PROCEDURE — 93880 EXTRACRANIAL BILAT STUDY: CPT | Mod: 26

## 2021-08-09 PROCEDURE — 70551 MRI BRAIN STEM W/O DYE: CPT | Mod: 26

## 2021-08-09 PROCEDURE — 93925 LOWER EXTREMITY STUDY: CPT | Mod: 26

## 2021-08-09 PROCEDURE — 99232 SBSQ HOSP IP/OBS MODERATE 35: CPT

## 2021-08-09 PROCEDURE — 99233 SBSQ HOSP IP/OBS HIGH 50: CPT

## 2021-08-09 PROCEDURE — 93923 UPR/LXTR ART STDY 3+ LVLS: CPT | Mod: 26

## 2021-08-09 RX ORDER — ASPIRIN/CALCIUM CARB/MAGNESIUM 324 MG
81 TABLET ORAL DAILY
Refills: 0 | Status: DISCONTINUED | OUTPATIENT
Start: 2021-08-09 | End: 2021-08-11

## 2021-08-09 RX ORDER — CLOPIDOGREL BISULFATE 75 MG/1
75 TABLET, FILM COATED ORAL DAILY
Refills: 0 | Status: DISCONTINUED | OUTPATIENT
Start: 2021-08-09 | End: 2021-08-11

## 2021-08-09 RX ORDER — SCOPALAMINE 1 MG/3D
1 PATCH, EXTENDED RELEASE TRANSDERMAL
Refills: 0 | Status: DISCONTINUED | OUTPATIENT
Start: 2021-08-09 | End: 2021-08-11

## 2021-08-09 RX ORDER — METOPROLOL TARTRATE 50 MG
25 TABLET ORAL
Refills: 0 | Status: DISCONTINUED | OUTPATIENT
Start: 2021-08-09 | End: 2021-08-11

## 2021-08-09 RX ADMIN — ATORVASTATIN CALCIUM 80 MILLIGRAM(S): 80 TABLET, FILM COATED ORAL at 21:58

## 2021-08-09 RX ADMIN — CLOPIDOGREL BISULFATE 75 MILLIGRAM(S): 75 TABLET, FILM COATED ORAL at 17:23

## 2021-08-09 RX ADMIN — HEPARIN SODIUM 5000 UNIT(S): 5000 INJECTION INTRAVENOUS; SUBCUTANEOUS at 05:47

## 2021-08-09 RX ADMIN — Medication 1 DROP(S): at 16:42

## 2021-08-09 RX ADMIN — Medication 300 MILLIGRAM(S): at 11:31

## 2021-08-09 RX ADMIN — Medication 1 DROP(S): at 05:44

## 2021-08-09 RX ADMIN — SCOPALAMINE 1 PATCH: 1 PATCH, EXTENDED RELEASE TRANSDERMAL at 17:32

## 2021-08-09 RX ADMIN — SCOPALAMINE 1 PATCH: 1 PATCH, EXTENDED RELEASE TRANSDERMAL at 19:29

## 2021-08-09 RX ADMIN — SODIUM CHLORIDE 60 MILLILITER(S): 9 INJECTION, SOLUTION INTRAVENOUS at 04:05

## 2021-08-09 RX ADMIN — Medication 25 MILLIGRAM(S): at 17:23

## 2021-08-09 RX ADMIN — Medication 5 MILLIGRAM(S): at 17:23

## 2021-08-09 RX ADMIN — HEPARIN SODIUM 5000 UNIT(S): 5000 INJECTION INTRAVENOUS; SUBCUTANEOUS at 17:24

## 2021-08-09 RX ADMIN — Medication 1 DROP(S): at 21:58

## 2021-08-09 RX ADMIN — SODIUM CHLORIDE 60 MILLILITER(S): 9 INJECTION, SOLUTION INTRAVENOUS at 21:57

## 2021-08-09 NOTE — SWALLOW BEDSIDE ASSESSMENT ADULT - MODE OF PRESENTATION
spoon/fed by clinician pt needed HOHA- limited use of left and right upper extremity/cup/self fed dtr requested straw/cup/straw

## 2021-08-09 NOTE — SWALLOW BEDSIDE ASSESSMENT ADULT - SLP GENERAL OBSERVATIONS
pt seen bedside alert and oriented to self and place, pt engaged with SLP, she responded to simple want questions for po trials- and although noted reduced verbal output, she communicated a few needs. pt with difficulty following one step directions despite repetition and noted strained /strangled vocal quality with short utterance. dtr present after the start of the assessment

## 2021-08-09 NOTE — SWALLOW BEDSIDE ASSESSMENT ADULT - COMMENTS
MRI head no contrast 8/9/2021 IMPRESSION: Acute right JACQUI territory infarctions.    CXR 8/7/2021Impression: Cardiomegaly. No lung consolidations.

## 2021-08-09 NOTE — PROGRESS NOTE ADULT - SUBJECTIVE AND OBJECTIVE BOX
Patient is a 85y old  Female who presents with a chief complaint of s/p  stoke  code (08 Aug 2021 07:08)      OVERNIGHT EVENTS:  none    MEDICATIONS  (STANDING):  artificial  tears Solution 1 Drop(s) Both EYES three times a day  aspirin Suppository 300 milliGRAM(s) Rectal daily  atorvastatin 80 milliGRAM(s) Oral at bedtime  dextrose 40% Gel 15 Gram(s) Oral once  dextrose 5% + sodium chloride 0.9%. 1000 milliLiter(s) (60 mL/Hr) IV Continuous <Continuous>  dextrose 5%. 1000 milliLiter(s) (100 mL/Hr) IV Continuous <Continuous>  dextrose 5%. 1000 milliLiter(s) (50 mL/Hr) IV Continuous <Continuous>  dextrose 50% Injectable 25 Gram(s) IV Push once  dextrose 50% Injectable 12.5 Gram(s) IV Push once  dextrose 50% Injectable 25 Gram(s) IV Push once  glucagon  Injectable 1 milliGRAM(s) IntraMuscular once  heparin   Injectable 5000 Unit(s) SubCutaneous every 12 hours  hydrALAZINE Injectable 10 milliGRAM(s) IV Push two times a day  insulin lispro (ADMELOG) corrective regimen sliding scale   SubCutaneous three times a day before meals    MEDICATIONS  (PRN):      Allergies    No Known Allergies    Intolerances        SUBJECTIVE: in bed in NAD, no acute events overnight       Vital Signs Last 24 Hrs  T(C): 36.7 (09 Aug 2021 11:31), Max: 36.8 (08 Aug 2021 13:42)  T(F): 98.1 (09 Aug 2021 11:31), Max: 98.2 (08 Aug 2021 13:42)  HR: 84 (09 Aug 2021 11:31) (78 - 89)  BP: 119/80 (09 Aug 2021 11:31) (109/70 - 141/71)  BP(mean): --  RR: 18 (09 Aug 2021 11:31) (17 - 18)  SpO2: 97% (09 Aug 2021 11:31) (96% - 97%)    PHYSICAL EXAM:  GENERAL: NAD, elderly ,   HEAD:  Atraumatic, Normocephalic  EYES: EOMI, PERRLA, conjunctiva and sclera clear  ENMT: No tonsillar erythema, exudates, or enlargement; Moist mucous membranes, Good dentition, No lesions  NECK: Supple, No JVD, Normal thyroid  CHEST/LUNG: Clear to  auscultation bilaterally; No rales, rhonchi, wheezing, or rubs  bilaterally  HEART: Regular rate and rhythm; No murmurs, rubs, or gallops  ABDOMEN: Soft, Nontender, Nondistended; Bowel sounds present  EXTREMITIES:  2+ Peripheral Pulses in upper extremities, cool distal lower extremity and diminished pulses in feet.    No clubbing, cyanosis, or edema BL LE    SKIN: No rashes or lesions  NERVOUS SYSTEM:  demented bed bound ; follow simple commands, repeating everything you say to her  back at you,  perseveration, old right hemiplegia and old b/l lower extremity weakness  functional quad    LABS:                                         13.7   7.18  )-----------( 228      ( 09 Aug 2021 06:38 )             42.7   08-09    143  |  112<H>  |  8   ----------------------------<  132<H>  4.1   |  23  |  0.83    Ca    7.8<L>      09 Aug 2021 06:38  Phos  2.8     08-09  Mg     1.9     08-09         PTT - ( 07 Aug 2021 10:54 )  PTT:32.1 sec    Cultures;     CAPILLARY BLOOD GLUCOSE      POCT Blood Glucose.: 109 mg/dL (09 Aug 2021 11:00)  POCT Blood Glucose.: 137 mg/dL (09 Aug 2021 08:14)  POCT Blood Glucose.: 142 mg/dL (09 Aug 2021 06:55)  POCT Blood Glucose.: 143 mg/dL (08 Aug 2021 23:55)  POCT Blood Glucose.: 136 mg/dL (08 Aug 2021 16:52)  POCT Blood Glucose.: 138 mg/dL (08 Aug 2021 12:11)  )    Lipid panel:     CARDIAC MARKERS ( 07 Aug 2021 10:54 )  <.015 ng/mL / x     / x     / x     / x            RADIOLOGY & ADDITIONAL TESTS:    < from: Xray Chest 1 View- PORTABLE-Urgent (08.07.21 @ 10:51) >  Findings/  Impression: Cardiomegaly. No lung consolidations.    < end of copied text >  < from: CT Brain Stroke Protocol (08.07.21 @ 10:31) >    IMPRESSION:  No acute intracranial hemorrhage, vasogenic edema, hydrocephalus or midline shift. Age-indeterminate, likely more subacute to chronic right thalamocapsular infarct. Chronic left frontal lobe infarct and severe chronic microvascular change.      < end of copied text >  < from: CT Angio Neck w/ IV Cont (08.07.21 @ 10:46) >  IMPRESSION:  CT perfusion: Nondiagnostic due to motion artifact. No gross evidence for core infarct. If there is continual concern for acute ischemic change, consider correlation with brain MRI which is more sensitive for the detection.    CTA NECK:  Extensive streak artifact and poor opacification of the left carotid circulations limits evaluation of the left vertebral and carotid arteries throughout their course in the neck. Recommend correlation with ultrasound.  Mild stenosis by NASCET criteria proximal right internal carotid artery. No focal occlusion, hemodynamically significant stenosis or dissection in the right carotid or vertebral circulation in the neck.    CTA head:  Severe calcific atherosclerotic change of the cavernous, clinoid and supraclinoid segments of both internal carotid arteries contributing to moderate to severe stenosis.  Patent middle cerebral arteries without significant proximal stenosis or occlusion. Focal short segment occlusion distally to right anterior cerebral artery and moderate stenosis proximal A2 left anterior cerebral artery. 1.5 mm aneurysm suspected proximal M1 right middle cerebral artery.    Severe calcific atherosclerotic change of the V4 vertebral and basilar arteries with associated moderate to severe stenosis described above. No focal occlusion in the posterior vertebrobasilar circulation identified. 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    < end of copied text >      < from: MR Head No Cont (08.09.21 @ 10:38) >    IMPRESSION: Acute right JACQUI territory infarctions.    < end of copied text >  < from: US Duplex Arterial Lower Ext Compl, Bilateral (08.09.21 @ 09:38) >  IMPRESSION:    Segmental occlusion of the bilateral posterior tibial arteries and left peroneal artery.      < end of copied text >  < from: US Duplex Carotid Arteries Complete, Bilateral (08.09.21 @ 08:56) >  IMPRESSION: No significant hemodynamic stenosis of either carotid artery.      < end of copied text >    Imaging Personally Reviewed:  [ x] YES      Consultant(s) Notes Reviewed:  [x ] YES     Care Discussed with [x ] Consultants [X ] Patient [x ] Family  [x ]    [x ]  Other; RN Patient is a 85y old  Female who presents with a chief complaint of s/p  stoke  code (08 Aug 2021 07:08)      OVERNIGHT EVENTS:  none    MEDICATIONS  (STANDING):  artificial  tears Solution 1 Drop(s) Both EYES three times a day  aspirin Suppository 300 milliGRAM(s) Rectal daily  atorvastatin 80 milliGRAM(s) Oral at bedtime  dextrose 40% Gel 15 Gram(s) Oral once  dextrose 5% + sodium chloride 0.9%. 1000 milliLiter(s) (60 mL/Hr) IV Continuous <Continuous>  dextrose 5%. 1000 milliLiter(s) (100 mL/Hr) IV Continuous <Continuous>  dextrose 5%. 1000 milliLiter(s) (50 mL/Hr) IV Continuous <Continuous>  dextrose 50% Injectable 25 Gram(s) IV Push once  dextrose 50% Injectable 12.5 Gram(s) IV Push once  dextrose 50% Injectable 25 Gram(s) IV Push once  glucagon  Injectable 1 milliGRAM(s) IntraMuscular once  heparin   Injectable 5000 Unit(s) SubCutaneous every 12 hours  hydrALAZINE Injectable 10 milliGRAM(s) IV Push two times a day  insulin lispro (ADMELOG) corrective regimen sliding scale   SubCutaneous three times a day before meals    MEDICATIONS  (PRN):      Allergies    No Known Allergies    Intolerances        SUBJECTIVE: in bed in NAD, no acute events overnight       Vital Signs Last 24 Hrs  T(C): 36.7 (09 Aug 2021 11:31), Max: 36.8 (08 Aug 2021 13:42)  T(F): 98.1 (09 Aug 2021 11:31), Max: 98.2 (08 Aug 2021 13:42)  HR: 84 (09 Aug 2021 11:31) (78 - 89)  BP: 119/80 (09 Aug 2021 11:31) (109/70 - 141/71)  BP(mean): --  RR: 18 (09 Aug 2021 11:31) (17 - 18)  SpO2: 97% (09 Aug 2021 11:31) (96% - 97%)    PHYSICAL EXAM:  GENERAL: NAD, elderly ,   HEAD:  Atraumatic, Normocephalic  EYES: EOMI, PERRLA, conjunctiva and sclera clear  ENMT: No tonsillar erythema, exudates, or enlargement; Moist mucous membranes, Good dentition, No lesions  NECK: Supple, No JVD, Normal thyroid  CHEST/LUNG: Clear to  auscultation bilaterally; No rales, rhonchi, wheezing, or rubs  bilaterally  HEART: Regular rate and rhythm; No murmurs, rubs, or gallops  ABDOMEN: Soft, Nontender, Nondistended; Bowel sounds present  EXTREMITIES:  2+ Peripheral Pulses in upper extremities, cool distal lower extremity and diminished pulses in feet.    No clubbing, cyanosis, or edema BL LE    SKIN: No rashes or lesions  NERVOUS SYSTEM:  demented bed bound ; follow simple commands, repeating everything you say to her  back at you,  perseveration, old right hemiparesis  and old b/l lower extremity weakness  functional quad    LABS:                                         13.7   7.18  )-----------( 228      ( 09 Aug 2021 06:38 )             42.7   08-09    143  |  112<H>  |  8   ----------------------------<  132<H>  4.1   |  23  |  0.83    Ca    7.8<L>      09 Aug 2021 06:38  Phos  2.8     08-09  Mg     1.9     08-09         PTT - ( 07 Aug 2021 10:54 )  PTT:32.1 sec    Cultures;     CAPILLARY BLOOD GLUCOSE      POCT Blood Glucose.: 109 mg/dL (09 Aug 2021 11:00)  POCT Blood Glucose.: 137 mg/dL (09 Aug 2021 08:14)  POCT Blood Glucose.: 142 mg/dL (09 Aug 2021 06:55)  POCT Blood Glucose.: 143 mg/dL (08 Aug 2021 23:55)  POCT Blood Glucose.: 136 mg/dL (08 Aug 2021 16:52)  POCT Blood Glucose.: 138 mg/dL (08 Aug 2021 12:11)  )    Lipid panel:     CARDIAC MARKERS ( 07 Aug 2021 10:54 )  <.015 ng/mL / x     / x     / x     / x            RADIOLOGY & ADDITIONAL TESTS:    < from: Xray Chest 1 View- PORTABLE-Urgent (08.07.21 @ 10:51) >  Findings/  Impression: Cardiomegaly. No lung consolidations.    < end of copied text >  < from: CT Brain Stroke Protocol (08.07.21 @ 10:31) >    IMPRESSION:  No acute intracranial hemorrhage, vasogenic edema, hydrocephalus or midline shift. Age-indeterminate, likely more subacute to chronic right thalamocapsular infarct. Chronic left frontal lobe infarct and severe chronic microvascular change.      < end of copied text >  < from: CT Angio Neck w/ IV Cont (08.07.21 @ 10:46) >  IMPRESSION:  CT perfusion: Nondiagnostic due to motion artifact. No gross evidence for core infarct. If there is continual concern for acute ischemic change, consider correlation with brain MRI which is more sensitive for the detection.    CTA NECK:  Extensive streak artifact and poor opacification of the left carotid circulations limits evaluation of the left vertebral and carotid arteries throughout their course in the neck. Recommend correlation with ultrasound.  Mild stenosis by NASCET criteria proximal right internal carotid artery. No focal occlusion, hemodynamically significant stenosis or dissection in the right carotid or vertebral circulation in the neck.    CTA head:  Severe calcific atherosclerotic change of the cavernous, clinoid and supraclinoid segments of both internal carotid arteries contributing to moderate to severe stenosis.  Patent middle cerebral arteries without significant proximal stenosis or occlusion. Focal short segment occlusion distally to right anterior cerebral artery and moderate stenosis proximal A2 left anterior cerebral artery. 1.5 mm aneurysm suspected proximal M1 right middle cerebral artery.    Severe calcific atherosclerotic change of the V4 vertebral and basilar arteries with associated moderate to severe stenosis described above. No focal occlusion in the posterior vertebrobasilar circulation identified. 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    < end of copied text >      < from: MR Head No Cont (08.09.21 @ 10:38) >    IMPRESSION: Acute right JACQUI territory infarctions.    < end of copied text >  < from: US Duplex Arterial Lower Ext Compl, Bilateral (08.09.21 @ 09:38) >  IMPRESSION:    Segmental occlusion of the bilateral posterior tibial arteries and left peroneal artery.      < end of copied text >  < from: US Duplex Carotid Arteries Complete, Bilateral (08.09.21 @ 08:56) >  IMPRESSION: No significant hemodynamic stenosis of either carotid artery.      < end of copied text >    Imaging Personally Reviewed:  [ x] YES      Consultant(s) Notes Reviewed:  [x ] YES     Care Discussed with [x ] Consultants [X ] Patient [x ] Family  [x ]    [x ]  Other; RN

## 2021-08-09 NOTE — PROGRESS NOTE ADULT - ASSESSMENT
83F       w/ multiple CVA's, , be d bound,   with  R>L paresis,on ASA 81,  braces  on  lower  limbs        DM, HTN    HLD,        , PAD s/p R AT artery angioplasty w/ SFA/pop occlusion without intervention,   h/o  R foot pain and discoloration  and has   chronic ischemia., per vascular at [facility]/dr phillips          h/o  dementia,  h/o   sun-downing,        r/o acute cva: appreciate neurology note w/u in progress.   get mri or repeat ct   echo   lipid panel  carotid dopplers  on asa/  lipitor  80 mg       8/9/202 carotid dopplers no significant stenosis  mri c/w acute cva in [place]      HTN/HLD,  on enalapril, toprol  and  Lipitor       h/o PAD on asa lower extremities are cool to touch.. last doppler c/w pad in 2019   check for progression of disease . was on Eliquis back in 2019 per records will need to d/w family what happened   8/9/2021 arterial dopplers c/w PAD.     pt with functional quadriplegia      on   dvt    ppx/  s.q  heparin    failed nursing  swallow  eval per  nurse/  npo.  iv  fluids  await swallow eval by speech therapist           functional quad chronic     pt  with  advanced   dementia /       updated daughter at bedside

## 2021-08-09 NOTE — SWALLOW BEDSIDE ASSESSMENT ADULT - SWALLOW EVAL: RECOMMENDED FEEDING/EATING TECHNIQUES
allow for swallow between intakes/crush medication (when feasible)/maintain upright posture during/after eating for 30 mins/oral hygiene/small sips/bites

## 2021-08-09 NOTE — SWALLOW BEDSIDE ASSESSMENT ADULT - SWALLOW EVAL: DIAGNOSIS
pt presented with baseline decreased mgmt of secretions- pooling and expectoration of clear thin secretions, slightly increased oral transport time, with suspected delay in swallow trigger with reduced hyolaryngeal excursion. no overt signs of aspiration with consistencies trialed.

## 2021-08-09 NOTE — PROGRESS NOTE ADULT - SUBJECTIVE AND OBJECTIVE BOX
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    CN: PERRLA, EOMI, visual fields intact, face symmetric,  +dysarthria, symmetric palatal elevation, tongue midline  Motor: +spastic paraplegia, 3/5 power in left and right upper extremities with increased tone bilaterally  Sensation: unable to assess  Reflexes: 3+ at biceps, brachioradialis, patella, ankle bilaterally.  Extensor plantar response bilaterally.  No clonus  Coordination: unable to assess    NIHSS: 17    Assessment:  86 yo woman with dementia and severe cerebrovascular disease likely related to age and chronic vascular risk factors (HTN, HLD, DM), and prior ischemic strokes with residual bilateral lower extremity weakness and right arm weakness, now presenting with new onset of left arm weakness.     Small acute infarcts in right JACQUI distribution, suspect artery-to-artey emboli from atherosclerotic disease.    Recommendations:  1. Carotid US (CTA unable to evaluate carotids well)  2. TTE  3. Antiplatelet therapy: Aspirin 81mg daily +Plavix 75mg daily x 90 days, then ASA 81mg daily monotherapy (severe intracranial atherosclerosis)  4. Intensive statin therapy: Atorvastatin 80mg daily, target LDL < 70  5. Optimize control of HTN, DM  6. Outpatient stroke/vascular neurology consult to review CTA findings (stenoses, aneurysms)  7. Speech and swallow alisonal    Sabas Downey MD  [facility] Department of Neurology  Epilepsy Center       NEUROLOGY CONSULT PROGRESS NOTE    HPI:  No significant changes overnight    MRI brain: small acute ischemic infarcts in distal right JACQUI distribution  Carotid US: no significant stenosis    Head CT: old left JACQUI infarct, old right thalamocapsular infarct, no acute infarct  CTA head/neck: limited study, no LVO, multiple intracranial stenoses, 1.5mm aneurysm at right MI MCA, 4mm fusiform aneurysm at basilar tip    NEUROLOGICAL EXAM:  T 98.1 F  /80  HR 84  MS: Awake, alert, oriented x 1 (not date or location), language: perseverates with echolalia, Follows simple commands, naming impaired.    CN: PERRLA, EOMI, visual fields intact, face symmetric,  +dysarthria, symmetric palatal elevation, tongue midline  Motor: +spastic paraplegia, 3/5 power in left and right upper extremities with increased tone bilaterally  Sensation: unable to assess  Reflexes: 3+ at biceps, brachioradialis, patella, ankle bilaterally.  Extensor plantar response bilaterally.  No clonus  Coordination: unable to assess    NIHSS: 17    Assessment:  86 yo woman with dementia and severe cerebrovascular disease likely related to age and chronic vascular risk factors (HTN, HLD, DM), and prior ischemic strokes with residual bilateral lower extremity weakness and right arm weakness, now presenting with new onset of left arm weakness.     Small acute infarcts in right JACQUI distribution, suspect artery-to-artey emboli from atherosclerotic disease.    Recommendations:  1. TTE  2. Antiplatelet therapy: Aspirin 81mg daily +Plavix 75mg daily x 90 days, then ASA 81mg daily monotherapy (severe intracranial atherosclerosis)  3. Intensive statin therapy: Atorvastatin 80mg daily, target LDL < 70  4. Optimize control of HTN, DM  5. Outpatient stroke/vascular neurology consult to review CTA findings (stenoses, aneurysms)  6. Speech and swallow lloyd Downey MD  [facility] Department of Neurology  Epilepsy Center

## 2021-08-10 ENCOUNTER — TRANSCRIPTION ENCOUNTER (OUTPATIENT)
Age: 86
End: 2021-08-10

## 2021-08-10 LAB
COVID-19 SPIKE DOMAIN AB INTERP: POSITIVE
COVID-19 SPIKE DOMAIN ANTIBODY RESULT: >250 U/ML — HIGH
GLUCOSE BLDC GLUCOMTR-MCNC: 148 MG/DL — HIGH (ref 70–99)
GLUCOSE BLDC GLUCOMTR-MCNC: 148 MG/DL — HIGH (ref 70–99)
GLUCOSE BLDC GLUCOMTR-MCNC: 159 MG/DL — HIGH (ref 70–99)
GLUCOSE BLDC GLUCOMTR-MCNC: 242 MG/DL — HIGH (ref 70–99)
SARS-COV-2 IGG+IGM SERPL QL IA: >250 U/ML — HIGH
SARS-COV-2 IGG+IGM SERPL QL IA: POSITIVE

## 2021-08-10 PROCEDURE — 99232 SBSQ HOSP IP/OBS MODERATE 35: CPT

## 2021-08-10 PROCEDURE — 73030 X-RAY EXAM OF SHOULDER: CPT | Mod: 26,LT

## 2021-08-10 PROCEDURE — 93306 TTE W/DOPPLER COMPLETE: CPT | Mod: 26

## 2021-08-10 RX ORDER — ATORVASTATIN CALCIUM 80 MG/1
1 TABLET, FILM COATED ORAL
Qty: 0 | Refills: 0 | DISCHARGE
Start: 2021-08-10

## 2021-08-10 RX ORDER — SCOPALAMINE 1 MG/3D
1 PATCH, EXTENDED RELEASE TRANSDERMAL
Qty: 10 | Refills: 0
Start: 2021-08-10

## 2021-08-10 RX ORDER — CLOPIDOGREL BISULFATE 75 MG/1
1 TABLET, FILM COATED ORAL
Qty: 90 | Refills: 0
Start: 2021-08-10 | End: 2021-11-07

## 2021-08-10 RX ORDER — METOPROLOL TARTRATE 50 MG
1 TABLET ORAL
Qty: 0 | Refills: 0 | DISCHARGE
Start: 2021-08-10

## 2021-08-10 RX ORDER — ASPIRIN/CALCIUM CARB/MAGNESIUM 324 MG
1 TABLET ORAL
Qty: 0 | Refills: 0 | DISCHARGE
Start: 2021-08-10

## 2021-08-10 RX ADMIN — Medication 81 MILLIGRAM(S): at 11:48

## 2021-08-10 RX ADMIN — Medication 1 DROP(S): at 21:19

## 2021-08-10 RX ADMIN — Medication 1 DROP(S): at 05:45

## 2021-08-10 RX ADMIN — Medication 25 MILLIGRAM(S): at 17:58

## 2021-08-10 RX ADMIN — CLOPIDOGREL BISULFATE 75 MILLIGRAM(S): 75 TABLET, FILM COATED ORAL at 11:48

## 2021-08-10 RX ADMIN — ATORVASTATIN CALCIUM 80 MILLIGRAM(S): 80 TABLET, FILM COATED ORAL at 21:19

## 2021-08-10 RX ADMIN — Medication 25 MILLIGRAM(S): at 05:45

## 2021-08-10 RX ADMIN — Medication 1 DROP(S): at 13:40

## 2021-08-10 RX ADMIN — Medication 2: at 11:48

## 2021-08-10 RX ADMIN — SCOPALAMINE 1 PATCH: 1 PATCH, EXTENDED RELEASE TRANSDERMAL at 07:55

## 2021-08-10 RX ADMIN — Medication 5 MILLIGRAM(S): at 09:07

## 2021-08-10 RX ADMIN — HEPARIN SODIUM 5000 UNIT(S): 5000 INJECTION INTRAVENOUS; SUBCUTANEOUS at 05:45

## 2021-08-10 RX ADMIN — HEPARIN SODIUM 5000 UNIT(S): 5000 INJECTION INTRAVENOUS; SUBCUTANEOUS at 17:58

## 2021-08-10 RX ADMIN — Medication 5 MILLIGRAM(S): at 05:44

## 2021-08-10 RX ADMIN — SCOPALAMINE 1 PATCH: 1 PATCH, EXTENDED RELEASE TRANSDERMAL at 19:44

## 2021-08-10 RX ADMIN — SCOPALAMINE 1 PATCH: 1 PATCH, EXTENDED RELEASE TRANSDERMAL at 19:43

## 2021-08-10 NOTE — PROGRESS NOTE ADULT - ASSESSMENT
83F       w/ multiple CVA's, , be d bound,   with  R>L paresis,on ASA 81,  braces  on  lower  limbs        DM, HTN    HLD,        , PAD s/p R AT artery angioplasty w/ SFA/pop occlusion without intervention,   h/o  R foot pain and discoloration  and has   chronic ischemia., per vascular at [facility]/dr phillips          h/o  dementia,  h/o   sun-downing,        r/o acute cva: appreciate neurology note w/u in progress.   get mri or repeat ct   echo   lipid panel  carotid dopplers  on asa/  lipitor  80 mg       8/9/202 carotid dopplers no significant stenosis  mri c/w acute cva in [place]      HTN/HLD,  on enalapril, toprol  and  Lipitor       h/o PAD on asa lower extremities are cool to touch.. last doppler c/w pad in 2019   check for progression of disease . was on Eliquis back in 2019 per records will need to d/w family what happened   8/9/2021 arterial dopplers c/w PAD.  8/10/2021 d/w outpt vascular doc dr. li ( [phone]) last seen in 2019 no angio performed nor surgical intervention . nor did he prescribe AC.   kassandra here c/w significant pad . em[place] consult Dr. Mitchell      pt with functional quadriplegia      on   dvt    ppx/  s.q  heparin    failed nursing  swallow  eval per  nurse/  npo.  iv  fluids  await swallow eval by speech therapist   8/10/2021 diet ordered     left shoulder decreased rom : f/u xray        hypersalivation chronic per daughters started scopolamine patch and suctioning         functional quad chronic     pt  with  advanced   dementia /       updated daughter at bedside   83F       w/ multiple CVA's, , be d bound,   with  R>L paresis,on ASA 81,  braces  on  lower  limbs        DM, HTN    HLD,        , PAD s/p R AT artery angioplasty w/ SFA/pop occlusion without intervention,   h/o  R foot pain and discoloration  and has   chronic ischemia., per vascular at [facility]/dr phillips          h/o  dementia,  h/o   sun-downing,        r/o acute cva: appreciate neurology note w/u in progress.   get mri or repeat ct   echo   lipid panel  carotid dopplers  on asa/  lipitor  80 mg       8/9/202 carotid dopplers no significant stenosis  mri c/w acute cva in [place]      HTN/HLD,  on enalapril, toprol  and  Lipitor       h/o PAD on asa lower extremities are cool to touch.. last doppler c/w pad in 2019   check for progression of disease . was on Eliquis back in 2019 per records will need to d/w family what happened   8/9/2021 arterial dopplers c/w PAD.  8/10/2021 d/w outpt vascular doc dr. li ( [phone]) last seen in 2019 no angio performed nor surgical intervention . nor did he prescribe AC.   kassandra here c/w significant pad . em[place] consult Dr. Mitchell      pt with functional quadriplegia      on   dvt    ppx/  s.q  heparin    failed nursing  swallow  eval per  nurse/  npo.  iv  fluids  await swallow eval by speech therapist   8/10/2021 diet ordered     left shoulder decreased rom : f/u xray        hypersalivation chronic per daughters started scopolamine patch and suctioning         functional quad chronic     pt  with  advanced   dementia /       updated daughter at bedside  8/10/2021 d/c in am

## 2021-08-10 NOTE — DISCHARGE NOTE PROVIDER - CARE PROVIDERS DIRECT ADDRESSES
,[email or web address]

## 2021-08-10 NOTE — DISCHARGE NOTE PROVIDER - NSDCMRMEDTOKEN_GEN_ALL_CORE_FT
aspirin 81 mg oral tablet, chewable: 1 tab(s) orally once a day  atorvastatin 80 mg oral tablet: 1 tab(s) orally once a day (at bedtime)  clopidogrel 75 mg oral tablet: 1 tab(s) orally once a day for 90 days only (8/9-11/9)  enalapril 10 mg oral tablet: 1 tab(s) orally once a day  metFORMIN 1000 mg oral tablet:  orally once a day  metoprolol tartrate 25 mg oral tablet: 1 tab(s) orally 2 times a day  ocular lubricant ophthalmic solution: 1 drop(s) to each affected eye 3 times a day  omeprazole 20 mg oral delayed release capsule: 1 cap(s) orally once a day  scopolamine 1 mg/72 hr transdermal film, extended release: Apply topically to affected area every 72 hours

## 2021-08-10 NOTE — DISCHARGE NOTE PROVIDER - NSDCCPCAREPLAN_GEN_ALL_CORE_FT
PRINCIPAL DISCHARGE DIAGNOSIS  Diagnosis: Cerebrovascular accident (CVA), unspecified mechanism  Assessment and Plan of Treatment: acute JACQUI CVA. continue aspirin and plavix for 90 days, and then only aspirin indefinitely. Continue statin therapy. Follow up with neurologist in 2 weeks.      SECONDARY DISCHARGE DIAGNOSES  Diagnosis: DM (diabetes mellitus)  Assessment and Plan of Treatment: continue metformin. Hga1c 6.1    Diagnosis: HTN (hypertension)  Assessment and Plan of Treatment: continue enalapril and toprol XL    Diagnosis: PAD (peripheral artery disease)  Assessment and Plan of Treatment: continue aspirin and plavix. Please follow up with vascular surgeon ASAP    Diagnosis: Dementia  Assessment and Plan of Treatment: supportive care    Diagnosis: Functional quadriplegia  Assessment and Plan of Treatment: supportive care     PRINCIPAL DISCHARGE DIAGNOSIS  Diagnosis: Cerebrovascular accident (CVA), unspecified mechanism  Assessment and Plan of Treatment: acute JACQUI CVA. continue aspirin and plavix for 90 days, and then only aspirin indefinitely. Continue statin therapy. Follow up with neurologist in 2 weeks.      SECONDARY DISCHARGE DIAGNOSES  Diagnosis: PAD (peripheral artery disease)  Assessment and Plan of Treatment: continue aspirin and plavix. Please follow up with vascular surgeon Dr. Solis    Diagnosis: DM (diabetes mellitus)  Assessment and Plan of Treatment: continue metformin. Hga1c 6.1    Diagnosis: HTN (hypertension)  Assessment and Plan of Treatment: continue enalapril and toprol XL    Diagnosis: Dementia  Assessment and Plan of Treatment: supportive care    Diagnosis: Functional quadriplegia  Assessment and Plan of Treatment: supportive care

## 2021-08-10 NOTE — DISCHARGE NOTE PROVIDER - HOSPITAL COURSE
83F with PMHx multiple CVAs, bed bound, R>L hemiparesis on aspirin, PAD s/p right AT artery angioplasty SFA pop occlusion without intervention, dementia, HTN, HLD, DM admitted with acute JACQUI CVA.       Acute CVA:   -neurology consulted, continue with aspirin and plavix for 90 days, and then continue on aspirin alone.   -follow up with neurology in 2 weeks  -US carotid no significant stenosis, LDL 52, hga1c 6.1  -PT/OT not candidate because of bedbound status  -discharge home with 24h HHA     HTN/HLD  -continue enalapril, toprol  and  Lipitor    PAD w/ angioplasty  -on asa lower extremities are cool to touch.. last doppler c/w pad in 2019  -check for progression of disease . was on Eliquis back in 2019 per records will need to d/w family what happened   8/9/2021 arterial dopplers c/w PAD.  8/10/2021 d/w outpt vascular doc dr. li ( [id number]) last seen in 2019 no angio performed nor surgical intervention . nor did he prescribe AC.   kassandra here c/w significant pad . emiliana consult Dr. Mitchell      pt with functional quadriplegia      on   dvt    ppx/  s.q  heparin     left shoulder decreased rom : f/u xray      hypersalivation chronic per daughters started scopolamine patch and suctioning      pt  with  advanced   dementia /     updated daughter at bedside'    Patient hemodynamically stable for discharge home. Follow up with cardiology, neurologist, and vascular surgeon. 83F with PMHx multiple CVAs, bed bound, R>L hemiparesis on aspirin, PAD s/p right AT artery angioplasty SFA pop occlusion without intervention, dementia, HTN, HLD, DM admitted with acute JACQUI CVA.       Acute CVA:   -neurology consulted, continue with aspirin and plavix for 90 days, and then continue on aspirin alone.   -follow up with neurology in 2 weeks  -US carotid no significant stenosis, LDL 52, hga1c 6.1  -PT/OT not candidate because of bedbound status  -discharge home with 24h HHA     HTN/HLD  -continue enalapril, toprol  and  Lipitor    PAD w/ angioplasty  -on asa lower extremities are cool to touch.. last doppler c/w pad in 2019  -check for progression of disease . was on Eliquis back in 2019 per records will need to d/w family what happened   8/9/2021 arterial dopplers c/w PAD.  8/10/2021 d/w outpt vascular doc dr. li ( [id number]) last seen in 2019 no angio performed nor surgical intervention.   pt evaluated by Vascular Surgery Dr. Saravia; no intervention required at this time.

## 2021-08-10 NOTE — DISCHARGE NOTE PROVIDER - CARE PROVIDER_API CALL
Sabas Downey)  Clinical Neurophysiology; Neurology  [address], [address]  [place] [address]  Phone: [phone]  Fax: [phone]  Follow Up Time: 2 weeks    [facility] doctor,   Phone: (   )    -  Fax: (   )    -  Follow Up Time: 2 weeks    Jeferson Mcghee)  Surgery; Vascular Surgery  [address], [address]  [place] [address]  Phone: [phone]  Fax: [phone]  Follow Up Time: 1-3 days

## 2021-08-10 NOTE — DISCHARGE NOTE PROVIDER - PROVIDER TOKENS
PROVIDER:[TOKEN:[[id number]],FOLLOWUP:[2 weeks]],FREE:[LAST:[[facility]],PHONE:[(   )    -],FAX:[(   )    -],FOLLOWUP:[2 weeks]],PROVIDER:[TOKEN:[[id number]],FOLLOWUP:[1-3 days]]

## 2021-08-11 ENCOUNTER — TRANSCRIPTION ENCOUNTER (OUTPATIENT)
Age: 86
End: 2021-08-11

## 2021-08-11 VITALS
SYSTOLIC BLOOD PRESSURE: 154 MMHG | RESPIRATION RATE: 18 BRPM | DIASTOLIC BLOOD PRESSURE: 71 MMHG | OXYGEN SATURATION: 98 % | HEART RATE: 81 BPM | TEMPERATURE: 98 F

## 2021-08-11 LAB
APPEARANCE UR: CLEAR — SIGNIFICANT CHANGE UP
BACTERIA # UR AUTO: ABNORMAL
BILIRUB UR-MCNC: NEGATIVE — SIGNIFICANT CHANGE UP
COLOR SPEC: YELLOW — SIGNIFICANT CHANGE UP
DIFF PNL FLD: ABNORMAL
EPI CELLS # UR: SIGNIFICANT CHANGE UP
GLUCOSE BLDC GLUCOMTR-MCNC: 122 MG/DL — HIGH (ref 70–99)
GLUCOSE BLDC GLUCOMTR-MCNC: 138 MG/DL — HIGH (ref 70–99)
GLUCOSE BLDC GLUCOMTR-MCNC: 143 MG/DL — HIGH (ref 70–99)
GLUCOSE UR QL: NEGATIVE MG/DL — SIGNIFICANT CHANGE UP
KETONES UR-MCNC: NEGATIVE — SIGNIFICANT CHANGE UP
LEUKOCYTE ESTERASE UR-ACNC: ABNORMAL
NITRITE UR-MCNC: NEGATIVE — SIGNIFICANT CHANGE UP
PH UR: 6.5 — SIGNIFICANT CHANGE UP (ref 5–8)
PROT UR-MCNC: NEGATIVE MG/DL — SIGNIFICANT CHANGE UP
RBC CASTS # UR COMP ASSIST: SIGNIFICANT CHANGE UP /HPF (ref 0–4)
SP GR SPEC: 1 — LOW (ref 1.01–1.02)
UROBILINOGEN FLD QL: NEGATIVE MG/DL — SIGNIFICANT CHANGE UP
WBC UR QL: ABNORMAL

## 2021-08-11 PROCEDURE — 99239 HOSP IP/OBS DSCHRG MGMT >30: CPT

## 2021-08-11 RX ADMIN — Medication 1 DROP(S): at 13:46

## 2021-08-11 RX ADMIN — HEPARIN SODIUM 5000 UNIT(S): 5000 INJECTION INTRAVENOUS; SUBCUTANEOUS at 17:04

## 2021-08-11 RX ADMIN — HEPARIN SODIUM 5000 UNIT(S): 5000 INJECTION INTRAVENOUS; SUBCUTANEOUS at 05:37

## 2021-08-11 RX ADMIN — Medication 1 DROP(S): at 05:42

## 2021-08-11 RX ADMIN — Medication 81 MILLIGRAM(S): at 11:23

## 2021-08-11 RX ADMIN — CLOPIDOGREL BISULFATE 75 MILLIGRAM(S): 75 TABLET, FILM COATED ORAL at 11:23

## 2021-08-11 RX ADMIN — Medication 25 MILLIGRAM(S): at 17:04

## 2021-08-11 RX ADMIN — Medication 25 MILLIGRAM(S): at 05:37

## 2021-08-11 RX ADMIN — Medication 10 MILLIGRAM(S): at 05:37

## 2021-08-11 NOTE — CONSULT NOTE ADULT - SUBJECTIVE AND OBJECTIVE BOX
Vascular Attendin-10-21 3 PM i have seen and examined the patient and the legs and both feet are evaluated, H/O noted.  patient with dense right hemiplegia, H/O PAD, and no w has CVA with left sided weakness and no sig carotid stenosis, Arterial duplex was done and showed PAD, I discused with one daughter at te bedside and other daughter by phone and the H/O obtained. Pt had vascualr intervention done by Dr Solis in past. No issues with feet at this time    HPI:  85 yr       pt with h/o   mlple  cva's.   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 1000 milliLiter(s) (100 mL/Hr) IV Continuous <Continuous>  dextrose 5%. 1000 milliLiter(s) (50 mL/Hr) IV Continuous <Continuous>  dextrose 50% Injectable 25 Gram(s) IV Push once  dextrose 50% Injectable 12.5 Gram(s) IV Push once  dextrose 50% Injectable 25 Gram(s) IV Push once  enalapril 10 milliGRAM(s) Oral daily  glucagon  Injectable 1 milliGRAM(s) IntraMuscular once  heparin   Injectable 5000 Unit(s) SubCutaneous every 12 hours  insulin lispro (ADMELOG) corrective regimen sliding scale   SubCutaneous three times a day before meals  metoprolol tartrate 25 milliGRAM(s) Oral two times a day  scopolamine 1 mG/72 Hr(s) Patch 1 Patch Transdermal every 72 hours    MEDICATIONS  (PRN):      Allergies    No Known Allergies    Intolerances        SOCIAL HISTORY:      Vital Signs Last 24 Hrs  T(C): 36.8 (11 Aug 2021 10:58), Max: 37.1 (11 Aug 2021 00:37)  T(F): 98.2 (11 Aug 2021 10:58), Max: 98.7 (11 Aug 2021 00:37)  HR: 83 (11 Aug 2021 10:58) (63 - 96)  BP: 135/77 (11 Aug 2021 10:58) (135/77 - 154/80)  BP(mean): --  RR: 18 (11 Aug 2021 10:58) (18 - 19)  SpO2: 98% (11 Aug 2021 10:58) (97% - 98%)    P/E:- pt is awake, non verbal, canot move right leg or right arm due to CVA,   No cyanosis, both feet with out pulses, but NO ACUTE arterial ischemia.  CAROTIDS:- Bilateral carotids with no Bruits. No scars of previous catheterisation.  UPPER EXTREMITIES:- Bilateral radial artery pulses are normal and no ischemia of the Hands. No edema of the arms.  ABDOMEN:- No pulsatile mass in the abdomen and no ascites.  LOWER EXTREMITIES:- Bilateral LE with No Edema and no CVI, No varicose veins, no ulcers.The arterial pulse are examined with palpation and Dopplers and the findings are as follows,      Pulses:   Right:                                                                          Left:  FEM [ ]2+ [ y]1+ [ ]doppler                                             FEM [ ]2+ [ y]1+ [ ]doppler    POP [ ]2+ [ ]1+ [ y]doppler                                             POP [ ]2+ [y ]1+ [ ]doppler    DP [ ]2+ [ ]1+ [ y]doppler                                                DP [ ]2+ [ ]1+ [y ]doppler  PT[ ]2+ [ ]1+ [ ]doppler                                                  PT [ ]2+ [ ]1+ [ y]doppler      LABS:            Urinalysis Basic - ( 11 Aug 2021 06:12 )    Color: Yellow / Appearance: Clear / S.005 / pH: x  Gluc: x / Ketone: Negative  / Bili: Negative / Urobili: Negative mg/dL   Blood: x / Protein: Negative mg/dL / Nitrite: Negative   Leuk Esterase: Moderate / RBC: 0-2 /HPF / WBC 6-10   Sq Epi: x / Non Sq Epi: Few / Bacteria: Many        RADIOLOGY & ADDITIONAL STUDIES    Impression and Plan: NO need for any vascular w/u at tis time ,ok to f/u with DR solis upon DC.
NEUROLOGY CONSULT    HPI:  86 yo woman admitted after new onset left arm weakness x 1 day, outside windowo for IV-tPA.  Patient is bed and wheelchair bound with dementia, and has history of ischemic strokes in the past with residual bilateral lower extremity weakness and right arm weakness.   Stroke risk factors include age, HTN, HLD, DM.    Head CT: old left JACQUI infarct, old right thalamocapsular infarct, no acute infarct  CTA head/neck: limited study, no LVO, multiple intracranial stenoses, 1.5mm aneurysm at right MI MCA, 4mm fusiform aneurysm at basilar tip    NEUROLOGICAL EXAM:  T 97.6 F  /84  HR 76  MS: Awake, alert, oriented x 1 (not date or location), language: perseverates with echolalia, Follows simple commands, naming impaired.    CN: PERRLA, EOMI, visual fields intact, face symmetric,  +dysarthria, symmetric palatal elevation, tongue midline  Motor: +spastic paraplegia, 3/5 power in left and right upper extremities with increased tone bilaterally  Sensation: unable to assess  Reflexes: 3+ at biceps, brachioradialis, patella, ankle bilaterally.  Extensor plantar response bilaterally.  No clonus  Coordination: unable to assess    NIHSS: 17    Assessment:  86 yo woman with dementia and severe cerebrovascular disease likely related to age and chronic vascular risk factors (HTN, HLD, DM), and prior ischemic strokes with residual bilateral lower extremity weakness and right arm weakness, now presenting with new onset of left arm weakness.       Recommendations:  1. MRI brain (if obtaining MRI is difficulty, can repeat noncontrast head CT as comparative study to initial CT)  2. Carotid US (CTA unable to evaluate carotids well)  3. TTE  4. Antiplatelet therapy: Aspirin 81mg daily  5. Intensive statin therapy: Atorvastatin 80mg daily, target LDL < 70  6. Optimize control of HTN, DM  7. Outpatient stroke/vascular neurology consult to review CTA findings (stenoses, aneurysms)  8. Speech and swallow lloyd Downey MD  [facility] Department of Neurology  [facility]

## 2021-08-11 NOTE — DISCHARGE NOTE NURSING/CASE MANAGEMENT/SOCIAL WORK - PATIENT PORTAL LINK FT
You can access the FollowMyHealth Patient Portal offered by [facility] by registering at the following website: [email or web address]. By joining [facility]’s FollowMyHealth portal, you will also be able to view your health information using other applications (apps) compatible with our system.

## 2021-08-12 ENCOUNTER — NON-APPOINTMENT (OUTPATIENT)
Age: 86
End: 2021-08-12

## 2021-08-12 RX ORDER — CICLOPIROX 80 MG/ML
8 SOLUTION TOPICAL
Qty: 1 | Refills: 3 | Status: COMPLETED | COMMUNITY
Start: 2020-09-17 | End: 2021-08-12

## 2021-08-12 RX ORDER — CHROMIUM 200 MCG
1000 TABLET ORAL DAILY
Qty: 30 | Refills: 0 | Status: COMPLETED | COMMUNITY
Start: 2017-12-21 | End: 2021-08-12

## 2021-08-12 RX ORDER — FLUTICASONE PROPIONATE 50 UG/1
50 SPRAY, METERED NASAL
Qty: 16 | Refills: 3 | Status: COMPLETED | COMMUNITY
Start: 2018-05-31 | End: 2021-08-12

## 2021-08-12 RX ORDER — FLUTICASONE PROPIONATE 50 UG/1
50 SPRAY, METERED NASAL
Qty: 48 | Refills: 0 | Status: COMPLETED | COMMUNITY
Start: 2019-06-19 | End: 2021-08-12

## 2021-08-12 RX ORDER — MONTELUKAST 10 MG/1
10 TABLET, FILM COATED ORAL
Qty: 90 | Refills: 3 | Status: COMPLETED | COMMUNITY
Start: 2019-06-19 | End: 2021-08-12

## 2021-08-16 ENCOUNTER — APPOINTMENT (OUTPATIENT)
Dept: CARE COORDINATION | Facility: HOME HEALTH | Age: 86
End: 2021-08-16
Payer: MEDICARE

## 2021-08-16 ENCOUNTER — NON-APPOINTMENT (OUTPATIENT)
Age: 86
End: 2021-08-16

## 2021-08-16 VITALS
DIASTOLIC BLOOD PRESSURE: 68 MMHG | SYSTOLIC BLOOD PRESSURE: 108 MMHG | RESPIRATION RATE: 14 BRPM | OXYGEN SATURATION: 98 % | HEART RATE: 81 BPM | TEMPERATURE: 97.7 F

## 2021-08-16 PROCEDURE — 99344 HOME/RES VST NEW MOD MDM 60: CPT

## 2021-08-16 NOTE — PHYSICAL EXAM
[No Acute Distress] : no acute distress [Well Nourished] : well nourished [Well Developed] : well developed [Well-Appearing] : well-appearing [Normal Outer Ear/Nose] : the outer ears and nose were normal in appearance [Normal Oropharynx] : the oropharynx was normal [No JVD] : no jugular venous distention [Supple] : supple [No Lymphadenopathy] : no lymphadenopathy [Thyroid Normal, No Nodules] : the thyroid was normal and there were no nodules present [No Respiratory Distress] : no respiratory distress  [Clear to Auscultation] : lungs were clear to auscultation bilaterally [No Accessory Muscle Use] : no accessory muscle use [Normal Rate] : normal rate  [Regular Rhythm] : with a regular rhythm [Normal S1, S2] : normal S1 and S2 [No Murmur] : no murmur heard [No Carotid Bruits] : no carotid bruits [No Abdominal Bruit] : a ~M bruit was not heard ~T in the abdomen [No Varicosities] : no varicosities [Pedal Pulses Present] : the pedal pulses are present [No Edema] : there was no peripheral edema [No Extremity Clubbing/Cyanosis] : no extremity clubbing/cyanosis [No Palpable Aorta] : no palpable aorta [Soft] : abdomen soft [Non Tender] : non-tender [Non-distended] : non-distended [No Masses] : no abdominal mass palpated [No HSM] : no HSM [Normal Bowel Sounds] : normal bowel sounds [Normal Posterior Cervical Nodes] : no posterior cervical lymphadenopathy [Normal Anterior Cervical Nodes] : no anterior cervical lymphadenopathy [No CVA Tenderness] : no CVA  tenderness [No Spinal Tenderness] : no spinal tenderness [Normal Mood] : the mood was normal [de-identified] : right pupil sluggish [de-identified] : right hand weakness, b/l LE weakness [de-identified] : right forearm bruise [de-identified] : dementia, wheelchair bound, right sided weakness

## 2021-08-16 NOTE — HISTORY OF PRESENT ILLNESS
[Post-hospitalization from ___ Hospital] : Post-hospitalization from [unfilled] Hospital [FreeTextEntry3] : completed by RN [FreeTextEntry2] : hospital course:85 yr\par      pt with h/o   mlple  cva's.   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  Her daughter Remy is her primary caregiver and her other daughters are very involved in her care.  Home health care nurse has been to the home and PT is going to be coming to begin therapy.  She has a f/u appt with vascular on Thursday and f/u with neuro on 8/31. The patient is unable to walk or bear any weight.   \par \par

## 2021-08-16 NOTE — REVIEW OF SYSTEMS
[Joint Stiffness] : joint stiffness [Confusion] : confusion [Memory Loss] : memory loss [Unsteady Walking] : ataxia [Easy Bruising] : easy bruising [Negative] : Psychiatric [FreeTextEntry8] : incontinent [FreeTextEntry9] : left shoulder [de-identified] : dementia with confusion at times

## 2021-08-16 NOTE — ASSESSMENT
[FreeTextEntry1] : 1. CVA\par - continue plavix daily\par - continue ASA daily\par - continue atorvastatin\par - f.u neuro on 8/31\par \par 2. hypertension\par - continue metoprolol\par - continue enalapril \par - check VS every morning\par \par 3. dementia\par - being cared for daughter's \par - continue to provide a safe space for the patient\par \par 4. hyperlipidemia\par - continue atorvastatin 80mg daily\par - low cholesterol/heart healthy diet

## 2021-08-18 DIAGNOSIS — I10 ESSENTIAL (PRIMARY) HYPERTENSION: ICD-10-CM

## 2021-08-18 DIAGNOSIS — E78.5 HYPERLIPIDEMIA, UNSPECIFIED: ICD-10-CM

## 2021-08-18 DIAGNOSIS — I63.9 CEREBRAL INFARCTION, UNSPECIFIED: ICD-10-CM

## 2021-08-18 DIAGNOSIS — R53.2 FUNCTIONAL QUADRIPLEGIA: ICD-10-CM

## 2021-08-18 DIAGNOSIS — I63.521 CEREBRAL INFARCTION DUE TO UNSPECIFIED OCCLUSION OR STENOSIS OF RIGHT ANTERIOR CEREBRAL ARTERY: ICD-10-CM

## 2021-08-18 DIAGNOSIS — F03.90 UNSPECIFIED DEMENTIA WITHOUT BEHAVIORAL DISTURBANCE: ICD-10-CM

## 2021-08-18 DIAGNOSIS — Z74.01 BED CONFINEMENT STATUS: ICD-10-CM

## 2021-08-18 DIAGNOSIS — G83.24 MONOPLEGIA OF UPPER LIMB AFFECTING LEFT NONDOMINANT SIDE: ICD-10-CM

## 2021-08-18 DIAGNOSIS — R29.713 NIHSS SCORE 13: ICD-10-CM

## 2021-08-18 DIAGNOSIS — I73.9 PERIPHERAL VASCULAR DISEASE, UNSPECIFIED: ICD-10-CM

## 2021-08-18 DIAGNOSIS — K11.7 DISTURBANCES OF SALIVARY SECRETION: ICD-10-CM

## 2021-08-18 DIAGNOSIS — E11.9 TYPE 2 DIABETES MELLITUS WITHOUT COMPLICATIONS: ICD-10-CM

## 2021-08-23 ENCOUNTER — TRANSCRIPTION ENCOUNTER (OUTPATIENT)
Age: 86
End: 2021-08-23

## 2021-08-24 ENCOUNTER — EMERGENCY (EMERGENCY)
Facility: HOSPITAL | Age: 86
LOS: 0 days | Discharge: HOME | End: 2021-08-24
Attending: STUDENT IN AN ORGANIZED HEALTH CARE EDUCATION/TRAINING PROGRAM
Payer: MEDICARE

## 2021-08-24 ENCOUNTER — TRANSCRIPTION ENCOUNTER (OUTPATIENT)
Age: 86
End: 2021-08-24

## 2021-08-24 VITALS
OXYGEN SATURATION: 97 % | DIASTOLIC BLOOD PRESSURE: 65 MMHG | HEART RATE: 90 BPM | WEIGHT: 119.93 LBS | TEMPERATURE: 98 F | RESPIRATION RATE: 17 BRPM | SYSTOLIC BLOOD PRESSURE: 109 MMHG | HEIGHT: 61 IN

## 2021-08-24 VITALS
HEART RATE: 94 BPM | OXYGEN SATURATION: 99 % | DIASTOLIC BLOOD PRESSURE: 48 MMHG | SYSTOLIC BLOOD PRESSURE: 114 MMHG | RESPIRATION RATE: 22 BRPM

## 2021-08-24 DIAGNOSIS — E11.51 TYPE 2 DIABETES MELLITUS WITH DIABETIC PERIPHERAL ANGIOPATHY WITHOUT GANGRENE: ICD-10-CM

## 2021-08-24 DIAGNOSIS — I95.9 HYPOTENSION, UNSPECIFIED: ICD-10-CM

## 2021-08-24 DIAGNOSIS — Z79.84 LONG TERM (CURRENT) USE OF ORAL HYPOGLYCEMIC DRUGS: ICD-10-CM

## 2021-08-24 DIAGNOSIS — Z98.89 OTHER SPECIFIED POSTPROCEDURAL STATES: Chronic | ICD-10-CM

## 2021-08-24 DIAGNOSIS — I10 ESSENTIAL (PRIMARY) HYPERTENSION: ICD-10-CM

## 2021-08-24 DIAGNOSIS — I69.359 HEMIPLEGIA AND HEMIPARESIS FOLLOWING CEREBRAL INFARCTION AFFECTING UNSPECIFIED SIDE: ICD-10-CM

## 2021-08-24 DIAGNOSIS — I45.10 UNSPECIFIED RIGHT BUNDLE-BRANCH BLOCK: ICD-10-CM

## 2021-08-24 DIAGNOSIS — Z79.02 LONG TERM (CURRENT) USE OF ANTITHROMBOTICS/ANTIPLATELETS: ICD-10-CM

## 2021-08-24 DIAGNOSIS — N39.0 URINARY TRACT INFECTION, SITE NOT SPECIFIED: ICD-10-CM

## 2021-08-24 DIAGNOSIS — I49.3 VENTRICULAR PREMATURE DEPOLARIZATION: ICD-10-CM

## 2021-08-24 DIAGNOSIS — I44.0 ATRIOVENTRICULAR BLOCK, FIRST DEGREE: ICD-10-CM

## 2021-08-24 DIAGNOSIS — Z79.82 LONG TERM (CURRENT) USE OF ASPIRIN: ICD-10-CM

## 2021-08-24 DIAGNOSIS — I69.320 APHASIA FOLLOWING CEREBRAL INFARCTION: ICD-10-CM

## 2021-08-24 LAB
ALBUMIN SERPL ELPH-MCNC: 2.5 G/DL — LOW (ref 3.3–5)
ALP SERPL-CCNC: 53 U/L — SIGNIFICANT CHANGE UP (ref 40–120)
ALT FLD-CCNC: 16 U/L — SIGNIFICANT CHANGE UP (ref 12–78)
ANION GAP SERPL CALC-SCNC: 6 MMOL/L — SIGNIFICANT CHANGE UP (ref 5–17)
APPEARANCE UR: ABNORMAL
AST SERPL-CCNC: 19 U/L — SIGNIFICANT CHANGE UP (ref 15–37)
BACTERIA # UR AUTO: ABNORMAL
BASOPHILS # BLD AUTO: 0.07 K/UL — SIGNIFICANT CHANGE UP (ref 0–0.2)
BASOPHILS NFR BLD AUTO: 0.6 % — SIGNIFICANT CHANGE UP (ref 0–2)
BILIRUB SERPL-MCNC: 0.6 MG/DL — SIGNIFICANT CHANGE UP (ref 0.2–1.2)
BILIRUB UR-MCNC: NEGATIVE — SIGNIFICANT CHANGE UP
BUN SERPL-MCNC: 22 MG/DL — SIGNIFICANT CHANGE UP (ref 7–23)
CALCIUM SERPL-MCNC: 7.8 MG/DL — LOW (ref 8.5–10.1)
CHLORIDE SERPL-SCNC: 105 MMOL/L — SIGNIFICANT CHANGE UP (ref 96–108)
CO2 SERPL-SCNC: 23 MMOL/L — SIGNIFICANT CHANGE UP (ref 22–31)
COLOR SPEC: ABNORMAL
COMMENT - URINE: SIGNIFICANT CHANGE UP
CREAT SERPL-MCNC: 1.32 MG/DL — HIGH (ref 0.5–1.3)
DIFF PNL FLD: ABNORMAL
EOSINOPHIL # BLD AUTO: 0.2 K/UL — SIGNIFICANT CHANGE UP (ref 0–0.5)
EOSINOPHIL NFR BLD AUTO: 1.8 % — SIGNIFICANT CHANGE UP (ref 0–6)
EPI CELLS # UR: SIGNIFICANT CHANGE UP
GLUCOSE BLDC GLUCOMTR-MCNC: 115 MG/DL — HIGH (ref 70–99)
GLUCOSE SERPL-MCNC: 125 MG/DL — HIGH (ref 70–99)
GLUCOSE UR QL: NEGATIVE MG/DL — SIGNIFICANT CHANGE UP
GRAN CASTS # UR COMP ASSIST: ABNORMAL /LPF
HCT VFR BLD CALC: 41.2 % — SIGNIFICANT CHANGE UP (ref 34.5–45)
HGB BLD-MCNC: 13.3 G/DL — SIGNIFICANT CHANGE UP (ref 11.5–15.5)
IMM GRANULOCYTES NFR BLD AUTO: 0.4 % — SIGNIFICANT CHANGE UP (ref 0–1.5)
KETONES UR-MCNC: ABNORMAL
LEUKOCYTE ESTERASE UR-ACNC: ABNORMAL
LYMPHOCYTES # BLD AUTO: 28.2 % — SIGNIFICANT CHANGE UP (ref 13–44)
LYMPHOCYTES # BLD AUTO: 3.2 K/UL — SIGNIFICANT CHANGE UP (ref 1–3.3)
MCHC RBC-ENTMCNC: 29.8 PG — SIGNIFICANT CHANGE UP (ref 27–34)
MCHC RBC-ENTMCNC: 32.3 GM/DL — SIGNIFICANT CHANGE UP (ref 32–36)
MCV RBC AUTO: 92.4 FL — SIGNIFICANT CHANGE UP (ref 80–100)
MONOCYTES # BLD AUTO: 0.91 K/UL — HIGH (ref 0–0.9)
MONOCYTES NFR BLD AUTO: 8 % — SIGNIFICANT CHANGE UP (ref 2–14)
NEUTROPHILS # BLD AUTO: 6.92 K/UL — SIGNIFICANT CHANGE UP (ref 1.8–7.4)
NEUTROPHILS NFR BLD AUTO: 61 % — SIGNIFICANT CHANGE UP (ref 43–77)
NITRITE UR-MCNC: NEGATIVE — SIGNIFICANT CHANGE UP
NRBC # BLD: 0 /100 WBCS — SIGNIFICANT CHANGE UP (ref 0–0)
PH UR: 8 — SIGNIFICANT CHANGE UP (ref 5–8)
PLATELET # BLD AUTO: 271 K/UL — SIGNIFICANT CHANGE UP (ref 150–400)
POTASSIUM SERPL-MCNC: 4.1 MMOL/L — SIGNIFICANT CHANGE UP (ref 3.5–5.3)
POTASSIUM SERPL-SCNC: 4.1 MMOL/L — SIGNIFICANT CHANGE UP (ref 3.5–5.3)
PROT SERPL-MCNC: 6.2 GM/DL — SIGNIFICANT CHANGE UP (ref 6–8.3)
PROT UR-MCNC: 100 MG/DL
RBC # BLD: 4.46 M/UL — SIGNIFICANT CHANGE UP (ref 3.8–5.2)
RBC # FLD: 13.5 % — SIGNIFICANT CHANGE UP (ref 10.3–14.5)
RBC CASTS # UR COMP ASSIST: ABNORMAL /HPF (ref 0–4)
SODIUM SERPL-SCNC: 134 MMOL/L — LOW (ref 135–145)
SP GR SPEC: 1.01 — SIGNIFICANT CHANGE UP (ref 1.01–1.02)
UROBILINOGEN FLD QL: 4 MG/DL
WBC # BLD: 11.34 K/UL — HIGH (ref 3.8–10.5)
WBC # FLD AUTO: 11.34 K/UL — HIGH (ref 3.8–10.5)
WBC UR QL: >50

## 2021-08-24 PROCEDURE — 99284 EMERGENCY DEPT VISIT MOD MDM: CPT

## 2021-08-24 PROCEDURE — 71045 X-RAY EXAM CHEST 1 VIEW: CPT | Mod: 26

## 2021-08-24 PROCEDURE — 93010 ELECTROCARDIOGRAM REPORT: CPT

## 2021-08-24 RX ORDER — CEFTRIAXONE 500 MG/1
1000 INJECTION, POWDER, FOR SOLUTION INTRAMUSCULAR; INTRAVENOUS ONCE
Refills: 0 | Status: COMPLETED | OUTPATIENT
Start: 2021-08-24 | End: 2021-08-24

## 2021-08-24 RX ORDER — SODIUM CHLORIDE 9 MG/ML
1000 INJECTION INTRAMUSCULAR; INTRAVENOUS; SUBCUTANEOUS ONCE
Refills: 0 | Status: COMPLETED | OUTPATIENT
Start: 2021-08-24 | End: 2021-08-24

## 2021-08-24 RX ORDER — CEPHALEXIN 500 MG
1 CAPSULE ORAL
Qty: 14 | Refills: 0
Start: 2021-08-24 | End: 2021-08-30

## 2021-08-24 RX ADMIN — SODIUM CHLORIDE 1000 MILLILITER(S): 9 INJECTION INTRAMUSCULAR; INTRAVENOUS; SUBCUTANEOUS at 07:52

## 2021-08-24 RX ADMIN — CEFTRIAXONE 100 MILLIGRAM(S): 500 INJECTION, POWDER, FOR SOLUTION INTRAMUSCULAR; INTRAVENOUS at 08:55

## 2021-08-24 RX ADMIN — SODIUM CHLORIDE 1000 MILLILITER(S): 9 INJECTION INTRAMUSCULAR; INTRAVENOUS; SUBCUTANEOUS at 08:56

## 2021-08-24 NOTE — ED ADULT NURSE NOTE - COVID-19 RESULT
Date of Service: 06/21/2019    SUBJECTIVE:  A 66-year-old diabetic woman with some soreness in her right foot for the past week.  No injury that she knows of.  She is not currently under the care of podiatry.  Diabetic control has been less than ideal.  Her last A1c just recently was 12.0.    PHYSICAL EXAMINATION:  SKIN:  She has an area at the base of her second and third toes with a whitish fluid collection and some tenderness, although surprisingly little erythema.  There does not seem to be a foreign body present.    ASSESSMENT AND PLAN:    The patient  appears to have an abscess in her distal right foot with the possibility of foreign body.  We made arrangements for her to see a podiatrist this afternoon for further assessment and treatment.  She has a followup with her endocrinologist soon, but a lot of work is needed on her diabetes.      Dictated By: Suhail Wilcox MD  Signing Provider: MD GIOVANY Bruno/alfreidto ([id number])  DD: 06/21/2019 13:13:24 TD: 06/24/2019 05:11:47    Copy Sent To:    NEGATIVE

## 2021-08-24 NOTE — ED ADULT NURSE NOTE - NSIMPLEMENTINTERV_GEN_ALL_ED
Implemented All Fall with Harm Risk Interventions:  [place] to call system. Call bell, personal items and telephone within reach. Instruct patient to call for assistance. Room bathroom lighting operational. Non-slip footwear when patient is off stretcher. Physically safe environment: no spills, clutter or unnecessary equipment. Stretcher in lowest position, wheels locked, appropriate side rails in place. Provide visual cue, wrist band, yellow gown, etc. Monitor gait and stability. Monitor for mental status changes and reorient to person, place, and time. Review medications for side effects contributing to fall risk. Reinforce activity limits and safety measures with patient and family. Provide visual clues: red socks.

## 2021-08-24 NOTE — ED PROVIDER NOTE - CLINICAL SUMMARY MEDICAL DECISION MAKING FREE TEXT BOX
87yo F w/ PMH HTN, DM, PVD, multiple CVA (last 2wks ago w/ residual aphasia, unilateral weakness) BIB daughter d/t hypotension x 2 days. AFVSS. Well appearing, in NAD. Plan: Obtain ECG, FS. CBC, CMP, UA/C, CXR. Give IVF. Re-eval. 87yo F w/ PMH HTN, DM, PVD, multiple CVA (last 2wks ago w/ residual aphasia, unilateral weakness) BIB daughter d/t hypotension x 2 days. AFVSS. Well appearing, in NAD. Plan: Obtain ECG, FS. CBC, CMP, UA/C, CXR. Give IVF. Re-eval. CBC, CMP w/o significant abnormalities, CXR w/o acute pathology, UA w/ + evidence infection. ECG NSR, FS WNL. On re-eval, resting comfortably. Stable for d/c home. Given script for Keflex. Pt has PCP f/u scheduled for Thursday. Given and recommend outpatient f/u w/ Uro. Return signs / symptoms d/w pt and daughter. They understand and agree w/ this plan.

## 2021-08-24 NOTE — ED PROVIDER NOTE - PATIENT PORTAL LINK FT
You can access the FollowMyHealth Patient Portal offered by [facility] by registering at the following website: [email or web address]. By joining [facility]’s FollowMyHealth portal, you will also be able to view your health information using other applications (apps) compatible with our system.

## 2021-08-24 NOTE — ED ADULT TRIAGE NOTE - DATE OF LAST VACCINATION
16-Apr-2021 Yes Yes/Patient registered and referred to the [place] [facility]. Phone appointment scheduled for 5/30/19 at 0900.

## 2021-08-24 NOTE — ED ADULT NURSE NOTE - BEFAST ARM SIDE DRIFT
Chief Complaint   Patient presents with   • Warts     (L) Foot        HISTORY OF PRESENT ILLNESS: Patient is a 40 y.o. male established patient who presents today to discuss the following issues:    Need for vaccination  Due for Tdap.    Plantar wart  Has a wart on the ball of his left foot.  It is sore to walk on and stand on.  He used duct tape briefly and noticed a little improvement.  Discussed options, and he would like to proceed with cryotherapy.  Understands risks vs benefits.       Patient Active Problem List    Diagnosis Date Noted   • Descending aortic aneurysm (HCC) 11/01/2015     Priority: High   • Need for vaccination 07/08/2019   • Plantar wart 07/08/2019   • Mixed hyperlipidemia 07/11/2018       Allergies:Patient has no known allergies.    Current Outpatient Prescriptions   Medication Sig Dispense Refill   • Omega-3 Fatty Acids (FISH OIL PO) Take  by mouth.     • metoprolol SR (TOPROL XL) 25 MG TABLET SR 24 HR TAKE 1/2 TABLET BY MOUTH EVERY DAY 90 Tab 3   • Cholecalciferol (VITAMIN D) 2000 units Cap Take  by mouth.     • losartan (COZAAR) 25 MG Tab TAKE 1 TABLET BY MOUTH EVERY DAY 90 Tab 3   • rosuvastatin (CRESTOR) 5 MG Tab Take 1 Tab by mouth every evening. 30 Tab 11   • therapeutic multivitamin-minerals (THERAGRAN-M) Tab Take 1 Tab by mouth every day.     • aspirin (ASA) 81 MG Chew Tab chewable tablet Take 1 Tab by mouth every day. 100 Tab 11   • amoxicillin (AMOXIL) 500 MG Cap DNC  0   • benzonatate (TESSALON) 100 MG Cap Take 1 Cap by mouth 3 times a day as needed for Cough. (Patient not taking: Reported on 7/8/2019) 60 Cap 0     No current facility-administered medications for this visit.        Social History   Substance Use Topics   • Smoking status: Never Smoker   • Smokeless tobacco: Never Used   • Alcohol use No       No family status information on file.   History reviewed. No pertinent family history.    Review of Systems:   Constitutional: Negative for fever, chills, weight loss and  "malaise/fatigue.   HENT: Negative for ear pain, nosebleeds, congestion, sore throat and neck pain.    Eyes: Negative for blurred vision.   Respiratory: Negative for cough, sputum production, shortness of breath and wheezing.    Cardiovascular: Negative for chest pain, palpitations, orthopnea and leg swelling.   Gastrointestinal: Negative for heartburn, nausea, vomiting and abdominal pain.   Genitourinary: Negative for dysuria, urgency and frequency.   Musculoskeletal: Negative for myalgias, joint pain, and back pain.  Skin: Negative for rash and itching. Positive for wart left foot.  Neurological: Negative for dizziness, tingling, tremors, sensory change, focal weakness and headaches.   Endo/Heme/Allergies: Does not bruise/bleed easily.   Psychiatric/Behavioral: Negative for depression, suicidal ideas and memory loss.  The patient is not nervous/anxious and does not have insomnia.    All other systems reviewed and are negative except as in HPI.    Exam:  /70   Pulse 66   Temp 37 °C (98.6 °F)   Resp 16   Ht 1.702 m (5' 7\")   Wt 76.7 kg (169 lb)   SpO2 95%   General:  Well nourished, well developed male in NAD  Head: Grossly normal.  Neck: Supple without JVD or bruit. Thyroid is not enlarged.  Pulmonary: Clear to ausculation. Normal effort. No rales, ronchi, or wheezing.  Cardiovascular: Regular rate and rhythm without murmur.   Abdomen:  Soft, nontender, nondistended.  Extremities: No clubbing, cyanosis, or edema.  Skin: Intact with no obvious rashes or lesions. Wart left foot pared and frozen with 3 freeze/thaw cycles of liquid nitrogen.  Patient tolerated well.  Neuro: Grossly intact.  Psych: Alert and oriented x 3.  Mood and affect appropriate.    Medical decision-making and discussion: Ho is here today to discuss a wart.  His wart was frozen and he was given a Tdap.  He will follow-up here as needed.    I have placed the below orders and discussed them with an approved delegating provider. The MA is " performing the below orders under the direction of Dr. Catalan, who have provided verbal consent for supervision.            Assessment/Plan:  1. Need for vaccination  TDAP VACCINE =>8YO IM   2. Plantar wart         Return if symptoms worsen or fail to improve.    Please note that this dictation was created using voice recognition software. I have made every reasonable attempt to correct obvious errors, but I expect that there are errors of grammar and possibly content that I did not discover before finalizing the note.   No

## 2021-08-24 NOTE — ED ADULT NURSE NOTE - OBJECTIVE STATEMENT
Assisting primary RN. Received patient bed 7. Per daughter pt hypotensive at home, denies any complaints. Pt with hx of stroke 2 weeks ago with expressive aphasia and paraplegic from previous stroke Assisting primary RN. Received patient bed 7. Per daughter pt hypotensive at home, denies any complaints. Pt with hx of stroke 2 weeks ago with expressive aphasia and paraplegic from previous stroke. 85yo F w/ PMH HTN, DM, PVD, multiple CVA (last 2wks ago w/ residual aphasia, unilateral weakness) BIB daughter d/t hypotension. Pt noted lower than usual BP yesterday, called PCP who advised hold pt BP meds today (metoprolol, enalapril); however, BP remained low this AM (80/50 per daughter). Daughter reports pt slower to recover s/p last CVA (d/c home 8/9 s/p 3 day admission), decreased speech / ability to feed herself / independence. Pt has HHA 24/7, lives at home w/ daughter. Pt is poorly mobile x 6yrs. Daughter reports patient started on Plavix, Atorvastatin increased to 80mg on d/c home. Denies fever, vomiting, diarrhea, foul smelling urine. Pt w/o complaints.

## 2021-08-24 NOTE — ED PROVIDER NOTE - CARE PROVIDER_API CALL
Sofia Byrd)  Urology  [address] [place] [address]  Phone: [phone]  Fax: [phone]  Follow Up Time: 7-10 Days

## 2021-08-24 NOTE — ED PROVIDER NOTE - OBJECTIVE STATEMENT
85yo F w/ PMH HTN, DM, PVD, multiple CVA (last 2wks ago w/ residual aphasia, unilateral weakness) BIB daughter d/t hypotension. Pt noted lower than usual BP yesterday, called PCP who advised hold pt BP meds today (metoprolol, enalapril); however, BP remained low this AM (80/50 per daughter). Daughter reports pt slower to recover s/p last CVA (d/c home 8/9 s/p 3 day admission), decreased speech / ability to feed herself / independence. Pt has HHA 24/7, lives at home w/ daughter. Pt is poorly mobile x 6yrs. Daughter reports patient started on Plavix, Atorvastatin increased to 80mg on d/c home. Denies fever, vomiting, diarrhea, foul smelling urine. Pt w/o complaints.     PMH as above, PSH none, NKDA, meds as listed.

## 2021-08-24 NOTE — ED ADULT TRIAGE NOTE - CHIEF COMPLAINT QUOTE
Per daughter pt hypotensive at home, denies any complaints. Pt with hx of stroke 2 weeks ago with expressive aphasia and paraplegic from previous stroke

## 2021-08-25 ENCOUNTER — NON-APPOINTMENT (OUTPATIENT)
Age: 86
End: 2021-08-25

## 2021-08-26 ENCOUNTER — APPOINTMENT (OUTPATIENT)
Dept: FAMILY MEDICINE | Facility: CLINIC | Age: 86
End: 2021-08-26
Payer: MEDICARE

## 2021-08-26 VITALS
SYSTOLIC BLOOD PRESSURE: 110 MMHG | HEART RATE: 55 BPM | TEMPERATURE: 97.7 F | WEIGHT: 120 LBS | RESPIRATION RATE: 14 BRPM | BODY MASS INDEX: 22.08 KG/M2 | OXYGEN SATURATION: 99 % | DIASTOLIC BLOOD PRESSURE: 70 MMHG | HEIGHT: 62 IN

## 2021-08-26 DIAGNOSIS — E78.5 HYPERLIPIDEMIA, UNSPECIFIED: ICD-10-CM

## 2021-08-26 DIAGNOSIS — R13.10 DYSPHAGIA, UNSPECIFIED: ICD-10-CM

## 2021-08-26 DIAGNOSIS — I25.10 ATHEROSCLEROTIC HEART DISEASE OF NATIVE CORONARY ARTERY W/OUT ANGINA PECTORIS: ICD-10-CM

## 2021-08-26 LAB
-  AMIKACIN: SIGNIFICANT CHANGE UP
-  AMIKACIN: SIGNIFICANT CHANGE UP
-  AMOXICILLIN/CLAVULANIC ACID: SIGNIFICANT CHANGE UP
-  AMOXICILLIN/CLAVULANIC ACID: SIGNIFICANT CHANGE UP
-  AMPICILLIN/SULBACTAM: SIGNIFICANT CHANGE UP
-  AMPICILLIN/SULBACTAM: SIGNIFICANT CHANGE UP
-  AMPICILLIN: SIGNIFICANT CHANGE UP
-  AMPICILLIN: SIGNIFICANT CHANGE UP
-  AZTREONAM: SIGNIFICANT CHANGE UP
-  AZTREONAM: SIGNIFICANT CHANGE UP
-  CEFAZOLIN: SIGNIFICANT CHANGE UP
-  CEFAZOLIN: SIGNIFICANT CHANGE UP
-  CEFEPIME: SIGNIFICANT CHANGE UP
-  CEFEPIME: SIGNIFICANT CHANGE UP
-  CEFOXITIN: SIGNIFICANT CHANGE UP
-  CEFOXITIN: SIGNIFICANT CHANGE UP
-  CEFTRIAXONE: SIGNIFICANT CHANGE UP
-  CEFTRIAXONE: SIGNIFICANT CHANGE UP
-  CIPROFLOXACIN: SIGNIFICANT CHANGE UP
-  CIPROFLOXACIN: SIGNIFICANT CHANGE UP
-  ERTAPENEM: SIGNIFICANT CHANGE UP
-  ERTAPENEM: SIGNIFICANT CHANGE UP
-  GENTAMICIN: SIGNIFICANT CHANGE UP
-  GENTAMICIN: SIGNIFICANT CHANGE UP
-  IMIPENEM: SIGNIFICANT CHANGE UP
-  IMIPENEM: SIGNIFICANT CHANGE UP
-  LEVOFLOXACIN: SIGNIFICANT CHANGE UP
-  LEVOFLOXACIN: SIGNIFICANT CHANGE UP
-  MEROPENEM: SIGNIFICANT CHANGE UP
-  MEROPENEM: SIGNIFICANT CHANGE UP
-  NITROFURANTOIN: SIGNIFICANT CHANGE UP
-  NITROFURANTOIN: SIGNIFICANT CHANGE UP
-  PIPERACILLIN/TAZOBACTAM: SIGNIFICANT CHANGE UP
-  PIPERACILLIN/TAZOBACTAM: SIGNIFICANT CHANGE UP
-  TIGECYCLINE: SIGNIFICANT CHANGE UP
-  TIGECYCLINE: SIGNIFICANT CHANGE UP
-  TOBRAMYCIN: SIGNIFICANT CHANGE UP
-  TOBRAMYCIN: SIGNIFICANT CHANGE UP
-  TRIMETHOPRIM/SULFAMETHOXAZOLE: SIGNIFICANT CHANGE UP
-  TRIMETHOPRIM/SULFAMETHOXAZOLE: SIGNIFICANT CHANGE UP
CULTURE RESULTS: SIGNIFICANT CHANGE UP
METHOD TYPE: SIGNIFICANT CHANGE UP
METHOD TYPE: SIGNIFICANT CHANGE UP
ORGANISM # SPEC MICROSCOPIC CNT: SIGNIFICANT CHANGE UP
SPECIMEN SOURCE: SIGNIFICANT CHANGE UP

## 2021-08-26 PROCEDURE — 99495 TRANSJ CARE MGMT MOD F2F 14D: CPT

## 2021-08-26 RX ORDER — ATORVASTATIN CALCIUM 80 MG/1
80 TABLET, FILM COATED ORAL AT BEDTIME
Refills: 0 | Status: DISCONTINUED | COMMUNITY
End: 2021-08-26

## 2021-08-26 RX ORDER — METOPROLOL TARTRATE 25 MG/1
25 TABLET, FILM COATED ORAL TWICE DAILY
Refills: 0 | Status: DISCONTINUED | COMMUNITY
Start: 2021-08-12 | End: 2021-08-26

## 2021-08-27 NOTE — HISTORY OF PRESENT ILLNESS
[Post-hospitalization from ___ Hospital] : Post-hospitalization from [unfilled] Hospital [Admitted on: ___] : The patient was admitted on [unfilled] [Discharged on ___] : discharged on [unfilled] [Discharge Summary] : discharge summary [Pertinent Labs] : pertinent labs [Radiology Findings] : radiology findings [Discharge Med List] : discharge medication list [Med Reconciliation] : medication reconciliation has been completed [Patient Contacted By: ____] : and contacted by [unfilled] [FreeTextEntry2] : Here for hospital f/u after Stroke\par 8/7-8/11/21.\par  Here with aidricki Becerra.\par 85 y/o female  patient who is wheelchair and bed bound  with h/o multiple CVA's with right sided weakness was treated in  [facility] for rigth hemispheric stroke  with left sided weakness in arm. She has moderate dementia with sun downing.Due to diffuse severe atherosclerotic disease and poor baseline, she was deemed not a candidate for Thrombectomy.She will f/u neurology and saw dr. Munoz Vascular doctor  last week. Patient is unable to walk or bear any weight. \par  She was in ER this Tuesday  for treat and release for low BP 80-90/60.She\par was later found to have UTI. She is taking antibiotic Keflex.Her BP meds were held for last couple days . Home Bp today was 16, Blood sugar 102 and TN 84.Bp in office is normal. She has trouble swallowing big pills.\par She has upper dentures and \par has hard time chewing and spitting up food.Daughter is\par requesting referral to nutritionist.\par  She is looking for recommendation to urologist. \par \par \par

## 2021-08-27 NOTE — PHYSICAL EXAM
[Well Nourished] : well nourished [Normal Sclera/Conjunctiva] : normal sclera/conjunctiva [EOMI] : extraocular movements intact [Supple] : supple [Normal Outer Ear/Nose] : the outer ears and nose were normal in appearance [No Respiratory Distress] : no respiratory distress  [Normal Rate] : normal rate  [Regular Rhythm] : with a regular rhythm [Normal S1, S2] : normal S1 and S2 [No Varicosities] : no varicosities [No Edema] : there was no peripheral edema [Soft] : abdomen soft [Non Tender] : non-tender [Non-distended] : non-distended [Normal Bowel Sounds] : normal bowel sounds [Normal Posterior Cervical Nodes] : no posterior cervical lymphadenopathy [Normal Anterior Cervical Nodes] : no anterior cervical lymphadenopathy [Normal Mood] : the mood was normal [de-identified] : murmur at sternal border  [de-identified] : urine incontinence, wears diapers [de-identified] : weakness of extremities more on left UE [64454 - Moderate Complexity requires multiple possible diagnoses and/or the management options, moderate complexity of the medical data (tests, etc.) to be reviewed, and moderate risk of significant complications, morbidity, and/or mortality as well as co] : Moderate Complexity [de-identified] :  uses wheelchair

## 2021-08-27 NOTE — PLAN
[FreeTextEntry1] : AS, plavix for 90 days then\par ASA alone.\par , STATIN .\par PAD w. angioplasty: f/u treatment per vascular. \par HTN: monitor BP .\par dementia: continue current supportive care.\par HLD: Atorvastatin 80 mg daily. Low chol. diet. \par  She needs script for battery powered patient with manual low base and full body sling(alana lift),\par shower niyah lightweight \par foldable roll-in\par shower and bath chair with commode\par and coccyx foam for off loading while sitting in wheelchair.

## 2021-08-27 NOTE — REVIEW OF SYSTEMS
[Dysuria] : no dysuria [Incontinence] : incontinence [Nocturia] : no nocturia [Poor Libido] : libido not poor [Hematuria] : no hematuria [Frequency] : no frequency [Vaginal Discharge] : no vaginal discharge [Dysmenorrhea] : no dysmenorrhea [Headache] : no headache [Unsteady Walking] : ataxia [Negative] : Heme/Lymph [de-identified] : wheel chair dependent

## 2021-08-31 ENCOUNTER — APPOINTMENT (OUTPATIENT)
Dept: NEUROLOGY | Facility: CLINIC | Age: 86
End: 2021-08-31
Payer: MEDICARE

## 2021-09-02 ENCOUNTER — NON-APPOINTMENT (OUTPATIENT)
Age: 86
End: 2021-09-02

## 2021-09-07 ENCOUNTER — APPOINTMENT (OUTPATIENT)
Dept: NEUROLOGY | Facility: CLINIC | Age: 86
End: 2021-09-07
Payer: MEDICARE

## 2021-09-07 PROCEDURE — 99215 OFFICE O/P EST HI 40 MIN: CPT

## 2021-09-10 ENCOUNTER — NON-APPOINTMENT (OUTPATIENT)
Age: 86
End: 2021-09-10

## 2021-09-11 ENCOUNTER — TRANSCRIPTION ENCOUNTER (OUTPATIENT)
Age: 86
End: 2021-09-11

## 2021-09-13 ENCOUNTER — NON-APPOINTMENT (OUTPATIENT)
Age: 86
End: 2021-09-13

## 2021-09-14 ENCOUNTER — APPOINTMENT (OUTPATIENT)
Dept: UROLOGY | Facility: CLINIC | Age: 86
End: 2021-09-14
Payer: MEDICARE

## 2021-09-14 ENCOUNTER — NON-APPOINTMENT (OUTPATIENT)
Age: 86
End: 2021-09-14

## 2021-09-14 VITALS — TEMPERATURE: 97.3 F

## 2021-09-14 PROCEDURE — 99204 OFFICE O/P NEW MOD 45 MIN: CPT

## 2021-09-14 NOTE — REVIEW OF SYSTEMS
[Feeling Tired] : feeling tired [Recent Weight Loss (___ Lbs)] : recent [unfilled] ~Ulb weight loss [Urine Infection (bladder/kidney)] : bladder/kidney infection [Blood in urine that you can see] : blood visible in urine [Urine retention] : urine retention [Limb Weakness] : limb weakness [Difficulty Walking] : difficulty walking [Feelings Of Weakness] : feelings of weakness [Easy Bleeding] : a tendency for easy bleeding [Easy Bruising] : a tendency for easy bruising [Negative] : Psychiatric

## 2021-09-14 NOTE — ASSESSMENT
[FreeTextEntry1] : Ms. Caldwell is an 86 y.o. F with a history of stroke, wheel chair bound, and UTIs who presents today with concerns for an active UTI.\par \par Her current symptoms include increased lethargy for the past 1.5 weeks additionally, she had some brown urine approximately 2 days ago. It is hard to know if she has any local symptoms, including dysuria or suprapubic pain given that she is minimally interactive and may not be able to express this. I discussed with Ms. Caldwell and her family that mental status changes in the setting of bacteriuria can pose a difficult challenge.  There may be many causes of acute mental status changes, and when they occurs in the setting of a positive urine culture it is attributed to a urinary tract infection. I discussed that if she has bacteruria in the setting of AMS, we must always weigh the risks of developing sepsis with the risk of overtreatment and breeding bacterial resistance.\par \par If this is a UTI, it is unclear if this represents a recurrence or reinfection. She is incontinent, and I catheterized her for about ~75cc - she likely does not empty fully.\par \par During the clinic visit, I had a discussion regarding the standard of care for diagnosis and treatment of recurrent UTIs. In addition, we discussed the next steps in management.  \par \par The patient is aware of the standard of care when diagnosing recurrent UTIs, which includes the following:  There are many causes of recurrent UTIs including bowel and bladder dysfunction, sexual activity, urolithiasis, foreign bodies, anatomic abnormalities, reflux, immunosuppression, poor emptying. The patient's specific risk factors are listed above in my note. I explained that it is important to obtain urine cultures to document the bacterial strain and frequency of UTIs. In patients that have risk factors for stones, upper tract imaging is used. In some women, a pelvic exam can be useful to rule out abnormalities such as a cystocele or urethral diverticulum. \par \par In addition, the patient is aware of standard of care when treating recurrent UTIs, which includes the following: Continuous antibiotic prophylaxis is supported by several large studies that show that antibiotic prophylaxis decreases recurrences by up to 95 percent compared with placebo or with patients' prior experience. Postcoital prophylaxis (a single postcoital dose) may be a more efficient and acceptable method of prevention than continuous prophylaxis in women whose UTIs appear to be temporally related to sexual intercourse. Topical estrogen (for postmenopausal women only) not taking oral estrogen who have three or more recurrent UTIs per year. While there is limited data on using cranberry products, there is little harm from using cranberry products. While probiotics, cranberry products, D-mannose and methenamine hippurate have a credible scientific basis, additional clinical trials need to be performed before they can be widely used. However, some patients do very well with these medications. \par \par Based on the above discussion, we elected to move forward with the following diagnostic tests as the next step:\par \par - Catheterized urine culture \par - Pelvic exam performed\par - Renal US\par \par In addition, we elected to move forward with the following treatment plan as the immediate next step:\par \par - Collect urine cultures and call us when there is a suspected urinary tract infection\par - Continue vaginal estrogen\par - Continue ciprofloxacin as prescribed. I will call if the antibiotic needs to be changed\par - Continue cranberry supplementation\par - Once renal / bladder US are completed, we can discuss the utility of performing intermittent CIC once per day to completely empty the bladder to decrease the risk of recurrent, symptomatic UTIs.

## 2021-09-14 NOTE — HISTORY OF PRESENT ILLNESS
[FreeTextEntry1] : ELLA CALDWELL is a 86 year F who presents today as a new patient evaluation for UTIs. History obtained from patient's daughter.\par \par Ms. Caldwell suffered a stroke in 2015 with right-sided weakness, but again recently suffered another stroke on August 9 that has left her severely debilitated.  She is mostly wheelchair-bound, in order to transfer, she needs to be carried.  She has an aid.\par \par She was recently seen in the emergency department, and was diagnosed with a urinary tract infection. Urine culture resulted in 100,000 colony-forming units of E. coli and klebsiella. She was initiated on keflex.\par \par She has been at her baseline, but for the past week and a half, her daughter has noticed that she is less conversant, and more lethargic.  She appears more confused.  She is not endorsing any pain.  She is incontinent at baseline, but they did notice that for the past several days, her urine was more brown/red.  It is currently yellow now. She has a BM every 3 days.\par \par She has had urinary tract infections in the past, the last documented one was in December 2018.  She is on cranberry oral supplementation as well as vaginal estrogen, but they are not sure when this was prescribed. They also reports that she does not empty her bladder well, and every 6 months would see a nephrologist for "bladder flushing ".  Upon further probing, it appears that she was initiated on a brief course of continuous bladder irrigation to "wash the bladder ".\par \par Urine culture 8/24/2021 - 100k CFU E. coli + Klebsiella\par Urine Culture 12/2018 - 100k CFU e. coli\par \par

## 2021-09-15 ENCOUNTER — APPOINTMENT (OUTPATIENT)
Dept: SPEECH THERAPY | Facility: HOSPITAL | Age: 86
End: 2021-09-15
Payer: MEDICARE

## 2021-09-15 ENCOUNTER — APPOINTMENT (OUTPATIENT)
Dept: RADIOLOGY | Facility: HOSPITAL | Age: 86
End: 2021-09-15
Payer: MEDICARE

## 2021-09-15 ENCOUNTER — OUTPATIENT (OUTPATIENT)
Dept: OUTPATIENT SERVICES | Facility: HOSPITAL | Age: 86
LOS: 1 days | Discharge: HOME | End: 2021-09-15

## 2021-09-15 ENCOUNTER — TRANSCRIPTION ENCOUNTER (OUTPATIENT)
Age: 86
End: 2021-09-15

## 2021-09-15 ENCOUNTER — OUTPATIENT (OUTPATIENT)
Dept: OUTPATIENT SERVICES | Facility: HOSPITAL | Age: 86
LOS: 1 days | End: 2021-09-15

## 2021-09-15 DIAGNOSIS — I63.9 CEREBRAL INFARCTION, UNSPECIFIED: ICD-10-CM

## 2021-09-15 DIAGNOSIS — Z98.89 OTHER SPECIFIED POSTPROCEDURAL STATES: Chronic | ICD-10-CM

## 2021-09-15 LAB
APPEARANCE: ABNORMAL
BACTERIA: NEGATIVE
BILIRUBIN URINE: NEGATIVE
BLOOD URINE: ABNORMAL
COLOR: YELLOW
GLUCOSE QUALITATIVE U: NEGATIVE
HYALINE CASTS: 0 /LPF
KETONES URINE: ABNORMAL
LEUKOCYTE ESTERASE URINE: ABNORMAL
MICROSCOPIC-UA: NORMAL
NITRITE URINE: NEGATIVE
PH URINE: 6
PROTEIN URINE: ABNORMAL
RED BLOOD CELLS URINE: 5 /HPF
SPECIFIC GRAVITY URINE: 1.02
SQUAMOUS EPITHELIAL CELLS: 3 /HPF
URINE COMMENTS: NORMAL
UROBILINOGEN URINE: NORMAL
WHITE BLOOD CELLS URINE: 65 /HPF

## 2021-09-15 PROCEDURE — 74230 X-RAY XM SWLNG FUNCJ C+: CPT | Mod: 26

## 2021-09-16 ENCOUNTER — RX RENEWAL (OUTPATIENT)
Age: 86
End: 2021-09-16

## 2021-09-16 ENCOUNTER — TRANSCRIPTION ENCOUNTER (OUTPATIENT)
Age: 86
End: 2021-09-16

## 2021-09-18 ENCOUNTER — TRANSCRIPTION ENCOUNTER (OUTPATIENT)
Age: 86
End: 2021-09-18

## 2021-09-18 ENCOUNTER — NON-APPOINTMENT (OUTPATIENT)
Age: 86
End: 2021-09-18

## 2021-09-20 ENCOUNTER — RX RENEWAL (OUTPATIENT)
Age: 86
End: 2021-09-20

## 2021-09-20 ENCOUNTER — APPOINTMENT (OUTPATIENT)
Dept: ULTRASOUND IMAGING | Facility: CLINIC | Age: 86
End: 2021-09-20

## 2021-09-20 LAB — BACTERIA UR CULT: ABNORMAL

## 2021-09-21 ENCOUNTER — APPOINTMENT (OUTPATIENT)
Dept: GERIATRICS | Facility: CLINIC | Age: 86
End: 2021-09-21

## 2021-10-06 PROBLEM — U07.1 PNEUMONIA DUE TO COVID-19 VIRUS: Status: ACTIVE | Noted: 2020-05-11

## 2021-10-12 ENCOUNTER — APPOINTMENT (OUTPATIENT)
Dept: NEUROLOGY | Facility: CLINIC | Age: 86
End: 2021-10-12

## 2021-10-20 ENCOUNTER — APPOINTMENT (OUTPATIENT)
Dept: FAMILY MEDICINE | Facility: CLINIC | Age: 86
End: 2021-10-20
Payer: MEDICARE

## 2021-10-20 VITALS
SYSTOLIC BLOOD PRESSURE: 108 MMHG | HEART RATE: 50 BPM | HEIGHT: 62 IN | WEIGHT: 110 LBS | BODY MASS INDEX: 20.24 KG/M2 | OXYGEN SATURATION: 96 % | TEMPERATURE: 97 F | RESPIRATION RATE: 15 BRPM | DIASTOLIC BLOOD PRESSURE: 58 MMHG

## 2021-10-20 DIAGNOSIS — I10 ESSENTIAL (PRIMARY) HYPERTENSION: ICD-10-CM

## 2021-10-20 DIAGNOSIS — R13.12 DYSPHAGIA, OROPHARYNGEAL PHASE: ICD-10-CM

## 2021-10-20 PROCEDURE — 99215 OFFICE O/P EST HI 40 MIN: CPT

## 2021-10-20 RX ORDER — PANTOPRAZOLE 20 MG/1
20 TABLET, DELAYED RELEASE ORAL
Qty: 90 | Refills: 0 | Status: DISCONTINUED | COMMUNITY
Start: 2021-09-20 | End: 2021-10-20

## 2021-10-20 RX ORDER — SCOPALAMINE 1 MG/3D
1 PATCH, EXTENDED RELEASE TRANSDERMAL
Refills: 0 | Status: DISCONTINUED | COMMUNITY
Start: 2021-08-12 | End: 2021-10-20

## 2021-10-20 RX ORDER — AMOXICILLIN 500 MG/1
500 TABLET, FILM COATED ORAL
Qty: 10 | Refills: 0 | Status: DISCONTINUED | COMMUNITY
Start: 2021-09-20 | End: 2021-10-20

## 2021-10-20 RX ORDER — ATORVASTATIN CALCIUM 80 MG/1
80 TABLET, FILM COATED ORAL
Qty: 30 | Refills: 0 | Status: COMPLETED | COMMUNITY
Start: 2021-08-12 | End: 2021-10-20

## 2021-10-20 NOTE — HEALTH RISK ASSESSMENT
[No] : In the past 12 months have you used drugs other than those required for medical reasons? No [No falls in past year] : Patient reported no falls in the past year [0] : 2) Feeling down, depressed, or hopeless: Not at all (0) [Name: ___] : Health Care Proxy's Name: [unfilled]  [Relationship: ___] : Relationship: [unfilled] [] : No [[id number]] : 0 [AdvancecareDate] : 10/21

## 2021-10-20 NOTE — PHYSICAL EXAM
[Well Nourished] : well nourished [Normal Sclera/Conjunctiva] : normal sclera/conjunctiva [EOMI] : extraocular movements intact [Normal Outer Ear/Nose] : the outer ears and nose were normal in appearance [Supple] : supple [No Respiratory Distress] : no respiratory distress  [Normal Rate] : normal rate  [Regular Rhythm] : with a regular rhythm [Normal S1, S2] : normal S1 and S2 [No Varicosities] : no varicosities [No Edema] : there was no peripheral edema [Soft] : abdomen soft [Non Tender] : non-tender [Non-distended] : non-distended [Normal Bowel Sounds] : normal bowel sounds [Normal Posterior Cervical Nodes] : no posterior cervical lymphadenopathy [Normal Anterior Cervical Nodes] : no anterior cervical lymphadenopathy [Normal Mood] : the mood was normal [de-identified] : murmur at sternal border  [de-identified] : urine incontinence, wears diapers [de-identified] :  uses wheelchair [de-identified] : right sided weakness

## 2021-10-20 NOTE — HISTORY OF PRESENT ILLNESS
[No episodes] : No hypoglycemic episodes since the last visit. [Check glucose ___ x/day] : Patient checks  blood glucose [unfilled]  times a day [Fasting:  ___] : Fasting Blood Sugar: [unfilled] mg/dL [] : always in range [Regular podiatrist visits] : Patient had regular podiatrist visits [Understanding of foot care] : Patient expressed understanding of foot care [No Retinopathy] : No retinopathy [Most Recent A1C: ___] : Most recent A1C was [unfilled] [Target A1C:  ___] : Target A1C is [unfilled] [Target goal met] : A1C target goal met [Moderate Intensity] : Patient is currently on moderate intensity statin  therapy [FreeTextEntry1] : Here for f/u dementia, gait abnormality. HTN, PVD.  [de-identified] : Here for f/u Dementia, diabetes.Here with DARVIN Cabezas . Her diabetes is under control. She is wheel chair bound. No fever, chills, chest pain , sob . HHA  helps patient with Adl's ,feeding assistance.patient has occasional cough, chronic. \par She is taking her meds regularly. She reduced dose of Lipitor from 80 to 10 mg in 09/2021 and stopped Scopolamine patch  due to dysphagia, loss of appetite.  Patient is sitting in wheelchair, NAD. sHE IS AAOx2-3.  She needs Home care aide forms completed.  [EyeExamDate] : 2021

## 2021-10-20 NOTE — REVIEW OF SYSTEMS
[Incontinence] : incontinence [Unsteady Walking] : ataxia [Negative] : Heme/Lymph [Dysuria] : no dysuria [Nocturia] : no nocturia [Poor Libido] : libido not poor [Hematuria] : no hematuria [Frequency] : no frequency [Vaginal Discharge] : no vaginal discharge [Dysmenorrhea] : no dysmenorrhea [Headache] : no headache [de-identified] : wheel chair dependent

## 2021-10-20 NOTE — PLAN
[FreeTextEntry1] : HTN: Enalapril  5 mg PO daily. \par keep BP log. check labs \par DM-2: stable. continue Meds. \par PVD:pt. will see vascular in 12/21 dr. Solis.\par pt. saw  cardiology. \par Patient needs wheelchair for AMBULATION Assistance AND Tanika LIFT FOR TRANSFER. sHE NEEDS HELP WITH ADLs AND NEEDS Continous HHA assistance. HHA form completed.\par  pt. has apt. to see podiatrist. \par  continue Speech therapy.

## 2021-10-20 NOTE — HEALTH RISK ASSESSMENT
[No] : In the past 12 months have you used drugs other than those required for medical reasons? No [No falls in past year] : Patient reported no falls in the past year [0] : 2) Feeling down, depressed, or hopeless: Not at all (0) [Name: ___] : Health Care Proxy's Name: [unfilled]  [Relationship: ___] : Relationship: [unfilled] [] : No [[id number]] : 0 [AdvancecareDate] : 10/21

## 2021-10-20 NOTE — PHYSICAL EXAM
[Well Nourished] : well nourished [Normal Sclera/Conjunctiva] : normal sclera/conjunctiva [EOMI] : extraocular movements intact [Normal Outer Ear/Nose] : the outer ears and nose were normal in appearance [Supple] : supple [No Respiratory Distress] : no respiratory distress  [Normal Rate] : normal rate  [Regular Rhythm] : with a regular rhythm [Normal S1, S2] : normal S1 and S2 [No Varicosities] : no varicosities [No Edema] : there was no peripheral edema [Soft] : abdomen soft [Non Tender] : non-tender [Non-distended] : non-distended [Normal Bowel Sounds] : normal bowel sounds [Normal Posterior Cervical Nodes] : no posterior cervical lymphadenopathy [Normal Anterior Cervical Nodes] : no anterior cervical lymphadenopathy [Normal Mood] : the mood was normal [de-identified] : murmur at sternal border  [de-identified] : urine incontinence, wears diapers [de-identified] : right sided weakness [de-identified] :  uses wheelchair

## 2021-10-20 NOTE — HISTORY OF PRESENT ILLNESS
[No episodes] : No hypoglycemic episodes since the last visit. [Check glucose ___ x/day] : Patient checks  blood glucose [unfilled]  times a day [Fasting:  ___] : Fasting Blood Sugar: [unfilled] mg/dL [] : always in range [Regular podiatrist visits] : Patient had regular podiatrist visits [Understanding of foot care] : Patient expressed understanding of foot care [No Retinopathy] : No retinopathy [Most Recent A1C: ___] : Most recent A1C was [unfilled] [Target A1C:  ___] : Target A1C is [unfilled] [Target goal met] : A1C target goal met [Moderate Intensity] : Patient is currently on moderate intensity statin  therapy [FreeTextEntry1] : Here for f/u dementia, gait abnormality. HTN, PVD.  [de-identified] : Here for f/u Dementia, diabetes.Here with DARVIN Cabezas . Her diabetes is under control. She is wheel chair bound. No fever, chills, chest pain , sob . HHA  helps patient with Adl's ,feeding assistance.patient has occasional cough, chronic. \par She is taking her meds regularly. She reduced dose of Lipitor from 80 to 10 mg in 09/2021 and stopped Scopolamine patch  due to dysphagia, loss of appetite.  Patient is sitting in wheelchair, NAD. sHE IS AAOx2-3.  She needs Home care aide forms completed.  [EyeExamDate] : 2021

## 2021-10-20 NOTE — PAST MEDICAL HISTORY
Improved.  Recommended Eylea #22 (1 of 2) injection today.  Pt elects to proceed.  The injection was given without complication. Post injection instructions given and understood by patient.  Series of 2 x 6 weeks apart. [Postmenopausal] : postmenopausal

## 2021-10-20 NOTE — REVIEW OF SYSTEMS
[Incontinence] : incontinence [Unsteady Walking] : ataxia [Negative] : Heme/Lymph [Dysuria] : no dysuria [Nocturia] : no nocturia [Poor Libido] : libido not poor [Hematuria] : no hematuria [Frequency] : no frequency [Vaginal Discharge] : no vaginal discharge [Dysmenorrhea] : no dysmenorrhea [Headache] : no headache [de-identified] : wheel chair dependent

## 2021-11-09 ENCOUNTER — APPOINTMENT (OUTPATIENT)
Dept: NEUROLOGY | Facility: CLINIC | Age: 86
End: 2021-11-09

## 2021-11-10 ENCOUNTER — APPOINTMENT (OUTPATIENT)
Dept: NEUROLOGY | Facility: CLINIC | Age: 86
End: 2021-11-10

## 2021-11-24 ENCOUNTER — APPOINTMENT (OUTPATIENT)
Dept: FAMILY MEDICINE | Facility: CLINIC | Age: 86
End: 2021-11-24

## 2021-11-30 ENCOUNTER — APPOINTMENT (OUTPATIENT)
Dept: GASTROENTEROLOGY | Facility: CLINIC | Age: 86
End: 2021-11-30

## 2021-12-02 ENCOUNTER — RX RENEWAL (OUTPATIENT)
Age: 86
End: 2021-12-02

## 2021-12-14 ENCOUNTER — APPOINTMENT (OUTPATIENT)
Dept: UROLOGY | Facility: CLINIC | Age: 86
End: 2021-12-14

## 2021-12-20 ENCOUNTER — APPOINTMENT (OUTPATIENT)
Dept: FAMILY MEDICINE | Facility: CLINIC | Age: 86
End: 2021-12-20

## 2022-01-03 ENCOUNTER — RX RENEWAL (OUTPATIENT)
Age: 87
End: 2022-01-03

## 2022-01-11 ENCOUNTER — APPOINTMENT (OUTPATIENT)
Dept: FAMILY MEDICINE | Facility: CLINIC | Age: 87
End: 2022-01-11

## 2022-01-12 ENCOUNTER — APPOINTMENT (OUTPATIENT)
Dept: NEUROLOGY | Facility: CLINIC | Age: 87
End: 2022-01-12

## 2022-02-16 ENCOUNTER — APPOINTMENT (OUTPATIENT)
Dept: FAMILY MEDICINE | Facility: CLINIC | Age: 87
End: 2022-02-16

## 2022-02-16 ENCOUNTER — APPOINTMENT (OUTPATIENT)
Age: 87
End: 2022-02-16
Payer: MEDICARE

## 2022-02-16 DIAGNOSIS — Z99.3 DEPENDENCE ON WHEELCHAIR: ICD-10-CM

## 2022-02-16 DIAGNOSIS — R53.81 OTHER MALAISE: ICD-10-CM

## 2022-02-16 PROCEDURE — 99212 OFFICE O/P EST SF 10 MIN: CPT | Mod: 95

## 2022-02-16 PROCEDURE — 99202 OFFICE O/P NEW SF 15 MIN: CPT | Mod: 95

## 2022-02-21 NOTE — PLAN
[FreeTextEntry1] : PT referral.\par  continue current medical management.\par Healthy diet .\par \par

## 2022-02-21 NOTE — HEALTH RISK ASSESSMENT
[Never] : Never [No] : In the past 12 months have you used drugs other than those required for medical reasons? No [No falls in past year] : Patient reported no falls in the past year [0] : 2) Feeling down, depressed, or hopeless: Not at all (0) [PHQ-2 Negative - No further assessment needed] : PHQ-2 Negative - No further assessment needed [With Patient/Caregiver] : , with patient/caregiver [de-identified] : regular [[id number]] : 0 [AdvancecareDate] : 02/22

## 2022-02-21 NOTE — PHYSICAL EXAM
[No Acute Distress] : no acute distress [Well Nourished] : well nourished [Normal Sclera/Conjunctiva] : normal sclera/conjunctiva [No Respiratory Distress] : no respiratory distress  [Speech Grossly Normal] : speech grossly normal [Normal Affect] : the affect was normal [Normal Mood] : the mood was normal

## 2022-02-21 NOTE — HISTORY OF PRESENT ILLNESS
[Home] : at home, [unfilled] , at the time of the visit. [Medical Office: ([facility])___] : at the medical office located in  [Verbal consent obtained from patient] : the patient, [unfilled] [FreeTextEntry1] : see HPI [de-identified] : Patient seen via Telehealth appointment for routine follow up. Daughter present at patient's side. Patient sitting in chair, comfortably, no acute distress. Patient is medically stable. denies chest pain, palpitations, cough. Patient 's daughter is requesting PT referral. They have relocated to [place] and will be following up with NP there.

## 2022-02-28 ENCOUNTER — NON-APPOINTMENT (OUTPATIENT)
Age: 87
End: 2022-02-28

## 2022-03-05 ENCOUNTER — TRANSCRIPTION ENCOUNTER (OUTPATIENT)
Age: 87
End: 2022-03-05

## 2022-03-11 ENCOUNTER — APPOINTMENT (OUTPATIENT)
Dept: FAMILY MEDICINE | Facility: CLINIC | Age: 87
End: 2022-03-11
Payer: MEDICARE

## 2022-03-11 DIAGNOSIS — R09.89 OTHER SPECIFIED SYMPTOMS AND SIGNS INVOLVING THE CIRCULATORY AND RESPIRATORY SYSTEMS: ICD-10-CM

## 2022-03-11 DIAGNOSIS — N39.0 URINARY TRACT INFECTION, SITE NOT SPECIFIED: ICD-10-CM

## 2022-03-11 PROCEDURE — 99214 OFFICE O/P EST MOD 30 MIN: CPT | Mod: 95

## 2022-03-13 PROBLEM — N39.0 CHRONIC UTI: Status: ACTIVE | Noted: 2022-03-13

## 2022-03-13 PROBLEM — R09.89 CHEST CONGESTION: Status: RESOLVED | Noted: 2021-02-25 | Resolved: 2022-03-13

## 2022-03-13 PROBLEM — N39.0 ACUTE UTI: Status: RESOLVED | Noted: 2017-12-21 | Resolved: 2022-03-13

## 2022-03-13 NOTE — HISTORY OF PRESENT ILLNESS
[FreeTextEntry1] : see HPI [de-identified] : On 2/23 , she had LOC at home , diagnosed in hospital with UTI, mini stroke. Daughter requesting form completion for sister to take time off  work to care for patient. Patient needs assistance with ADL's due to history of stroke and Dementia.

## 2022-03-13 NOTE — HEALTH RISK ASSESSMENT
[Never] : Never [No] : In the past 12 months have you used drugs other than those required for medical reasons? No [No falls in past year] : Patient reported no falls in the past year [0] : 2) Feeling down, depressed, or hopeless: Not at all (0) [PHQ-2 Negative - No further assessment needed] : PHQ-2 Negative - No further assessment needed [With Patient/Caregiver] : , with patient/caregiver [de-identified] : regular [AdvancecareDate] : 03/22

## 2022-03-13 NOTE — PLAN
[FreeTextEntry1] : Discussed option to start chronic suppressive antibiotic  for UTI.\par  f/u urology. Form completion for patient's daughter for FMLA.

## 2022-03-13 NOTE — PHYSICAL EXAM
[No Acute Distress] : no acute distress [Well Nourished] : well nourished [Well Developed] : well developed [Normal Sclera/Conjunctiva] : normal sclera/conjunctiva [Well-Appearing] : well-appearing [Normal Outer Ear/Nose] : the outer ears and nose were normal in appearance [Supple] : supple [No Respiratory Distress] : no respiratory distress  [Normal Posterior Cervical Nodes] : no posterior cervical lymphadenopathy [Normal Anterior Cervical Nodes] : no anterior cervical lymphadenopathy [Normal Affect] : the affect was normal

## 2022-03-15 ENCOUNTER — RX RENEWAL (OUTPATIENT)
Age: 87
End: 2022-03-15

## 2022-04-18 ENCOUNTER — RX RENEWAL (OUTPATIENT)
Age: 87
End: 2022-04-18

## 2022-06-10 ENCOUNTER — APPOINTMENT (OUTPATIENT)
Dept: FAMILY MEDICINE | Facility: CLINIC | Age: 87
End: 2022-06-10
Payer: MEDICARE

## 2022-06-10 VITALS
TEMPERATURE: 97.7 F | OXYGEN SATURATION: 99 % | RESPIRATION RATE: 15 BRPM | SYSTOLIC BLOOD PRESSURE: 124 MMHG | HEIGHT: 62 IN | HEART RATE: 89 BPM | DIASTOLIC BLOOD PRESSURE: 66 MMHG

## 2022-06-10 DIAGNOSIS — R26.9 UNSPECIFIED ABNORMALITIES OF GAIT AND MOBILITY: ICD-10-CM

## 2022-06-10 PROCEDURE — 99495 TRANSJ CARE MGMT MOD F2F 14D: CPT

## 2022-06-10 RX ORDER — CIPROFLOXACIN HYDROCHLORIDE 250 MG/1
250 TABLET, FILM COATED ORAL
Qty: 14 | Refills: 0 | Status: DISCONTINUED | COMMUNITY
Start: 2021-09-13 | End: 2022-06-10

## 2022-06-10 NOTE — HISTORY OF PRESENT ILLNESS
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 , Covid positive, hypotension, seizure, spell of change of speech ,UTI\par   Admitted at [facility] for 1 week. frequent UTI's. sHE TAKES cRANBERRY SUPPLEMENT BUT has hard time swallowing. She is undergoing US of vagina next week due to fluid . Pap smear was abnormal.She has colposcopy scheduled in 07/2022. Here with her 2 daughters.She has established with new cardiologist.   she needs paperwork completed for hospital bed necessity. She has been off Plavix for over a year and is due to see dr. Munoz, vascular. Daughter Loly Cameron has been in [facility] since November and has been taking care of her mom. \par

## 2022-06-10 NOTE — PHYSICAL EXAM
[No Acute Distress] : no acute distress [Well Nourished] : well nourished [Well Developed] : well developed [Well-Appearing] : well-appearing [Normal Sclera/Conjunctiva] : normal sclera/conjunctiva [PERRL] : pupils equal round and reactive to light [Normal Outer Ear/Nose] : the outer ears and nose were normal in appearance [Supple] : supple [No Respiratory Distress] : no respiratory distress  [Clear to Auscultation] : lungs were clear to auscultation bilaterally [Normal Rate] : normal rate  [Regular Rhythm] : with a regular rhythm [Normal S1, S2] : normal S1 and S2 [No Edema] : there was no peripheral edema [No Extremity Clubbing/Cyanosis] : no extremity clubbing/cyanosis [Soft] : abdomen soft [Non Tender] : non-tender [Non-distended] : non-distended [No Masses] : no abdominal mass palpated [No HSM] : no HSM [Normal Bowel Sounds] : normal bowel sounds [Normal Posterior Cervical Nodes] : no posterior cervical lymphadenopathy [Normal Anterior Cervical Nodes] : no anterior cervical lymphadenopathy [Normal Affect] : the affect was normal [Normal Mood] : the mood was normal [Comprehensive Foot Exam Normal] : Right and left foot were examined and both feet are normal. No ulcers in either foot. Toes are normal and with full ROM.  Normal tactile sensation with monofilament testing throughout both feet [de-identified] : unsteady gait [de-identified] : except feeble pulses [12162 - Moderate Complexity requires multiple possible diagnoses and/or the management options, moderate complexity of the medical data (tests, etc.) to be reviewed, and moderate risk of significant complications, morbidity, and/or mortality as well as co] : Moderate Complexity

## 2022-06-10 NOTE — HEALTH RISK ASSESSMENT
[Never] : Never [No] : In the past 12 months have you used drugs other than those required for medical reasons? No [No falls in past year] : Patient reported no falls in the past year [0] : 2) Feeling down, depressed, or hopeless: Not at all (0) [PHQ-2 Negative - No further assessment needed] : PHQ-2 Negative - No further assessment needed [Patient declined mammogram] : Patient declined mammogram [Patient declined PAP Smear] : Patient declined PAP Smear [Patient declined bone density test] : Patient declined bone density test [Patient declined colonoscopy] : Patient declined colonoscopy [HIV test declined] : HIV test declined [Hepatitis C test declined] : Hepatitis C test declined [Language] : difficulty with language [Learning/Retaining New Information] : difficulty learning/retaining new information [Handling Complex Tasks] : difficulty handling complex tasks [None] : None [With Family] : lives with family [Retired] : retired [] :  [Feels Safe at Home] : Feels safe at home [Full assistance needed] : managing finances [Reports changes in hearing] : Reports changes in hearing [Reports normal functional visual acuity (ie: able to read med bottle)] : Reports normal functional visual acuity [Reports changes in dental health] : Reports changes in dental health [Smoke Detector] : smoke detector [Carbon Monoxide Detector] : carbon monoxide detector [Seat Belt] :  uses seat belt [With Patient/Caregiver] : , with patient/caregiver [de-identified] : wheelchair bound [de-identified] : r [[id number]] : 0 [Change in mental status noted] : No change in mental status noted [Behavior] : denies difficulty with behavior [Reasoning] : denies difficulty with reasoning [Spatial Ability and Orientation] : denies difficulty with spatial ability and orientation [Sexually Active] : not sexually active [Reports changes in vision] : Reports no changes in vision [de-identified] : lower dentures [AdvancecareDate] : 06/22

## 2022-06-10 NOTE — PHYSICAL EXAM
[No Acute Distress] : no acute distress [Well Nourished] : well nourished [Well Developed] : well developed [Well-Appearing] : well-appearing [Normal Sclera/Conjunctiva] : normal sclera/conjunctiva [PERRL] : pupils equal round and reactive to light [Normal Outer Ear/Nose] : the outer ears and nose were normal in appearance [Supple] : supple [No Respiratory Distress] : no respiratory distress  [Clear to Auscultation] : lungs were clear to auscultation bilaterally [Normal Rate] : normal rate  [Regular Rhythm] : with a regular rhythm [Normal S1, S2] : normal S1 and S2 [No Edema] : there was no peripheral edema [No Extremity Clubbing/Cyanosis] : no extremity clubbing/cyanosis [Soft] : abdomen soft [Non Tender] : non-tender [Non-distended] : non-distended [No Masses] : no abdominal mass palpated [No HSM] : no HSM [Normal Bowel Sounds] : normal bowel sounds [Normal Posterior Cervical Nodes] : no posterior cervical lymphadenopathy [Normal Anterior Cervical Nodes] : no anterior cervical lymphadenopathy [Normal Affect] : the affect was normal [Normal Mood] : the mood was normal [Comprehensive Foot Exam Normal] : Right and left foot were examined and both feet are normal. No ulcers in either foot. Toes are normal and with full ROM.  Normal tactile sensation with monofilament testing throughout both feet [de-identified] : unsteady gait [de-identified] : except feeble pulses [84764 - Moderate Complexity requires multiple possible diagnoses and/or the management options, moderate complexity of the medical data (tests, etc.) to be reviewed, and moderate risk of significant complications, morbidity, and/or mortality as well as co] : Moderate Complexity

## 2022-06-10 NOTE — HISTORY OF PRESENT ILLNESS
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 , Covid positive, hypotension, seizure, spell of change of speech ,UTI\par   Admitted at [facility] for 1 week. frequent UTI's. sHE TAKES cRANBERRY SUPPLEMENT BUT has hard time swallowing. She is undergoing US of vagina next week due to fluid . Pap smear was abnormal.She has colposcopy scheduled in 07/2022. Here with her 2 daughters.She has established with new cardiologist.   she needs paperwork completed for hospital bed necessity. She has been off Plavix for over a year and is due to see dr. Munoz, vascular. Daughter Loly Cameron has been in [place] since November and has been taking care of her mom. \par

## 2022-06-10 NOTE — PLAN
[FreeTextEntry1] : Hospital bed forms completed. \par labs in few months.\par  urology referral.\par  vascular f/u. \par

## 2022-06-10 NOTE — REVIEW OF SYSTEMS
[Memory Loss] : memory loss [Unsteady Walking] : ataxia [Negative] : Heme/Lymph [Headache] : no headache [Dizziness] : no dizziness [Fainting] : no fainting [Confusion] : no confusion [FreeTextEntry8] : frequent UTI [FreeTextEntry9] : feet pain

## 2022-06-10 NOTE — HEALTH RISK ASSESSMENT
[Never] : Never [No] : In the past 12 months have you used drugs other than those required for medical reasons? No [No falls in past year] : Patient reported no falls in the past year [0] : 2) Feeling down, depressed, or hopeless: Not at all (0) [PHQ-2 Negative - No further assessment needed] : PHQ-2 Negative - No further assessment needed [Patient declined mammogram] : Patient declined mammogram [Patient declined PAP Smear] : Patient declined PAP Smear [Patient declined bone density test] : Patient declined bone density test [Patient declined colonoscopy] : Patient declined colonoscopy [HIV test declined] : HIV test declined [Hepatitis C test declined] : Hepatitis C test declined [Language] : difficulty with language [Learning/Retaining New Information] : difficulty learning/retaining new information [Handling Complex Tasks] : difficulty handling complex tasks [None] : None [With Family] : lives with family [Retired] : retired [] :  [Feels Safe at Home] : Feels safe at home [Full assistance needed] : managing finances [Reports changes in hearing] : Reports changes in hearing [Reports normal functional visual acuity (ie: able to read med bottle)] : Reports normal functional visual acuity [Reports changes in dental health] : Reports changes in dental health [Smoke Detector] : smoke detector [Carbon Monoxide Detector] : carbon monoxide detector [Seat Belt] :  uses seat belt [With Patient/Caregiver] : , with patient/caregiver [de-identified] : wheelchair bound [de-identified] : r [[id number]] : 0 [Change in mental status noted] : No change in mental status noted [Behavior] : denies difficulty with behavior [Reasoning] : denies difficulty with reasoning [Spatial Ability and Orientation] : denies difficulty with spatial ability and orientation [Sexually Active] : not sexually active [Reports changes in vision] : Reports no changes in vision [de-identified] : lower dentures [AdvancecareDate] : 06/22

## 2022-07-03 ENCOUNTER — RX RENEWAL (OUTPATIENT)
Age: 87
End: 2022-07-03

## 2022-07-25 ENCOUNTER — NON-APPOINTMENT (OUTPATIENT)
Age: 87
End: 2022-07-25

## 2022-07-26 NOTE — DISCHARGE NOTE NURSING/CASE MANAGEMENT/SOCIAL WORK - NSDCPETBCESMAN_GEN_ALL_CORE
If you are a smoker, it is important for your health to stop smoking. Please be aware that second hand smoke is also harmful. Perilesional Excision Additional Text (Leave Blank If You Do Not Want): The margin was drawn around the clinically apparent lesion. Incisions were then made along these lines to the appropriate tissue plane and the lesion was extirpated.

## 2022-08-08 ENCOUNTER — TRANSCRIPTION ENCOUNTER (OUTPATIENT)
Age: 87
End: 2022-08-08

## 2022-08-09 ENCOUNTER — APPOINTMENT (OUTPATIENT)
Age: 87
End: 2022-08-09

## 2022-08-09 VITALS
TEMPERATURE: 97.5 F | RESPIRATION RATE: 15 BRPM | OXYGEN SATURATION: 93 % | SYSTOLIC BLOOD PRESSURE: 96 MMHG | WEIGHT: 110 LBS | HEIGHT: 62 IN | DIASTOLIC BLOOD PRESSURE: 50 MMHG | BODY MASS INDEX: 20.24 KG/M2 | HEART RATE: 76 BPM

## 2022-08-09 DIAGNOSIS — N39.0 URINARY TRACT INFECTION, SITE NOT SPECIFIED: ICD-10-CM

## 2022-08-09 DIAGNOSIS — R23.8 OTHER SKIN CHANGES: ICD-10-CM

## 2022-08-09 PROCEDURE — 99214 OFFICE O/P EST MOD 30 MIN: CPT

## 2022-08-12 NOTE — PLAN
[FreeTextEntry1] : continue current meds.\par  Urology and wound care referral.  antibiotic cream rx. \par WOUND CARE Precautions.

## 2022-08-12 NOTE — HEALTH RISK ASSESSMENT
[Never] : Never [No] : In the past 12 months have you used drugs other than those required for medical reasons? No [No falls in past year] : Patient reported no falls in the past year [0] : 2) Feeling down, depressed, or hopeless: Not at all (0) [PHQ-2 Negative - No further assessment needed] : PHQ-2 Negative - No further assessment needed [de-identified] : wheelchair bound  [de-identified] : regular [[id number]] : 0

## 2022-08-12 NOTE — PHYSICAL EXAM
[No Acute Distress] : no acute distress [Well Nourished] : well nourished [Well Developed] : well developed [Well-Appearing] : well-appearing [Normal Sclera/Conjunctiva] : normal sclera/conjunctiva [PERRL] : pupils equal round and reactive to light [Normal Outer Ear/Nose] : the outer ears and nose were normal in appearance [Supple] : supple [No Respiratory Distress] : no respiratory distress  [Clear to Auscultation] : lungs were clear to auscultation bilaterally [Normal Rate] : normal rate  [Regular Rhythm] : with a regular rhythm [Normal S1, S2] : normal S1 and S2 [No Edema] : there was no peripheral edema [No Extremity Clubbing/Cyanosis] : no extremity clubbing/cyanosis [Soft] : abdomen soft [Non Tender] : non-tender [Non-distended] : non-distended [No Masses] : no abdominal mass palpated [No HSM] : no HSM [Normal Bowel Sounds] : normal bowel sounds [Normal Posterior Cervical Nodes] : no posterior cervical lymphadenopathy [Normal Anterior Cervical Nodes] : no anterior cervical lymphadenopathy [Normal Affect] : the affect was normal [Normal Mood] : the mood was normal [Comprehensive Foot Exam Normal] : Right and left foot were examined and both feet are normal. No ulcers in either foot. Toes are normal and with full ROM.  Normal tactile sensation with monofilament testing throughout both feet [de-identified] : left shoulder side skin peeling [de-identified] : unsteady gait

## 2022-08-12 NOTE — REVIEW OF SYSTEMS
[Headache] : no headache [Dizziness] : no dizziness [Fainting] : no fainting [Confusion] : no confusion [Memory Loss] : memory loss [Unsteady Walking] : ataxia [Negative] : Heme/Lymph [FreeTextEntry8] : frequent UTI [FreeTextEntry9] : feet pain  [de-identified] : skin break left shoulder side

## 2022-08-12 NOTE — HISTORY OF PRESENT ILLNESS
[FreeTextEntry8] : c/o left arm skin break down for 1.5 week since return from [facility] , treated with Bacitracin . fasting glucose good. Here with daughter and aide. She is taking her meds regularly, she is wheel chair bound. No chest pain, SOB, cough, fever, chills. She was seen by vascular recently. Here with daughter Catherine. She has h/o frequent UTI.Here with aide and daughter.

## 2022-09-15 NOTE — REVIEW OF SYSTEMS
Well Child Visit at 6 to 15 Years   AMBULATORY CARE:   A well child visit  is when your child sees a healthcare provider to prevent health problems  Well child visits are used to track your child's growth and development  It is also a time for you to ask questions and to get information on how to keep your child safe  Write down your questions so you remember to ask them  Your child should have regular well child visits from birth to 25 years  Development milestones your child may reach at 6 to 14 years:  Each child develops at his or her own pace  Your child might have already reached the following milestones, or he or she may reach them later:  Breast development (girls), testicle and penis enlargement (boys), and armpit or pubic hair    Menstruation (monthly periods) in girls    Skin changes, such as oily skin and acne    Not understanding that actions may have negative effects    Focus on appearance and a need to be accepted by others his or her own age    Help your child get the right nutrition:   Teach your child about a healthy meal plan by setting a good example  Your child still learns from your eating habits  Buy healthy foods for your family  Eat healthy meals together as a family as often as possible  Talk with your child about why it is important to choose healthy foods  Let your child decide how much to eat  Give your child small portions  Let him or her have another serving if he or she asks for one  Your child will be very hungry on some days and want to eat more  For example, your child may want to eat more on days when he or she is more active  Your child may also eat more if he or she is going through a growth spurt  There may be days when he or she eats less than usual          Encourage your child to eat regular meals and snacks, even if he or she is busy  Your child should eat 3 meals and 2 snacks each day to help meet his or her calorie needs   He or she should also eat a variety of healthy foods to get the nutrients he or she needs, and to maintain a healthy weight  You may need to help your child plan meals and snacks  Suggest healthy food choices that your child can make when he or she eats out  Your child could order a chicken sandwich instead of a large burger or choose a side salad instead of [employer]ani fries  Praise your child's good food choices whenever you can  Provide a variety of fruits and vegetables  Half of your child's plate should contain fruits and vegetables  He or she should eat about 5 servings of fruits and vegetables each day  Buy fresh, canned, or dried fruit instead of fruit juice as often as possible  Offer more dark green, red, and orange vegetables  Dark green vegetables include broccoli, spinach, zane lettuce, and nina greens  Examples of orange and red vegetables are carrots, sweet potatoes, winter squash, and red peppers  Provide whole-grain foods  Half of the grains your child eats each day should be whole grains  Whole grains include brown rice, whole-wheat pasta, and whole-grain cereals and breads  Provide low-fat dairy foods  Dairy foods are a good source of calcium  Your child needs 1,300 milligrams (mg) of calcium each day  Dairy foods include milk, cheese, cottage cheese, and yogurt  Provide lean meats, poultry, fish, and other healthy protein foods  Other healthy protein foods include legumes (such as beans), soy foods (such as tofu), and peanut butter  Bake, broil, and grill meat instead of frying it to reduce the amount of fat  Use healthy fats to prepare your child's food  Unsaturated fat is a healthy fat  It is found in foods such as soybean, canola, olive, and sunflower oils  It is also found in soft tub margarine that is made with liquid vegetable oil  Limit unhealthy fats such as saturated fat, trans fat, and cholesterol  These are found in shortening, butter, margarine, and animal fat      Help your child limit his or her intake of fat, sugar, and caffeine  Foods high in fat and sugar include snack foods (potato chips, candy, and other sweets), juice, fruit drinks, and soda  If your child eats these foods too often, he or she may eat fewer healthy foods during mealtimes  He or she may also gain too much weight  Caffeine is found in soft drinks, energy drinks, tea, coffee, and some over-the-counter medicines  Your child should limit his or her intake of caffeine to 100 mg or less each day  Caffeine can cause your child to feel jittery, anxious, or dizzy  It can also cause headaches and trouble sleeping  Encourage your child to talk to you or a healthcare provider about safe weight loss, if needed  Adolescents may want to follow a fad diet they see their friends or famous people following  Fad diets usually do not have all the nutrients your child needs to grow and stay healthy  Diets may also lead to eating disorders such as anorexia and bulimia  Anorexia is refusal to eat  Bulimia is binge eating followed by vomiting, using laxative medicine, not eating at all, or heavy exercise  Help your  for his or her teeth:   Remind your child to brush his or her teeth 2 times each day  Mouth care prevents infection, plaque, bleeding gums, mouth sores, and cavities  It also freshens breath and improves appetite  Take your child to the dentist at least 2 times each year  A dentist can check for problems with your child's teeth or gums, and provide treatments to protect his or her teeth  Encourage your child to wear a mouth guard during sports  This will protect your child's teeth from injury  Make sure the mouth guard fits correctly  Ask your child's healthcare provider for more information on mouth guards  Keep your child safe:   Remind your child to always wear a seatbelt  Make sure everyone in your car wears a seatbelt  Encourage your child to do safe and healthy activities    Encourage your child to play sports or join an after school program     Store and lock all weapons  Lock ammunition in a separate place  Do not show or tell your child where you keep the key  Make sure all guns are unloaded before you store them  Encourage your child to use safety equipment  Encourage him or her to wear helmets, protective sports gear, and life jackets  Other ways to care for your child:   Talk to your child about puberty  Puberty usually starts between ages 6 to 15 in girls, but it may start earlier or later  Puberty usually ends by about age 15 in girls  Puberty usually starts between ages 8 to 15 in boys, but it may start earlier or later  Puberty usually ends by about age 13 or 12 in boys  Ask your child's healthcare provider for information about how to talk to your child about puberty, if needed  Encourage your child to get 1 hour of physical activity each day  Examples of physical activities include sports, running, walking, swimming, and riding bikes  The hour of physical activity does not need to be done all at once  It can be done in shorter blocks of time  Your child can fit in more physical activity by limiting screen time  Limit your child's screen time  Screen time is the amount of television, computer, smart phone, and video game time your child has each day  It is important to limit screen time  This helps your child get enough sleep, physical activity, and social interaction each day  Your child's pediatrician can help you create a screen time plan  The daily limit is usually 1 hour for children 2 to 5 years  The daily limit is usually 2 hours for children 6 years or older  You can also set limits on the kinds of devices your child can use, and where he or she can use them  Keep the plan where your child and anyone who takes care of him or her can see it  Create a plan for each child in your family   You can also go to [employer]/English/media/Pages/default  aspx#planview for more help creating a plan  Praise your child for good behavior  Do this any time he or she does well in school or makes safe and healthy choices  Monitor your child's progress at school  Go to [facility]  Ask your child to let you see your child's report card  Help your child solve problems and make decisions  Ask your child about any problems or concerns he or she has  Make time to listen to your child's hopes and concerns  Find ways to help your child work through problems and make healthy decisions  Help your child find healthy ways to deal with stress  Be a good example of how to handle stress  Help your child find activities that help him or her manage stress  Examples include exercising, reading, or listening to music  Encourage your child to talk to you when he or she is feeling stressed, sad, angry, hopeless, or depressed  Encourage your child to create healthy relationships  Know your child's friends and their parents  Know where your child is and what he or she is doing at all times  Encourage your child to tell you if he or she thinks he or she is being bullied  Talk with your child about healthy dating relationships  Tell your child it is okay to say "no" and to respect when someone else says "no "    Encourage your child not to use drugs, tobacco products, nicotine, or alcohol  By talking with your child at this age, you can help prepare him or her to make healthy choices as a teenager  Explain that these substances are dangerous and that you care about your child's health  Nicotine and other chemicals in cigarettes, cigars, and e-cigarettes can cause lung damage  Nicotine and alcohol can also affect brain development  This can lead to trouble thinking, learning, or paying attention  Help your teen understand that vaping is not safer than smoking regular cigarettes or cigars   Talk to him or her about the importance of healthy brain and body development during the teen years  Choices during these years can help him or her become a healthy adult  Be prepared to talk your child about sex  Answer your child's questions directly  Ask your child's healthcare provider where you can get more information on how to talk to your child about sex  Which vaccines and screenings may my child get during this well child visit? Vaccines  include influenza (flu) every year  Tdap (tetanus, diphtheria, and pertussis), MMR (measles, mumps, and rubella), varicella (chickenpox), meningococcal, and HPV (human papillomavirus) vaccines are also usually given  Screening  may be needed to check for sexually transmitted infections (STIs)  Screening may also check your child's lipid (cholesterol and fatty acids) level  What you need to know about your child's next well child visit:  Your child's healthcare provider will tell you when to bring your child in again  The next well child visit is usually at 13 to 18 years  Your child may be given meningococcal, HPV, MMR, or varicella vaccines  This depends on the vaccines your child was given during this well child visit  He or she may also need lipid or STI screenings  Information about safe sex practices may be given  These practices help prevent pregnancy and STIs  Contact your child's healthcare provider if you have questions or concerns about your child's health or care before the next visit  © Copyright "[employer] 2022 Information is for End User's use only and may not be sold, redistributed or otherwise used for commercial purposes  All illustrations and images included in CareNotes® are the copyrighted property of [employer]  or [facility] Ar Trevino   The above information is an  only  It is not intended as medical advice for individual conditions or treatments   Talk to your doctor, nurse or pharmacist before following any medical regimen to see if it is safe and effective for you  [Fatigue] : fatigue [Nasal Discharge] : nasal discharge [Postnasal Drip] : postnasal drip [Cough] : cough [Negative] : Heme/Lymph [Fever] : no fever [Chills] : no chills [Shortness Of Breath] : no shortness of breath [Wheezing] : no wheezing

## 2022-09-26 NOTE — ED ADULT NURSE NOTE - NEURO ASSESSMENT
Retinal tear and detachment warning symptoms reviewed and patient instructed to call immediately if increasing floaters, flashes, or decreasing peripheral vision. - - -

## 2022-10-07 ENCOUNTER — LABORATORY RESULT (OUTPATIENT)
Age: 87
End: 2022-10-07

## 2022-10-14 ENCOUNTER — APPOINTMENT (OUTPATIENT)
Dept: FAMILY MEDICINE | Facility: CLINIC | Age: 87
End: 2022-10-14

## 2022-10-14 VITALS
DIASTOLIC BLOOD PRESSURE: 60 MMHG | SYSTOLIC BLOOD PRESSURE: 98 MMHG | WEIGHT: 110 LBS | HEIGHT: 62 IN | RESPIRATION RATE: 16 BRPM | BODY MASS INDEX: 20.24 KG/M2 | OXYGEN SATURATION: 99 % | TEMPERATURE: 97.4 F | HEART RATE: 82 BPM

## 2022-10-14 DIAGNOSIS — I73.9 PERIPHERAL VASCULAR DISEASE, UNSPECIFIED: ICD-10-CM

## 2022-10-14 DIAGNOSIS — I69.359 HEMIPLEGIA AND HEMIPARESIS FOLLOWING CEREBRAL INFARCTION AFFECTING UNSPECIFIED SIDE: ICD-10-CM

## 2022-10-14 DIAGNOSIS — Z23 ENCOUNTER FOR IMMUNIZATION: ICD-10-CM

## 2022-10-14 DIAGNOSIS — I63.9 CEREBRAL INFARCTION, UNSPECIFIED: ICD-10-CM

## 2022-10-14 PROCEDURE — 90686 IIV4 VACC NO PRSV 0.5 ML IM: CPT

## 2022-10-14 PROCEDURE — G0008: CPT

## 2022-10-14 PROCEDURE — 99214 OFFICE O/P EST MOD 30 MIN: CPT | Mod: 25

## 2022-10-14 RX ORDER — MUPIROCIN 2 G/100G
2 CREAM TOPICAL
Qty: 1 | Refills: 0 | Status: DISCONTINUED | COMMUNITY
Start: 2022-08-09 | End: 2022-10-14

## 2022-10-14 RX ORDER — CLOPIDOGREL 75 MG/1
75 TABLET, FILM COATED ORAL DAILY
Refills: 0 | Status: DISCONTINUED | COMMUNITY
Start: 2021-08-12 | End: 2022-10-14

## 2022-10-14 RX ORDER — METHENAMINE HIPPURATE 1 G/1
1 TABLET ORAL
Refills: 0 | Status: DISCONTINUED | COMMUNITY
End: 2022-10-14

## 2022-10-14 NOTE — PLAN
[FreeTextEntry1] : stain for HLD. low chol. diet.\par  Flu vaccine given . \par  continue current management. \par pt. will  follow urology.

## 2022-10-14 NOTE — HISTORY OF PRESENT ILLNESS
[FreeTextEntry1] : see HPI [de-identified] : Here with daughter and aide. She Is sitting in wheelchair, comfortable, NAD. She is AAOX3, NAD. She has HLD and daughter is requesting refill for statin. Patient  had blood work done recently. Labs reviewed with daughter.Patient eats eggs daily and is watching her carbs. She is requesting Flu vaccine. She is followed by cardiology and vascular.

## 2022-10-14 NOTE — HEALTH RISK ASSESSMENT
[With Patient/Caregiver] : , with patient/caregiver [Never] : Never [No] : In the past 12 months have you used drugs other than those required for medical reasons? No [No falls in past year] : Patient reported no falls in the past year [0] : 2) Feeling down, depressed, or hopeless: Not at all (0) [PHQ-2 Negative - No further assessment needed] : PHQ-2 Negative - No further assessment needed [de-identified] : sedentary, wheelchair bound [de-identified] : diabetic [[id number]] : 0 [AdvancecareDate] : 10/22

## 2022-10-14 NOTE — PHYSICAL EXAM
[No Acute Distress] : no acute distress [Well Nourished] : well nourished [Well Developed] : well developed [Well-Appearing] : well-appearing [Normal Sclera/Conjunctiva] : normal sclera/conjunctiva [PERRL] : pupils equal round and reactive to light [Normal Outer Ear/Nose] : the outer ears and nose were normal in appearance [Supple] : supple [No Respiratory Distress] : no respiratory distress  [Clear to Auscultation] : lungs were clear to auscultation bilaterally [Normal Rate] : normal rate  [Regular Rhythm] : with a regular rhythm [Normal S1, S2] : normal S1 and S2 [No Edema] : there was no peripheral edema [No Extremity Clubbing/Cyanosis] : no extremity clubbing/cyanosis [Soft] : abdomen soft [Non Tender] : non-tender [Non-distended] : non-distended [No Masses] : no abdominal mass palpated [No HSM] : no HSM [Normal Bowel Sounds] : normal bowel sounds [Normal Posterior Cervical Nodes] : no posterior cervical lymphadenopathy [Normal Anterior Cervical Nodes] : no anterior cervical lymphadenopathy [Normal Affect] : the affect was normal [Normal Mood] : the mood was normal [Comprehensive Foot Exam Normal] : Right and left foot were examined and both feet are normal. No ulcers in either foot. Toes are normal and with full ROM.  Normal tactile sensation with monofilament testing throughout both feet [de-identified] : sitting in wheelchair [de-identified] : unsteady gait

## 2022-10-14 NOTE — REVIEW OF SYSTEMS
[Memory Loss] : memory loss [Unsteady Walking] : ataxia [Negative] : Heme/Lymph [Headache] : no headache [Dizziness] : no dizziness [Fainting] : no fainting [Confusion] : no confusion [de-identified] : skin break left shoulder side

## 2022-11-01 ENCOUNTER — RX RENEWAL (OUTPATIENT)
Age: 87
End: 2022-11-01

## 2022-12-15 RX ORDER — LANCETS 28 GAUGE
EACH MISCELLANEOUS
Qty: 100 | Refills: 0 | Status: ACTIVE | COMMUNITY
Start: 2017-02-03 | End: 1900-01-01

## 2022-12-15 RX ORDER — BLOOD SUGAR DIAGNOSTIC
STRIP MISCELLANEOUS
Qty: 100 | Refills: 0 | Status: ACTIVE | COMMUNITY
Start: 2017-09-08 | End: 1900-01-01

## 2023-01-01 NOTE — PLAN
Goal Outcome Evaluation:      Vital signs and  assessment WDL. Voiding and stooling adequate for age. Breast and bottle feeding expressed maternal milk. Positive attachment behaviors observed between  and parents. Continue with education and plan of care.                    [FreeTextEntry1] : Hospital bed forms completed. \par labs in few months.\par  urology referral.\par  vascular f/u. \par

## 2023-02-14 ENCOUNTER — APPOINTMENT (OUTPATIENT)
Dept: FAMILY MEDICINE | Facility: CLINIC | Age: 88
End: 2023-02-14
Payer: MEDICARE

## 2023-02-14 VITALS
SYSTOLIC BLOOD PRESSURE: 154 MMHG | DIASTOLIC BLOOD PRESSURE: 86 MMHG | BODY MASS INDEX: 21.6 KG/M2 | HEART RATE: 82 BPM | HEIGHT: 60 IN | RESPIRATION RATE: 16 BRPM | OXYGEN SATURATION: 88 % | TEMPERATURE: 97.8 F | WEIGHT: 110 LBS

## 2023-02-14 DIAGNOSIS — R32 UNSPECIFIED URINARY INCONTINENCE: ICD-10-CM

## 2023-02-14 DIAGNOSIS — E11.9 TYPE 2 DIABETES MELLITUS W/OUT COMPLICATIONS: Chronic | ICD-10-CM

## 2023-02-14 DIAGNOSIS — E11.51 TYPE 2 DIABETES MELLITUS WITH DIABETIC PERIPHERAL ANGIOPATHY W/OUT GANGRENE: Chronic | ICD-10-CM

## 2023-02-14 DIAGNOSIS — E55.9 VITAMIN D DEFICIENCY, UNSPECIFIED: ICD-10-CM

## 2023-02-14 DIAGNOSIS — J84.10 PULMONARY FIBROSIS, UNSPECIFIED: Chronic | ICD-10-CM

## 2023-02-14 PROCEDURE — 99214 OFFICE O/P EST MOD 30 MIN: CPT

## 2023-02-14 RX ORDER — METFORMIN HYDROCHLORIDE 500 MG/1
500 TABLET, COATED ORAL
Qty: 90 | Refills: 3 | Status: ACTIVE | COMMUNITY
Start: 2021-09-20 | End: 1900-01-01

## 2023-02-14 RX ORDER — ENALAPRIL MALEATE 5 MG/1
5 TABLET ORAL
Qty: 90 | Refills: 1 | Status: ACTIVE | COMMUNITY
Start: 2017-07-14 | End: 1900-01-01

## 2023-02-16 ENCOUNTER — LABORATORY RESULT (OUTPATIENT)
Age: 88
End: 2023-02-16

## 2023-02-17 ENCOUNTER — LABORATORY RESULT (OUTPATIENT)
Age: 88
End: 2023-02-17

## 2023-02-18 NOTE — REVIEW OF SYSTEMS
[Headache] : no headache [Dizziness] : no dizziness [Fainting] : no fainting [Confusion] : no confusion [Memory Loss] : memory loss [Unsteady Walking] : ataxia [Negative] : Heme/Lymph [de-identified] : skin break left shoulder side

## 2023-02-18 NOTE — PHYSICAL EXAM
[Well Nourished] : well nourished [No Acute Distress] : no acute distress [Well Developed] : well developed [Well-Appearing] : well-appearing [Normal Sclera/Conjunctiva] : normal sclera/conjunctiva [PERRL] : pupils equal round and reactive to light [Normal Outer Ear/Nose] : the outer ears and nose were normal in appearance [Supple] : supple [No Respiratory Distress] : no respiratory distress  [Clear to Auscultation] : lungs were clear to auscultation bilaterally [Normal Rate] : normal rate  [Regular Rhythm] : with a regular rhythm [Normal S1, S2] : normal S1 and S2 [No Edema] : there was no peripheral edema [No Extremity Clubbing/Cyanosis] : no extremity clubbing/cyanosis [Soft] : abdomen soft [Non Tender] : non-tender [Non-distended] : non-distended [No Masses] : no abdominal mass palpated [No HSM] : no HSM [Normal Bowel Sounds] : normal bowel sounds [Normal Affect] : the affect was normal [Normal Mood] : the mood was normal [Comprehensive Foot Exam Normal] : Right and left foot were examined and both feet are normal. No ulcers in either foot. Toes are normal and with full ROM.  Normal tactile sensation with monofilament testing throughout both feet [de-identified] : sitting in wheelchair [de-identified] : unsteady gait

## 2023-02-18 NOTE — HEALTH RISK ASSESSMENT
[No] : In the past 12 months have you used drugs other than those required for medical reasons? No [No falls in past year] : Patient reported no falls in the past year [0] : 2) Feeling down, depressed, or hopeless: Not at all (0) [PHQ-2 Negative - No further assessment needed] : PHQ-2 Negative - No further assessment needed [de-identified] : sedentary, wheelchair bound [de-identified] : diabetic [[id number]] : 0 [With Patient/Caregiver] : , with patient/caregiver [AdvancecareDate] : 10/22 [Never] : Never

## 2023-02-18 NOTE — PLAN
[FreeTextEntry1] : stain for HLD. low chol. diet.\par ciontinue cyurrent management .\par  continue current management. \par pt. will  follow urology.

## 2023-02-18 NOTE — HISTORY OF PRESENT ILLNESS
[FreeTextEntry1] : see HPI [de-identified] : Here with daughter and aide. She Is sitting in wheelchair, comfortable, NAD. She is AAOX3, NAD. She has HLD and daughter is requesting refill for statin. Patient is due for labs.Patient eats eggs daily and is watching her carbs. She is requesting Flu vaccine. She is followed by cardiology and vascular.

## 2023-02-20 ENCOUNTER — LABORATORY RESULT (OUTPATIENT)
Age: 88
End: 2023-02-20

## 2023-02-23 ENCOUNTER — LABORATORY RESULT (OUTPATIENT)
Age: 88
End: 2023-02-23

## 2023-02-28 ENCOUNTER — NON-APPOINTMENT (OUTPATIENT)
Age: 88
End: 2023-02-28

## 2023-03-13 RX ORDER — METOPROLOL SUCCINATE 25 MG/1
25 TABLET, EXTENDED RELEASE ORAL
Qty: 90 | Refills: 1 | Status: ACTIVE | COMMUNITY
Start: 2018-05-29 | End: 1900-01-01

## 2023-03-13 RX ORDER — ATORVASTATIN CALCIUM 10 MG/1
10 TABLET, FILM COATED ORAL
Qty: 90 | Refills: 3 | Status: ACTIVE | COMMUNITY
Start: 1900-01-01 | End: 1900-01-01

## 2023-03-13 RX ORDER — FAMOTIDINE 20 MG/1
20 TABLET, FILM COATED ORAL
Qty: 90 | Refills: 3 | Status: ACTIVE | COMMUNITY
Start: 2021-12-02 | End: 1900-01-01

## 2023-03-13 RX ORDER — MUPIROCIN 20 MG/G
2 OINTMENT TOPICAL TWICE DAILY
Qty: 1 | Refills: 0 | Status: ACTIVE | COMMUNITY
Start: 2020-05-18 | End: 1900-01-01

## 2023-03-14 DIAGNOSIS — D64.9 ANEMIA, UNSPECIFIED: ICD-10-CM

## 2023-04-23 ENCOUNTER — EMERGENCY (EMERGENCY)
Facility: HOSPITAL | Age: 88
LOS: 1 days | Discharge: HOME | End: 2023-04-23
Attending: EMERGENCY MEDICINE
Payer: MEDICARE

## 2023-04-23 ENCOUNTER — TRANSCRIPTION ENCOUNTER (OUTPATIENT)
Age: 88
End: 2023-04-23

## 2023-04-23 VITALS
SYSTOLIC BLOOD PRESSURE: 154 MMHG | HEART RATE: 85 BPM | DIASTOLIC BLOOD PRESSURE: 83 MMHG | OXYGEN SATURATION: 99 % | RESPIRATION RATE: 17 BRPM | TEMPERATURE: 97 F

## 2023-04-23 VITALS
OXYGEN SATURATION: 95 % | DIASTOLIC BLOOD PRESSURE: 84 MMHG | HEART RATE: 65 BPM | TEMPERATURE: 98 F | SYSTOLIC BLOOD PRESSURE: 154 MMHG | RESPIRATION RATE: 16 BRPM

## 2023-04-23 DIAGNOSIS — Z98.89 OTHER SPECIFIED POSTPROCEDURAL STATES: Chronic | ICD-10-CM

## 2023-04-23 LAB
ANION GAP SERPL CALC-SCNC: 15 MMOL/L — SIGNIFICANT CHANGE UP (ref 5–17)
BASOPHILS # BLD AUTO: 0.03 K/UL — SIGNIFICANT CHANGE UP (ref 0–0.2)
BASOPHILS NFR BLD AUTO: 0.5 % — SIGNIFICANT CHANGE UP (ref 0–2)
BUN SERPL-MCNC: 41.5 MG/DL — HIGH (ref 8–20)
CALCIUM SERPL-MCNC: 9.2 MG/DL — SIGNIFICANT CHANGE UP (ref 8.4–10.5)
CHLORIDE SERPL-SCNC: 110 MMOL/L — HIGH (ref 96–108)
CO2 SERPL-SCNC: 14 MMOL/L — LOW (ref 22–29)
CREAT SERPL-MCNC: 0.82 MG/DL — SIGNIFICANT CHANGE UP (ref 0.5–1.3)
EGFR: 70 ML/MIN/1.73M2 — SIGNIFICANT CHANGE UP
EOSINOPHIL # BLD AUTO: 0.06 K/UL — SIGNIFICANT CHANGE UP (ref 0–0.5)
EOSINOPHIL NFR BLD AUTO: 1.1 % — SIGNIFICANT CHANGE UP (ref 0–6)
GLUCOSE SERPL-MCNC: 108 MG/DL — HIGH (ref 70–99)
HCT VFR BLD CALC: 34.9 % — SIGNIFICANT CHANGE UP (ref 34.5–45)
HGB BLD-MCNC: 11.1 G/DL — LOW (ref 11.5–15.5)
IMM GRANULOCYTES NFR BLD AUTO: 0.4 % — SIGNIFICANT CHANGE UP (ref 0–0.9)
LYMPHOCYTES # BLD AUTO: 1.93 K/UL — SIGNIFICANT CHANGE UP (ref 1–3.3)
LYMPHOCYTES # BLD AUTO: 33.9 % — SIGNIFICANT CHANGE UP (ref 13–44)
MCHC RBC-ENTMCNC: 31.2 PG — SIGNIFICANT CHANGE UP (ref 27–34)
MCHC RBC-ENTMCNC: 31.8 GM/DL — LOW (ref 32–36)
MCV RBC AUTO: 98 FL — SIGNIFICANT CHANGE UP (ref 80–100)
MONOCYTES # BLD AUTO: 0.53 K/UL — SIGNIFICANT CHANGE UP (ref 0–0.9)
MONOCYTES NFR BLD AUTO: 9.3 % — SIGNIFICANT CHANGE UP (ref 2–14)
NEUTROPHILS # BLD AUTO: 3.13 K/UL — SIGNIFICANT CHANGE UP (ref 1.8–7.4)
NEUTROPHILS NFR BLD AUTO: 54.8 % — SIGNIFICANT CHANGE UP (ref 43–77)
PLATELET # BLD AUTO: 287 K/UL — SIGNIFICANT CHANGE UP (ref 150–400)
POTASSIUM SERPL-MCNC: 4.9 MMOL/L — SIGNIFICANT CHANGE UP (ref 3.5–5.3)
POTASSIUM SERPL-SCNC: 4.9 MMOL/L — SIGNIFICANT CHANGE UP (ref 3.5–5.3)
RBC # BLD: 3.56 M/UL — LOW (ref 3.8–5.2)
RBC # FLD: 14.8 % — HIGH (ref 10.3–14.5)
SODIUM SERPL-SCNC: 139 MMOL/L — SIGNIFICANT CHANGE UP (ref 135–145)
WBC # BLD: 5.7 K/UL — SIGNIFICANT CHANGE UP (ref 3.8–10.5)
WBC # FLD AUTO: 5.7 K/UL — SIGNIFICANT CHANGE UP (ref 3.8–10.5)

## 2023-04-23 PROCEDURE — 36415 COLL VENOUS BLD VENIPUNCTURE: CPT

## 2023-04-23 PROCEDURE — 73590 X-RAY EXAM OF LOWER LEG: CPT

## 2023-04-23 PROCEDURE — 85025 COMPLETE CBC W/AUTO DIFF WBC: CPT

## 2023-04-23 PROCEDURE — 80048 BASIC METABOLIC PNL TOTAL CA: CPT

## 2023-04-23 PROCEDURE — 73590 X-RAY EXAM OF LOWER LEG: CPT | Mod: 26,LT

## 2023-04-23 PROCEDURE — 96374 THER/PROPH/DIAG INJ IV PUSH: CPT

## 2023-04-23 PROCEDURE — 99284 EMERGENCY DEPT VISIT MOD MDM: CPT | Mod: 25

## 2023-04-23 PROCEDURE — 96361 HYDRATE IV INFUSION ADD-ON: CPT

## 2023-04-23 PROCEDURE — 99284 EMERGENCY DEPT VISIT MOD MDM: CPT

## 2023-04-23 RX ORDER — SODIUM CHLORIDE 9 MG/ML
1000 INJECTION, SOLUTION INTRAVENOUS ONCE
Refills: 0 | Status: COMPLETED | OUTPATIENT
Start: 2023-04-23 | End: 2023-04-23

## 2023-04-23 RX ORDER — ACETAMINOPHEN 500 MG
650 TABLET ORAL ONCE
Refills: 0 | Status: COMPLETED | OUTPATIENT
Start: 2023-04-23 | End: 2023-04-23

## 2023-04-23 RX ADMIN — SODIUM CHLORIDE 1000 MILLILITER(S): 9 INJECTION, SOLUTION INTRAVENOUS at 14:43

## 2023-04-23 RX ADMIN — Medication 100 MILLIGRAM(S): at 12:59

## 2023-04-23 RX ADMIN — Medication 650 MILLIGRAM(S): at 13:00

## 2023-04-23 NOTE — ED PROVIDER NOTE - OBJECTIVE STATEMENT
87 year old female with PMh CVA with R sided hemiparesis, bed bound, DM presents with wound to the LLE. As per daughter, they noticed a blister on th eleg that had popped 4-5 days ago. The leg looked well but today noticed it was red and tender. No fevers, no AMS, no discharge.

## 2023-04-23 NOTE — ED ADULT NURSE NOTE - NSIMPLEMENTINTERV_GEN_ALL_ED
Implemented All Fall with Harm Risk Interventions:  [place] to call system. Call bell, personal items and telephone within reach. Instruct patient to call for assistance. Room bathroom lighting operational. Non-slip footwear when patient is off stretcher. Physically safe environment: no spills, clutter or unnecessary equipment. Stretcher in lowest position, wheels locked, appropriate side rails in place. Provide visual cue, wrist band, yellow gown, etc. Monitor gait and stability. Monitor for mental status changes and reorient to person, place, and time. Review medications for side effects contributing to fall risk. Reinforce activity limits and safety measures with patient and family. Provide visual clues: red socks. Implemented All Fall with Harm Risk Interventions:  [place] to call system. Call bell, personal items and telephone within reach. Instruct patient to call for assistance. Room bathroom lighting operational. Non-slip footwear when patient is off stretcher. Physically safe environment: no spills, clutter or unnecessary equipment. Stretcher in lowest position, wheels locked, appropriate side rails in place. Provide visual cue, wrist band, yellow gown, etc. Monitor gait and stability. Monitor for mental status changes and reorient to person, place, and time. Review medications for side effects contributing to fall risk. Reinforce activity limits and safety measures with patient and family. Provide visual clues: red socks. Implemented All Fall with Harm Risk Interventions:  [place] to call system. Call bell, personal items and telephone within reach. Instruct patient to call for assistance. Room bathroom lighting operational. Non-slip footwear when patient is off stretcher. Physically safe environment: no spills, clutter or unnecessary equipment. Stretcher in lowest position, wheels locked, appropriate side rails in place. Provide visual cue, wrist band, yellow gown, etc. Monitor gait and stability. Monitor for mental status changes and reorient to person, place, and time. Review medications for side effects contributing to fall risk. Reinforce activity limits and safety measures with patient and family. Provide visual clues: red socks.

## 2023-04-23 NOTE — ED ADULT NURSE NOTE - CHIEF COMPLAINT QUOTE
87F biba from home for wound to left lower extremity x one week that started as blister, broke, was healing well and today is red and warm.

## 2023-04-23 NOTE — ED PROVIDER NOTE - PATIENT PORTAL LINK FT
You can access the FollowMyHealth Patient Portal offered by [facility] by registering at the following website: [email or web address]. By joining [facility]’s FollowMyHealth portal, you will also be able to view your health information using other applications (apps) compatible with our system. You can access the FollowMyHealth Patient Portal offered by [facility] by registering at the following website: [email or web address]. By joining [email or web address]’s FollowMyHealth portal, you will also be able to view your health information using other applications (apps) compatible with our system. You can access the FollowMyHealth Patient Portal offered by [facility] by registering at the following website: [email or web address]. By joining [employer]’s FollowMyHealth portal, you will also be able to view your health information using other applications (apps) compatible with our system.

## 2023-04-23 NOTE — ED ADULT NURSE NOTE - OBJECTIVE STATEMENT
PT presents to ED from home for LLE wound. PT with hx DM, daughter states started as a blister, blister popped and redness noted to site.  Daughter denies fevers, No drainage noted from site.  IV placed labs drawn and pt medicated per orders.  PT resides at home with daughter.  Right sided hemiplegia s/p stroke in past.  Bed bound at home.

## 2023-04-23 NOTE — ED PROVIDER NOTE - SKIN, MLM
Skin normal color for race, warm, dry. No evidence of rash. 1 cm superficial ulcer with surrounding induration, erythema, ttp, no fluctuance, crepitus, or pain out of proporiton

## 2023-04-23 NOTE — ED ADULT TRIAGE NOTE - NS ED NURSE AMBULANCES
[facility]

## 2023-04-23 NOTE — ED ADULT TRIAGE NOTE - CHIEF COMPLAINT QUOTE
What is lung cancer screening? Lung cancer screening is a way in which doctors check the lungs for early signs of cancer in people who have no symptoms of lung cancer. A low-dose CT scan uses much less radiation than a normal CT scan and shows a more detailed image of the lungs than a standard X-ray. The goal of lung cancer screening is to find cancer early, before it has a chance to grow, spread, or cause problems. One large study found that smokers who were screened with low-dose CT scans were less likely to die of lung cancer than those who were screened with standard X-ray. Below is a summary of the things you need to know regarding screening for lung cancer with low-dose computed tomography (LDCT). This is a screening program that involves routine annual screening with LDCT studies of the lung. The LDCTs are done using low-dose radiation that is not thought to increase your cancer risk. If you have other serious medical conditions (other cancers, congestive heart failure) that limit your life expectancy to less than 10 years, you should not undergo lung cancer screening with LDCT. The chance is 20%-60% that the LDCT result will show abnormalities. This would require additional testing which could include repeat imaging or even invasive procedures. Most (about 95%) of \"abnormal\" LDCT results are false in the sense that no lung cancer is ultimately found. Additionally, some (about 10%) of the cancers found would not affect your life expectancy, even if undetected and untreated. If you are still smoking, the single most important thing that you can do to reduce your risk of dying of lung cancer is to quit. For this screening to be covered by Medicare and most other insurers, strict criteria must be met. If you do not meet these criteria, but still wish to undergo LDCT testing, you will be required to sign a waiver indicating your willingness to pay for the scan. 87F biba from home for wound to left lower extremity x one week that started as blister, broke, was healing well and today is red and warm.

## 2023-04-27 ENCOUNTER — APPOINTMENT (OUTPATIENT)
Age: 88
End: 2023-04-27

## 2023-04-28 ENCOUNTER — APPOINTMENT (OUTPATIENT)
Dept: FAMILY MEDICINE | Facility: CLINIC | Age: 88
End: 2023-04-28

## 2023-04-28 ENCOUNTER — APPOINTMENT (OUTPATIENT)
Age: 88
End: 2023-04-28

## 2023-05-04 ENCOUNTER — APPOINTMENT (OUTPATIENT)
Age: 88
End: 2023-05-04
Payer: MEDICARE

## 2023-05-04 DIAGNOSIS — D64.9 ANEMIA, UNSPECIFIED: ICD-10-CM

## 2023-05-04 DIAGNOSIS — F03.90 UNSPECIFIED DEMENTIA W/OUT BEHAVIORAL DISTURBANCE: ICD-10-CM

## 2023-05-04 DIAGNOSIS — L03.116 CELLULITIS OF LEFT LOWER LIMB: ICD-10-CM

## 2023-05-04 DIAGNOSIS — S81.802A UNSPECIFIED OPEN WOUND, LEFT LOWER LEG, INITIAL ENCOUNTER: ICD-10-CM

## 2023-05-04 PROCEDURE — 99214 OFFICE O/P EST MOD 30 MIN: CPT | Mod: 95

## 2023-05-04 RX ORDER — WHEY PROTEIN/ARGININE/C/E/ZINC 6G-4.5-25
LIQUID (ML) ORAL
Qty: 6 | Refills: 0 | Status: ACTIVE | COMMUNITY
Start: 2023-05-04 | End: 1900-01-01

## 2023-05-04 RX ORDER — METHENAMINE HIPPURATE 1 G/1
TABLET ORAL
Refills: 0 | Status: COMPLETED | COMMUNITY
End: 2023-05-04

## 2023-05-04 RX ORDER — SULFAMETHOXAZOLE AND TRIMETHOPRIM 800; 160 MG/1; MG/1
800-160 TABLET ORAL
Qty: 14 | Refills: 0 | Status: DISCONTINUED | COMMUNITY
Start: 2023-02-27 | End: 2023-05-04

## 2023-05-30 RX ORDER — AMOXICILLIN AND CLAVULANATE POTASSIUM 500; 125 MG/1; MG/1
500-125 TABLET, FILM COATED ORAL 3 TIMES DAILY
Qty: 21 | Refills: 0 | Status: COMPLETED | COMMUNITY
Start: 2023-05-04 | End: 2023-05-30

## 2023-06-01 ENCOUNTER — NON-APPOINTMENT (OUTPATIENT)
Age: 88
End: 2023-06-01

## 2023-06-13 ENCOUNTER — APPOINTMENT (OUTPATIENT)
Dept: FAMILY MEDICINE | Facility: CLINIC | Age: 88
End: 2023-06-13

## 2023-07-03 PROBLEM — D64.9 ANEMIA: Status: ACTIVE | Noted: 2023-03-14

## 2023-07-03 PROBLEM — S81.802A LEG WOUND, LEFT: Status: ACTIVE | Noted: 2023-04-25

## 2023-07-03 NOTE — REVIEW OF SYSTEMS
[Skin Rash] : skin rash [Headache] : no headache [Fainting] : no fainting [Dizziness] : no dizziness [Confusion] : no confusion [Memory Loss] : memory loss [Unsteady Walking] : ataxia [Negative] : Heme/Lymph

## 2023-07-03 NOTE — HISTORY OF PRESENT ILLNESS
[Home] : at home, [unfilled] , at the time of the visit. [Medical Office: ([facility])___] : at the medical office located in  [FreeTextEntry3] : daughter Wil [FreeTextEntry8] : patient seen to address non healing left leg wound with infection, wound is smaller but still open. patient is with aide. No chest pain, fever, sob, nausea. Patient has dementia.patient lives with her daughter.

## 2023-07-03 NOTE — PHYSICAL EXAM
[No Acute Distress] : no acute distress [Well-Appearing] : well-appearing [Normal Sclera/Conjunctiva] : normal sclera/conjunctiva [Normal Outer Ear/Nose] : the outer ears and nose were normal in appearance [Supple] : supple [No Respiratory Distress] : no respiratory distress  [de-identified] : non healing left  leg wound with yellow base and  erythema around it [de-identified] : dementia

## 2023-07-03 NOTE — HEALTH RISK ASSESSMENT
[No] : In the past 12 months have you used drugs other than those required for medical reasons? No [No falls in past year] : Patient reported no falls in the past year [0] : 2) Feeling down, depressed, or hopeless: Not at all (0) [PHQ-2 Negative - No further assessment needed] : PHQ-2 Negative - No further assessment needed [[id number]] : 0 [Never] : Never

## 2023-08-14 ENCOUNTER — APPOINTMENT (OUTPATIENT)
Dept: UROLOGY | Facility: CLINIC | Age: 88
End: 2023-08-14

## 2024-07-30 NOTE — PAST MEDICAL HISTORY
Yan Powell is a 74 y.o. female on day 11 of admission presenting with Cardiac arrest (Multi).      Subjective   Patient states she feels tired today. Was sitting in a chair and had difficulty getting up on her own, states her legs left weak. Tolerating PO. Had BM today. Patient's  and 2 sons present at bedside. Patient's  requesting to talk to Dr. Goldman regarding defibrillator.        Objective     Last Recorded Vitals  BP 94/68 (BP Location: Left arm)   Pulse 85   Temp 36.5 °C (97.7 °F) (Oral)   Resp 22   Wt 91.8 kg (202 lb 6.1 oz)   SpO2 97%   Intake/Output last 3 Shifts:    Intake/Output Summary (Last 24 hours) at 7/30/2024 1628  Last data filed at 7/30/2024 0932  Gross per 24 hour   Intake --   Output 300 ml   Net -300 ml       Admission Weight  Weight: 117 kg (258 lb 6.1 oz) (07/19/24 1251)    Daily Weight  07/30/24 : 91.8 kg (202 lb 6.1 oz)    Image Results  Transthoracic Echo (TTE) [place] [address]             Phone [phone]    TRANSTHORACIC ECHOCARDIOGRAM REPORT    Patient Name:      YAN POWELL         Reading Physician:    [id number] Juan Byrd MD  Study Date:        7/19/2024             Ordering Provider:    [id number] FLETCHER ABREU  MRN/PID:           [id number]              Fellow:  Accession#:        [id number]          Nurse:  Date of Birth/Age: 1949 / 74 years Sonographer:          Jennifer Childers RDCS  Gender:            F                     Additional Staff:  Height:            170.00 cm             Admit Date:  Weight:            117.00 kg             Admission Status:     Inpatient -                                                                 Routine  BSA / BMI:         2.25 m2 / 40.48 kg/m2  Department Location:  [facility] ICU  Blood Pressure: 125 /72 mmHg    Study Type:    TRANSTHORACIC ECHO (TTE) COMPLETE  Diagnosis/ICD: Cardiac arrest, cause unspecified-I46.9  Indication:    cardiac arrest  CPT Codes:     Echo Complete w Full [id number]    Patient History:  Pertinent History: Cardiac arrest vent hypotensive.    Study Detail: The following Echo studies were performed: 2D, M-Mode, Doppler and                color flow. Technically challenging study due to prominent lung                artifact, body habitus and patient lying in supine position.                Definity used as a contrast agent for endocardial border                definition. Total contrast used for this procedure was 2 mL via IV                push.       PHYSICIAN INTERPRETATION:  Left Ventricle: Left ventricular ejection fraction is mildly decreased, by visual estimate at 50%. There is global hypokinesis of the left ventricle with minor regional variations. The left ventricular cavity size is normal. Spectral Doppler shows a normal pattern of left ventricular diastolic filling.  Left Atrium: The left atrium is normal in size.  Right Ventricle: The right ventricle is normal in size. There is normal right ventricular global systolic function.  Right Atrium: The right atrium is normal in size.  Aortic Valve: The aortic valve appears structurally normal. The aortic valve dimensionless index is 0.42. There is mild aortic valve regurgitation. The peak instantaneous gradient of the aortic valve is 15.7 mmHg. The mean gradient of the aortic valve is 9.0 mmHg.  Mitral Valve: The mitral valve is normal in structure. There is no evidence of mitral valve regurgitation.  Tricuspid Valve: The tricuspid valve is structurally normal. There is trace tricuspid regurgitation. The Doppler estimated RVSP is mildly elevated at 33.0 mmHg.  Pulmonic Valve: The pulmonic valve is structurally normal. There is physiologic pulmonic valve  regurgitation.  Pericardium: There is no pericardial effusion noted.  Aorta: The aortic root is normal.  Systemic Veins: The inferior vena cava appears dilated. There is less than 50% IVC collapse with inspiration.       CONCLUSIONS:   1. Poorly visualized anatomical structures due to suboptimal image quality.   2. Left ventricular ejection fraction is mildly decreased, by visual estimate at 50%.   3. There is global hypokinesis of the left ventricle with minor regional variations.   4. There is normal right ventricular global systolic function.   5. No evidence of mitral valve regurgitation.   6. Mildly elevated RVSP.   7. Trace tricuspid regurgitation is visualized.   8. Mild aortic valve regurgitation.    QUANTITATIVE DATA SUMMARY:  2D MEASUREMENTS:                            Normal Ranges:  LAs:           2.80 cm    (2.7-4.0cm)  IVSd:          1.04 cm    (0.6-1.1cm)  LVPWd:         0.82 cm    (0.6-1.1cm)  LVIDd:         6.22 cm    (3.9-5.9cm)  LV Mass Index: 106.0 g/m2    LV SYSTOLIC FUNCTION BY 2D PLANIMETRY (MOD):                       Normal Ranges:  EF-A4C View:    50 % (>=55%)  EF-A2C View:    46 %  EF-Biplane:     50 %  EF-Visual:      50 %  LV EF Reported: 50 %    LV DIASTOLIC FUNCTION:                      Normal Ranges:  MV Peak E: 0.58 m/s (0.7-1.2 m/s)  MV Peak A: 0.82 m/s (0.42-0.7 m/s)  E/A Ratio: 0.71     (1.0-2.2)    MITRAL VALVE:                  Normal Ranges:  MV DT: 354 msec (150-240msec)    AORTIC VALVE:                                     Normal Ranges:  AoV Vmax:                1.98 m/s  (<=1.7m/s)  AoV Peak PG:             15.7 mmHg (<20mmHg)  AoV Mean P.0 mmHg  (1.7-11.5mmHg)  LVOT Max Dao:            0.78 m/s  (<=1.1m/s)  AoV VTI:                 39.70 cm  (18-25cm)  LVOT VTI:                16.50 cm  LVOT Diameter:           2.00 cm   (1.8-2.4cm)  AoV Area, VTI:           1.31 cm2  (2.5-5.5cm2)  AoV Area,Vmax:           1.23 cm2  (2.5-4.5cm2)  AoV Dimensionless Index:  0.42    TRICUSPID VALVE/RVSP:                              Normal Ranges:  Peak TR Velocity: 2.12 m/s  RV Syst Pressure: 33.0 mmHg (< 30mmHg)  IVC Diam:         2.27 cm    PULMONIC VALVE:                       Normal Ranges:  PV Max Dao: 0.7 m/s  (0.6-0.9m/s)  PV Max P.1 mmHg       [id number] Juan Byrd MD  Electronically signed on 2024 at 3:34:25 PM       ** Final **      Physical Exam  Constitutional:       General: She is not in acute distress.     Appearance: She is not toxic-appearing.   HENT:      Head: Normocephalic.      Mouth/Throat:      Pharynx: Oropharynx is clear.   Eyes:      General: No scleral icterus.  Cardiovascular:      Rate and Rhythm: Normal rate.   Pulmonary:      Effort: No respiratory distress.      Breath sounds: No wheezing.   Abdominal:      General: There is no distension.      Palpations: Abdomen is soft.   Musculoskeletal:      Right lower leg: No edema.      Left lower leg: No edema.   Neurological:      Mental Status: She is alert.   Psychiatric:         Behavior: Behavior normal.         Relevant Results               Assessment/Plan            Principal Problem:    Cardiac arrest (Multi)  Active Problems:    Cardiac arrest with ventricular fibrillation (Multi)    Vfib cardiac arrest   EP following  Cardiology following  S/p cardiac catheterization , no obstructing lesions, did not require PCI  Patient's  refusing ICD placement   Continue metoprolol and amiodarone     Acute metabolic encephalopathy  Evaluated by neurology  Psychiatry following  Suspect 2/2 anoxic brain injury  Remains A&Ox1 to self only  S/p EEG  showing mild diffuse encephalopathy  Continue zyprexa and melatonin nightly  Continue hydroxyzine PRN and olanzapine PRN for anxiety/agitation per psych     Leukocytosis  Completed a course of antibiotics in the ICU  Monitor off antibiotics for now in the absence of s/s of infection     Acute hypoxic and hypercapnic respiratory failure,  resolved  Stable on room air     Hypokalemia, resolved  Replace PRN     Debility  PT/OT- recommending moderate intensity rehab  Family agreeable to SNF     Discussed with Grace Russ that family is requesting to speak with Dr. Goldman - states she will talk to patient's family.     Plan:  Remains status quo  Discharge planning - family agreeable to SNF placement, awaiting facility acceptance and precert              Araseli Nash MD       [Postmenopausal] : postmenopausal

## 2025-03-04 NOTE — ED PROVIDER NOTE - CLINICAL SUMMARY MEDICAL DECISION MAKING FREE TEXT BOX
Pt received seated at bedside chair Pt is at baseline as per family, no signs of sepsis or severe infection of the leg, suitable for outpt po mgt. Pt with dry mucus membranes, pre-renal BUN/Cr ratio, this likely explains the decreased serum bicarb. Pt hydrated here and tolerating PO and family agreeable and comfortable with taking the pt home. Stable for dc